# Patient Record
Sex: FEMALE | Race: WHITE | NOT HISPANIC OR LATINO | Employment: OTHER | URBAN - METROPOLITAN AREA
[De-identification: names, ages, dates, MRNs, and addresses within clinical notes are randomized per-mention and may not be internally consistent; named-entity substitution may affect disease eponyms.]

---

## 2017-01-12 ENCOUNTER — CLINICAL SUPPORT (OUTPATIENT)
Dept: CARDIOLOGY | Facility: CLINIC | Age: 82
End: 2017-01-12
Payer: MEDICARE

## 2017-01-12 DIAGNOSIS — Z98.61 HISTORY OF PTCA: ICD-10-CM

## 2017-01-12 DIAGNOSIS — I25.10 CAD IN NATIVE ARTERY: ICD-10-CM

## 2017-01-12 DIAGNOSIS — I10 ESSENTIAL HYPERTENSION: ICD-10-CM

## 2017-01-12 DIAGNOSIS — I20.9 AP (ANGINA PECTORIS): ICD-10-CM

## 2017-01-12 DIAGNOSIS — I77.9 CAROTID ARTERY DISEASE WITHOUT CEREBRAL INFARCTION: ICD-10-CM

## 2017-01-12 LAB — INTERNAL CAROTID STENOSIS: ABNORMAL

## 2017-01-12 PROCEDURE — 93880 EXTRACRANIAL BILAT STUDY: CPT | Mod: S$GLB,,, | Performed by: INTERNAL MEDICINE

## 2017-01-12 PROCEDURE — 93306 TTE W/DOPPLER COMPLETE: CPT | Mod: S$GLB,,, | Performed by: INTERNAL MEDICINE

## 2017-01-13 LAB
DIASTOLIC DYSFUNCTION: YES
ESTIMATED PA SYSTOLIC PRESSURE: 34.34
RETIRED EF AND QEF - SEE NOTES: 60 (ref 55–65)
TRICUSPID VALVE REGURGITATION: ABNORMAL

## 2017-01-16 ENCOUNTER — TELEPHONE (OUTPATIENT)
Dept: RADIOLOGY | Facility: HOSPITAL | Age: 82
End: 2017-01-16

## 2017-01-17 ENCOUNTER — OFFICE VISIT (OUTPATIENT)
Dept: CARDIOLOGY | Facility: CLINIC | Age: 82
End: 2017-01-17
Payer: MEDICARE

## 2017-01-17 ENCOUNTER — HOSPITAL ENCOUNTER (OUTPATIENT)
Dept: RADIOLOGY | Facility: HOSPITAL | Age: 82
Discharge: HOME | End: 2017-01-17
Attending: FAMILY MEDICINE
Payer: MEDICARE

## 2017-01-17 VITALS
SYSTOLIC BLOOD PRESSURE: 128 MMHG | HEART RATE: 76 BPM | BODY MASS INDEX: 37.26 KG/M2 | DIASTOLIC BLOOD PRESSURE: 84 MMHG | WEIGHT: 218.25 LBS | HEIGHT: 64 IN

## 2017-01-17 DIAGNOSIS — I20.9 AP (ANGINA PECTORIS): ICD-10-CM

## 2017-01-17 DIAGNOSIS — I51.89 DIASTOLIC DYSFUNCTION: ICD-10-CM

## 2017-01-17 DIAGNOSIS — I25.10 ATHEROSCLEROSIS OF NATIVE CORONARY ARTERY OF NATIVE HEART WITHOUT ANGINA PECTORIS: ICD-10-CM

## 2017-01-17 DIAGNOSIS — I25.10 CAD IN NATIVE ARTERY: Primary | ICD-10-CM

## 2017-01-17 DIAGNOSIS — I77.9 CAROTID ARTERY DISEASE WITHOUT CEREBRAL INFARCTION: ICD-10-CM

## 2017-01-17 DIAGNOSIS — Z98.61 HISTORY OF PTCA: ICD-10-CM

## 2017-01-17 DIAGNOSIS — R92.8 FOLLOW-UP EXAMINATION OF ABNORMAL MAMMOGRAM: ICD-10-CM

## 2017-01-17 DIAGNOSIS — I10 ESSENTIAL HYPERTENSION: ICD-10-CM

## 2017-01-17 DIAGNOSIS — E11.9 CONTROLLED TYPE 2 DIABETES MELLITUS WITHOUT COMPLICATION, WITHOUT LONG-TERM CURRENT USE OF INSULIN: ICD-10-CM

## 2017-01-17 DIAGNOSIS — E78.2 MIXED HYPERLIPIDEMIA: ICD-10-CM

## 2017-01-17 DIAGNOSIS — J44.9 CHRONIC OBSTRUCTIVE PULMONARY DISEASE, UNSPECIFIED COPD TYPE: ICD-10-CM

## 2017-01-17 PROCEDURE — 3074F SYST BP LT 130 MM HG: CPT | Mod: S$GLB,,, | Performed by: INTERNAL MEDICINE

## 2017-01-17 PROCEDURE — 1160F RVW MEDS BY RX/DR IN RCRD: CPT | Mod: S$GLB,,, | Performed by: INTERNAL MEDICINE

## 2017-01-17 PROCEDURE — 99214 OFFICE O/P EST MOD 30 MIN: CPT | Mod: S$GLB,,, | Performed by: INTERNAL MEDICINE

## 2017-01-17 PROCEDURE — 3079F DIAST BP 80-89 MM HG: CPT | Mod: S$GLB,,, | Performed by: INTERNAL MEDICINE

## 2017-01-17 PROCEDURE — 77065 DX MAMMO INCL CAD UNI: CPT | Mod: 26,LT,, | Performed by: RADIOLOGY

## 2017-01-17 PROCEDURE — 1157F ADVNC CARE PLAN IN RCRD: CPT | Mod: S$GLB,,, | Performed by: INTERNAL MEDICINE

## 2017-01-17 PROCEDURE — 1159F MED LIST DOCD IN RCRD: CPT | Mod: S$GLB,,, | Performed by: INTERNAL MEDICINE

## 2017-01-17 PROCEDURE — 77061 BREAST TOMOSYNTHESIS UNI: CPT | Mod: 26,LT,, | Performed by: RADIOLOGY

## 2017-01-17 PROCEDURE — 76642 ULTRASOUND BREAST LIMITED: CPT | Mod: 26,LT,, | Performed by: RADIOLOGY

## 2017-01-17 PROCEDURE — 99999 PR PBB SHADOW E&M-EST. PATIENT-LVL III: CPT | Mod: PBBFAC,,, | Performed by: INTERNAL MEDICINE

## 2017-01-17 RX ORDER — ISOSORBIDE MONONITRATE 60 MG/1
60 TABLET, EXTENDED RELEASE ORAL 2 TIMES DAILY
Qty: 180 TABLET | Refills: 3 | Status: SHIPPED | OUTPATIENT
Start: 2017-01-17 | End: 2018-03-14 | Stop reason: SDUPTHER

## 2017-01-17 NOTE — PROGRESS NOTES
Subjective:    Patient ID:  Maldonado Deleon is a 81 y.o. female who presents for evaluation of Coronary Artery Disease; Carotid Artery Disease; Hyperlipidemia; and Hypertension      HPI Mrs. Deleon returns for f/u of CAD.   Her current medical conditions include COPD, carotid artery disease, hypertension, hyperlipidemia, diabetes (former smoker) and CAD. Nonsmoker.  s/p [facility] 6/15 for anginal sxs and had PCI of calcified 90% mid-RCA stenosis with Diamondback atherectomy and LINDSAY x one. Her mid-LAD stenosis was overal stable in 50 - 60% range and medical mgt advised.  She is here for f/u.   She is dealing with hip pain issues, both sides, and sciatica.  Has seen multiple doctors.  CAD is stable.  She states she has taken one sl ntg since I last saw her.  She thinks it was stress related.  She has chronic COTTER.  This is stable.  No pnd/orthopnea.  Uses home O2 prn.  HTN well controlled on current medical tx, no associated sxs.  No tia/cva sxs.  Carotid us is stable with no progression of disease.  Echo shows normal LVEF, DD.  Lipids controlled on statin tx.    Patient Active Problem List   Diagnosis    Colon cancer    Anemia due to chronic blood loss    Iron deficiency anemia, unspecified    Diaphragmatic hernia without obstruction and without gangrene    Chronic obstructive pulmonary disease    Abnormal CXR (chest x-ray)    CAD in native artery    History of PTCA    Carotid artery disease without cerebral infarction    Hypertension    Mixed hyperlipidemia    Neck pain    Lung nodule - Hamartoma    Sinus congestion    Chronic pain syndrome    Intractable episodic headache    Left knee pain    Left leg swelling    Hematoma    Anxiety    AP (angina pectoris)    Atherosclerosis of native coronary artery without angina pectoris    Diabetes type 2, controlled    Controlled type 2 diabetes mellitus without complication, without long-term current use of insulin    Left hip pain    Diastolic dysfunction  "    Past Medical History   Diagnosis Date    Atypical chest pain 8/26/2013    CAD (coronary artery disease)     CAD in native artery 10/5/2015    Carotid artery disease without cerebral infarction 8/26/2013    Colon cancer      resection and chemo.    COPD (chronic obstructive pulmonary disease)     Depression     Diabetes mellitus type II     Diaphragmatic hernia without obstruction and without gangrene 8/13/2015    Emphysema of lung     Encounter for blood transfusion     Gallstone pancreatitis     History of PTCA 10/5/2015    Hypertension     Lymphocytic colitis     Meningioma     OA (osteoarthritis)     Obesity 8/26/2013       Review of Systems   Constitution: Negative.   HENT: Negative.    Eyes: Negative.    Cardiovascular: Positive for dyspnea on exertion.   Respiratory: Positive for shortness of breath.    Endocrine: Negative.    Hematologic/Lymphatic: Negative.    Skin: Negative.    Musculoskeletal: Positive for arthritis and joint pain.   Gastrointestinal: Negative.    Genitourinary: Negative.    Neurological: Negative.    Psychiatric/Behavioral: Negative.    Allergic/Immunologic: Negative.        Visit Vitals    /84 (BP Location: Left arm, Patient Position: Sitting)    Pulse 76    Ht 5' 4" (1.626 m)    Wt 99 kg (218 lb 4.1 oz)    BMI 37.46 kg/m2       Wt Readings from Last 3 Encounters:   01/17/17 99 kg (218 lb 4.1 oz)   12/08/16 99.8 kg (220 lb)   12/01/16 100.6 kg (221 lb 12.5 oz)     Temp Readings from Last 3 Encounters:   12/01/16 97 °F (36.1 °C) (Tympanic)   11/12/16 97.7 °F (36.5 °C) (Tympanic)   09/28/16 97.2 °F (36.2 °C) (Tympanic)     BP Readings from Last 3 Encounters:   01/17/17 128/84   12/08/16 124/80   12/01/16 124/80     Pulse Readings from Last 3 Encounters:   01/17/17 76   12/08/16 76   12/01/16 80          Objective:    Physical Exam   Constitutional: She is oriented to person, place, and time. Vital signs are normal. She appears well-developed and " well-nourished. She is active and cooperative. She does not have a sickly appearance. She does not appear ill. No distress.   HENT:   Head: Normocephalic.   Neck: Neck supple. Normal carotid pulses, no hepatojugular reflux and no JVD present. Carotid bruit is not present. No thyromegaly present.   Cardiovascular: Normal rate, regular rhythm, S1 normal, S2 normal and normal pulses.  PMI is not displaced.  Exam reveals no gallop and no friction rub.    Murmur heard.   Medium-pitched midsystolic murmur is present with a grade of 1/6  at the upper left sternal border  Pulses:       Radial pulses are 2+ on the right side, and 2+ on the left side.   Pulmonary/Chest: Effort normal and breath sounds normal. She has no wheezes. She has no rales.   Abdominal: Soft. Normal appearance, normal aorta and bowel sounds are normal. There is no tenderness.   Musculoskeletal: She exhibits no edema.   Lymphadenopathy:     She has no cervical adenopathy.   Neurological: She is alert and oriented to person, place, and time.   Skin: Skin is warm. She is not diaphoretic.   Psychiatric: She has a normal mood and affect. Her behavior is normal.   Nursing note and vitals reviewed.      I have reviewed all pertinent labs and cardiac studies.        Chemistry        Component Value Date/Time     07/19/2016 0836    K 4.0 07/19/2016 0836     07/19/2016 0836    CO2 31 (H) 07/19/2016 0836    BUN 16 07/19/2016 0836    CREATININE 0.8 07/19/2016 0836     07/19/2016 0836        Component Value Date/Time    CALCIUM 9.4 07/19/2016 0836    ALKPHOS 75 07/19/2016 0836    AST 17 07/19/2016 0836    ALT 14 07/19/2016 0836    BILITOT 0.3 07/19/2016 0836        Lab Results   Component Value Date    WBC 7.14 08/17/2016    HGB 13.3 08/17/2016    HCT 41.4 08/17/2016    MCV 91 08/17/2016     08/17/2016     Lab Results   Component Value Date    HGBA1C 5.8 12/01/2016     Lab Results   Component Value Date    CHOL 168 07/19/2016    CHOL 159  04/12/2016    CHOL 151 06/17/2014     Lab Results   Component Value Date    HDL 54 07/19/2016    HDL 58 04/12/2016    HDL 61 06/17/2014     Lab Results   Component Value Date    LDLCALC 92.0 07/19/2016    LDLCALC 75.8 04/12/2016    LDLCALC 69.4 06/17/2014     Lab Results   Component Value Date    TRIG 110 07/19/2016    TRIG 126 04/12/2016    TRIG 103 06/17/2014     Lab Results   Component Value Date    CHOLHDL 32.1 07/19/2016    CHOLHDL 36.5 04/12/2016    CHOLHDL 40.4 06/17/2014       TEST DESCRIPTION     RIGHT  The right Mid Common Carotid Artery is visualized.   The right carotid bulb artery is visualized.   The right Distal Internal Carotid Artery has 20 - 39% stenosis.   The right external carotid artery is visualized.   The right vertebral artery is visualized, associated with anterograde flow.   The right ICA/CCA ratio is: .93    LEFT  The left Mid Common Carotid Artery is visualized.   The left carotid bulb artery is visualized, associated with heterogeneous plaq.   The left Distal Internal Carotid Artery has 50 - 59% stenosis, associated with tortuosity.   There is acceleration in the left external carotid artery, associated with heterogeneous plaq.   The left vertebral artery is visualized, associated with anterograde flow.   The left ICA/CCA ratio is: 2.51      CONCLUSIONS   There is 20 - 39% right Internal Carotid stenosis.  There is 50 - 59% left Internal Carotid stenosis.          This document has been electronically    SIGNED BY: Theresa Reyes MD On: 01/12/2017 17:03          TEST DESCRIPTION       Aorta: The aortic root is normal in size, measuring 2.4 cm at sinotubular junction and 3.1 cm at Sinuses of Valsalva. The proximal ascending aorta is normal in size, measuring 2.4 cm across.     Left Atrium: The left atrial volume index is moderately enlarged, measuring 43.80 cc/m2.     Left Ventricle: The left ventricle is normal in size, with an end-diastolic diameter of 4.0 cm, and an end-systolic  diameter of 2.7 cm. LV wall thickness is normal, with the septum measuring 1.6 cm and the posterior wall measuring 1.4 cm across. Relative   wall thickness was increased at 0.70, and the LV mass index was increased at 135.6 g/m2 consistent with concentric left ventricular hypertrophy. Global left ventricular systolic function appears normal. Visually estimated ejection fraction is 60-65%.   The LV Doppler derived stroke volume equals 94.0 ccs.   The E/e'(lat) is 9.  This along with the following abnormalities (GOPI = 43.80 and LVMI = 135.60) suggests significant diastolic dysfunction.     Right Atrium: The right atrium is normal in size, measuring 5.4 cm in length and 3.4 cm in width in the apical view.     Right Ventricle: The right ventricle is normal in size measuring 3.5 cm at the base in the apical right ventricle-focused view. Global right ventricular systolic function appears normal. Tricuspid annular plane systolic excursion (TAPSE) is 2.5 cm. The   estimated PA systolic pressure is greater than 34 mmHg.     Aortic Valve:  The aortic valve is mildly sclerotic.     Mitral Valve:  Mitral valve is normal in structure with normal leaflet mobility.     Tricuspid Valve:  There is trivial to mild tricuspid regurgitation. Tricuspid valve is normal in structure with normal leaflet mobility.     Pulmonary Valve:  Pulmonary valve is normal in structure with normal leaflet mobility.     Intracavitary: There is no evidence of pericardial effusion, intracavity mass, thrombi, or vegetation.         CONCLUSIONS     1 - Moderate left atrial enlargement.     2 - Concentric hypertrophy.     3 - Normal left ventricular systolic function (EF 60-65%).     4 - Left ventricular diastolic dysfunction.     5 - Normal right ventricular systolic function .     6 - The estimated PA systolic pressure is greater than 34 mmHg.     7 - Trivial to mild tricuspid regurgitation.             This document has been electronically    SIGNED BY:  Hayes Chavez MD On: 01/13/2017 08:49        Assessment:       1. CAD in native artery    2. AP (angina pectoris)    3. History of PTCA    4. Carotid artery disease without cerebral infarction    5. Essential hypertension    6. Mixed hyperlipidemia    7. Atherosclerosis of native coronary artery of native heart without angina pectoris    8. Controlled type 2 diabetes mellitus without complication, without long-term current use of insulin    9. Chronic obstructive pulmonary disease, unspecified COPD type    10. Diastolic dysfunction         Plan:             STABLE CV CONDITIONS.  CHRONIC STABLE ANGINA/CAD.  CONTINUE OPTIMAL MEDICAL TX FOR CAD.  MAY CONTINUE TO USE SL NTG FOR ANGINA.   F/U CAROTID US ONE YEAR.  ALL TEST RESULTS REVIEWED IN DETAIL WITH PT.  CONTINUE F/U WITH PULMONARY FOR COPD.  F/U WITH ORTHO ON HIP DISCOMFORT/PAIN.  NO CHANGES TO MEDS TODAY.  F/U 6 MONTHS WITH LIPIDS/LABS.

## 2017-01-17 NOTE — MR AVS SNAPSHOT
[facility] - Cardiology  [address] [facility] [address]  [place] [address]  Phone: [phone]  Fax: [phone]                  Maldonado Deleon   2017 11:40 AM   Office Visit    Description:  Female : 1935   Provider:  Kurt Taylor MD   Department:  [facility] - Cardiology           Reason for Visit     Coronary Artery Disease     Carotid Artery Disease     Hyperlipidemia     Hypertension           Diagnoses this Visit        Comments    CAD in native artery    -  Primary     AP (angina pectoris)     will continue with the isosorbide    History of PTCA         Carotid artery disease without cerebral infarction         Essential hypertension         Mixed hyperlipidemia         Atherosclerosis of native coronary artery of native heart without angina pectoris         Controlled type 2 diabetes mellitus without complication, without long-term current use of insulin         Chronic obstructive pulmonary disease, unspecified COPD type         Diastolic dysfunction                To Do List           Future Appointments        Provider Department Dept Phone    2017 11:00 AM Christopher Gutierrez Sr., MD [facility] - Orthopedics [phone]    2017 11:15 AM [facility] [phone]    2017 11:40 AM Dale Patel MD [facility] Pulmonary Services [phone]      Goals (5 Years of Data)     None      Follow-Up and Disposition     Return in about 6 months (around 2017).       These Medications        Disp Refills Start End    isosorbide mononitrate (IMDUR) 60 MG 24 hr tablet 180 tablet 3 2017    Take 1 tablet (60 mg total) by mouth 2 (two) times daily. - Oral    Pharmacy: [facility] [address] [place], [place] [address], [place] - [address] [facility] Ph #: [phone]         [facility]     [facility] -  Assistance  Registered nurses in the [facility] provide clinical advisement, health education, appointment booking, and other  advisory services.  Call for this free service at [phone].             Medications           Message regarding Medications     Verify the changes and/or additions to your medication regime listed below are the same as discussed with your clinician today.  If any of these changes or additions are incorrect, please notify your healthcare provider.        STOP taking these medications     cetirizine (ZYRTEC) 10 MG tablet Take 1 tablet (10 mg total) by mouth once daily.    spironolactone (ALDACTONE) 25 MG tablet Take 1 tablet (25 mg total) by mouth once daily.           Verify that the below list of medications is an accurate representation of the medications you are currently taking.  If none reported, the list may be blank. If incorrect, please contact your healthcare provider. Carry this list with you in case of emergency.           Current Medications     albuterol-ipratropium 2.5mg-0.5mg/3mL (DUO-NEB) 0.5 mg-3 mg(2.5 mg base)/3 mL nebulizer solution Take 3 mLs by nebulization every 6 (six) hours.    amlodipine (NORVASC) 5 MG tablet TAKE 1 TABLET TWICE DAILY    aspirin (ECOTRIN) 81 MG EC tablet Take 81 mg by mouth once daily.    atenolol (TENORMIN) 25 MG tablet Take 1 tablet (25 mg total) by mouth once daily.    benazepril (LOTENSIN) 20 MG tablet Take 1 tablet (20 mg total) by mouth 2 (two) times daily.    budesonide (PULMICORT) 0.5 mg/2 mL nebulizer solution Take 2 mLs (0.5 mg total) by nebulization 2 (two) times daily. Wash out mouth after using.    clopidogrel (PLAVIX) 75 mg tablet Take 1 tablet (75 mg total) by mouth once daily.    fluticasone (FLONASE) 50 mcg/actuation nasal spray 2 sprays by Each Nare route once daily.    gabapentin (NEURONTIN) 300 MG capsule Take 1 capsule (300 mg total) by mouth 3 (three) times daily.    hydrocodone-acetaminophen 10-325mg (NORCO)  mg Tab 1 tablet in the morning and 1 at night, 1/2 tablet every 4 hours.    isosorbide mononitrate (IMDUR) 60 MG 24 hr tablet Take 1  "tablet (60 mg total) by mouth 2 (two) times daily.    metformin (GLUCOPHAGE) 500 MG tablet Take 1 tablet (500 mg total) by mouth 2 (two) times daily with meals.    nebulizer accessories Kit Use with nebulizer    nitroGLYCERIN (NITROSTAT) 0.4 MG SL tablet Place 1 tablet (0.4 mg total) under the tongue every 5 (five) minutes as needed for Chest pain.    pravastatin (PRAVACHOL) 20 MG tablet Take 1 tablet (20 mg total) by mouth once daily.    sertraline (ZOLOFT) 100 MG tablet Take 1 tablet (100 mg total) by mouth every evening.           Clinical Reference Information           Vital Signs - Last Recorded  Most recent update: 1/17/2017 11:28 AM by Ana Thompson    BP Pulse Ht Wt BMI    128/84 (BP Location: Left arm, Patient Position: Sitting) 76 5' 4" (1.626 m) 99 kg (218 lb 4.1 oz) 37.46 kg/m2      Blood Pressure          Most Recent Value    Right Arm BP - Sitting  144/80    Left Arm BP - Sitting  128/84    BP  128/84      Allergies as of 1/17/2017     No Known Allergies      Immunizations Administered on Date of Encounter - 1/17/2017     None      Orders Placed During Today's Visit     Future Labs/Procedures Expected by Expires    CBC auto differential  7/17/2017 3/18/2018    Comprehensive metabolic panel  7/17/2017 (Approximate) 3/18/2018    Lipid panel  7/17/2017 3/18/2018      "

## 2017-01-18 ENCOUNTER — DOCUMENTATION ONLY (OUTPATIENT)
Dept: RADIOLOGY | Facility: HOSPITAL | Age: 82
End: 2017-01-18

## 2017-01-18 NOTE — PROGRESS NOTES
Breast Care Management Follow-Up:    Date of Mammogram:01/17/17    Mammogram Reason:Follow-up at short-interval from prior study    Mammogram Results:and Left U/S - density seen in the left breast on prior study does not persist.      Referrals/Recommendations:Annual mammo in July 2017.        Patient Status:01/18/17 Results letter and report mailed to pt.

## 2017-01-19 NOTE — TELEPHONE ENCOUNTER
----- Message from Kenrick Paula sent at 1/19/2017 12:10 PM CST -----  Pt is requesting a call from nurse to discuss a prescription renewal for pain.          Please call pt back at [phone]

## 2017-01-20 NOTE — TELEPHONE ENCOUNTER
----- Message from Justine Wu sent at 1/20/2017  1:27 PM CST -----  Contact: Patient  Patient is returning a call to Nayeli, please call her back at [phone]. Thank you

## 2017-01-21 RX ORDER — HYDROCODONE BITARTRATE AND ACETAMINOPHEN 10; 325 MG/1; MG/1
TABLET ORAL
Qty: 90 TABLET | Refills: 0 | Status: SHIPPED | OUTPATIENT
Start: 2017-01-21 | End: 2017-01-24

## 2017-01-23 DIAGNOSIS — M25.552 LEFT HIP PAIN: Primary | ICD-10-CM

## 2017-01-24 ENCOUNTER — OFFICE VISIT (OUTPATIENT)
Dept: ORTHOPEDICS | Facility: CLINIC | Age: 82
End: 2017-01-24
Payer: MEDICARE

## 2017-01-24 ENCOUNTER — HOSPITAL ENCOUNTER (OUTPATIENT)
Dept: RADIOLOGY | Facility: HOSPITAL | Age: 82
Discharge: HOME | End: 2017-01-24
Attending: ORTHOPAEDIC SURGERY
Payer: MEDICARE

## 2017-01-24 VITALS
WEIGHT: 219.56 LBS | BODY MASS INDEX: 37.48 KG/M2 | SYSTOLIC BLOOD PRESSURE: 164 MMHG | DIASTOLIC BLOOD PRESSURE: 80 MMHG | HEART RATE: 72 BPM | HEIGHT: 64 IN

## 2017-01-24 DIAGNOSIS — M25.552 LEFT HIP PAIN: ICD-10-CM

## 2017-01-24 DIAGNOSIS — M16.12 ARTHRITIS OF LEFT HIP: Primary | ICD-10-CM

## 2017-01-24 PROCEDURE — 3077F SYST BP >= 140 MM HG: CPT | Mod: S$GLB,,, | Performed by: ORTHOPAEDIC SURGERY

## 2017-01-24 PROCEDURE — 99204 OFFICE O/P NEW MOD 45 MIN: CPT | Mod: S$GLB,,, | Performed by: ORTHOPAEDIC SURGERY

## 2017-01-24 PROCEDURE — 1157F ADVNC CARE PLAN IN RCRD: CPT | Mod: S$GLB,,, | Performed by: ORTHOPAEDIC SURGERY

## 2017-01-24 PROCEDURE — 1125F AMNT PAIN NOTED PAIN PRSNT: CPT | Mod: S$GLB,,, | Performed by: ORTHOPAEDIC SURGERY

## 2017-01-24 PROCEDURE — 73502 X-RAY EXAM HIP UNI 2-3 VIEWS: CPT | Mod: 26,LT,, | Performed by: RADIOLOGY

## 2017-01-24 PROCEDURE — 3079F DIAST BP 80-89 MM HG: CPT | Mod: S$GLB,,, | Performed by: ORTHOPAEDIC SURGERY

## 2017-01-24 PROCEDURE — 1159F MED LIST DOCD IN RCRD: CPT | Mod: S$GLB,,, | Performed by: ORTHOPAEDIC SURGERY

## 2017-01-24 PROCEDURE — 99999 PR PBB SHADOW E&M-EST. PATIENT-LVL III: CPT | Mod: PBBFAC,,, | Performed by: ORTHOPAEDIC SURGERY

## 2017-01-24 PROCEDURE — 1160F RVW MEDS BY RX/DR IN RCRD: CPT | Mod: S$GLB,,, | Performed by: ORTHOPAEDIC SURGERY

## 2017-01-24 RX ORDER — OXYCODONE AND ACETAMINOPHEN 10; 325 MG/1; MG/1
1 TABLET ORAL EVERY 4 HOURS PRN
Qty: 90 TABLET | Refills: 0 | Status: SHIPPED | OUTPATIENT
Start: 2017-01-24 | End: 2017-03-06 | Stop reason: SDUPTHER

## 2017-01-24 NOTE — PATIENT INSTRUCTIONS
What is Arthritis?  Arthritis is a disease that affects the joints (the parts where bones meet and move). It can affect any joint in your body. There are many types of arthritis, including osteoarthritis and rheumatoid arthrtitis. If your symptoms are mild, medications may be enough to reduce pain and swelling. For more severe arthritis, surgery may be needed to improve the condition of the joint or replace the joint entirely.                  What causes arthritis?  Cartilage is a smooth substance that protects the ends of your bones and provides cushioning. When you have arthritis, this cartilage breaks down and can no longer protect your bones. The bones rub against each other, causing pain and swelling. Over time, bone spurs (small pieces of rough or splintered bone) may develop, and the joint's range of motion can become limited.  Symptoms  Some of the more common symptoms of arthritis include:  · Joint pain and stiffness. Pain and stiffness get worse with long periods of rest or using a joint too long or too hard.  · Joints that have lost normal shape and motion.  · Tender, inflamed joints. They may look red and feel warm.  · Grinding or popping noise with joint movement.   · Feeling tired all the time.  Reducing symptoms  Following a healthy lifestyle by losing weight and exercising can help reduce symptoms of osteoarthritis. Medicines can be very helpful for arthritis.     © 1866-5198 [employer]. [address] [place] [address], [place] [address]. All rights reserved. This information is not intended as a substitute for professional medical care. Always follow your healthcare professional's instructions.        What is Arthritis?  Arthritis is a disease that affects the joints (the parts where bones meet and move). It can affect any joint in your body. There are many types of arthritis, including osteoarthritis and rheumatoid arthrtitis. If your symptoms are mild, medications may be enough to reduce  pain and swelling. For more severe arthritis, surgery may be needed to improve the condition of the joint or replace the joint entirely.                  What causes arthritis?  Cartilage is a smooth substance that protects the ends of your bones and provides cushioning. When you have arthritis, this cartilage breaks down and can no longer protect your bones. The bones rub against each other, causing pain and swelling. Over time, bone spurs (small pieces of rough or splintered bone) may develop, and the joint's range of motion can become limited.  Symptoms  Some of the more common symptoms of arthritis include:  · Joint pain and stiffness. Pain and stiffness get worse with long periods of rest or using a joint too long or too hard.  · Joints that have lost normal shape and motion.  · Tender, inflamed joints. They may look red and feel warm.  · Grinding or popping noise with joint movement.   · Feeling tired all the time.  Reducing symptoms  Following a healthy lifestyle by losing weight and exercising can help reduce symptoms of osteoarthritis. Medicines can be very helpful for arthritis.     © 1056-3298 [employer]. [address] [place] [address], [place] [address]. All rights reserved. This information is not intended as a substitute for professional medical care. Always follow your healthcare professional's instructions.

## 2017-01-24 NOTE — PROGRESS NOTES
CC:This is a 81-year-old female that complains of severe Left hip pain.    HPI:The patient has had a fracture of the left hip in the past.  She underwent an ORIF of that hip.  The patient states that the pain is rated a 10 out of 10.    PMH:    Past Medical History   Diagnosis Date    Atypical chest pain 8/26/2013    CAD (coronary artery disease)     CAD in native artery 10/5/2015    Carotid artery disease without cerebral infarction 8/26/2013    Colon cancer      resection and chemo.    COPD (chronic obstructive pulmonary disease)     Depression     Diabetes mellitus type II     Diaphragmatic hernia without obstruction and without gangrene 8/13/2015    Emphysema of lung     Encounter for blood transfusion     Gallstone pancreatitis     History of PTCA 10/5/2015    Hypertension     Lymphocytic colitis     Meningioma     OA (osteoarthritis)     Obesity 8/26/2013       PSH:    Past Surgical History   Procedure Laterality Date    Shoulder surgery      Back surgery      Hip surgery       x 3 in one year.    Parathyroidectomy      Colectomy      Cholecystectomy      Cataract extraction      Abdominal surgery      Eye surgery      Bilateral knee surgery      4 hip surgeries      Coronary angioplasty         Family Hx:    Family History   Problem Relation Age of Onset    Hypertension Mother     Diabetes Mother     Heart failure Mother     Diabetes Maternal Aunt     Hypertension Maternal Aunt     Heart failure Maternal Aunt     Diabetes Maternal Aunt     Hypertension Maternal Aunt     Heart failure Maternal Aunt     Diabetes Maternal Aunt     Hypertension Maternal Aunt     Heart failure Maternal Aunt     Diabetes Maternal Aunt     Hypertension Maternal Aunt     Heart failure Maternal Aunt     Heart disease Father        Allergy:  Review of patient's allergies indicates:  No Known Allergies    Medication:    Current Outpatient Prescriptions:     albuterol-ipratropium  2.5mg-0.5mg/3mL (DUO-NEB) 0.5 mg-3 mg(2.5 mg base)/3 mL nebulizer solution, Take 3 mLs by nebulization every 6 (six) hours., Disp: 120 vial, Rfl: 12    amlodipine (NORVASC) 5 MG tablet, TAKE 1 TABLET TWICE DAILY, Disp: 180 tablet, Rfl: 3    aspirin (ECOTRIN) 81 MG EC tablet, Take 81 mg by mouth once daily., Disp: , Rfl:     atenolol (TENORMIN) 25 MG tablet, Take 1 tablet (25 mg total) by mouth once daily., Disp: 90 tablet, Rfl: 3    benazepril (LOTENSIN) 20 MG tablet, Take 1 tablet (20 mg total) by mouth 2 (two) times daily., Disp: 180 tablet, Rfl: 3    budesonide (PULMICORT) 0.5 mg/2 mL nebulizer solution, Take 2 mLs (0.5 mg total) by nebulization 2 (two) times daily. Wash out mouth after using., Disp: 120 mL, Rfl: 12    clopidogrel (PLAVIX) 75 mg tablet, Take 1 tablet (75 mg total) by mouth once daily., Disp: 90 tablet, Rfl: 3    fluticasone (FLONASE) 50 mcg/actuation nasal spray, 2 sprays by Each Nare route once daily., Disp: 1 Bottle, Rfl: 11    gabapentin (NEURONTIN) 300 MG capsule, Take 1 capsule (300 mg total) by mouth 3 (three) times daily., Disp: 270 capsule, Rfl: 3    hydrocodone-acetaminophen 10-325mg (NORCO)  mg Tab, 1 tablet in the morning and 1 at night, 1/2 tablet every 4 hours., Disp: 90 tablet, Rfl: 0    isosorbide mononitrate (IMDUR) 60 MG 24 hr tablet, Take 1 tablet (60 mg total) by mouth 2 (two) times daily., Disp: 180 tablet, Rfl: 3    metformin (GLUCOPHAGE) 500 MG tablet, Take 1 tablet (500 mg total) by mouth 2 (two) times daily with meals., Disp: 180 tablet, Rfl: 3    nebulizer accessories Kit, Use with nebulizer, Disp: 1 kit, Rfl: prn    nitroGLYCERIN (NITROSTAT) 0.4 MG SL tablet, Place 1 tablet (0.4 mg total) under the tongue every 5 (five) minutes as needed for Chest pain., Disp: 60 tablet, Rfl: 12    pravastatin (PRAVACHOL) 20 MG tablet, Take 1 tablet (20 mg total) by mouth once daily., Disp: 90 tablet, Rfl: 4    sertraline (ZOLOFT) 100 MG tablet, Take 1 tablet (100 mg  "total) by mouth every evening., Disp: 90 tablet, Rfl: 3    Social History:    Social History     Social History    Marital status:      Spouse name: N/A    Number of children: N/A    Years of education: N/A     Occupational History    Not on file.     Social History Main Topics    Smoking status: Former Smoker     Packs/day: 2.50     Years: 50.00     Types: Cigarettes     Quit date: 8/7/1987    Smokeless tobacco: Never Used    Alcohol use No    Drug use: No    Sexual activity: No     Other Topics Concern    Not on file     Social History Narrative       Vitals:     Visit Vitals    BP (!) 164/80    Pulse 72    Ht 5' 4" (1.626 m)    Wt 99.6 kg (219 lb 9.3 oz)    BMI 37.69 kg/m2        ROS:  GENERAL: No fever, chills, fatigability or weight loss.  SKIN: No rashes, itching or changes in color or texture of skin.  HEAD: No headaches or recent head trauma.  EYES: Visual acuity fine. No photophobia, ocular pain or diplopia.  EARS: Denies ear pain, discharge or vertigo.  NOSE: No loss of smell, no epistaxis or postnasal drip.  MOUTH & THROAT: No hoarseness or change in voice. No excessive gum bleeding.  NODES: Denies swollen glands.  CHEST: Denies COTTER, cyanosis, wheezing, cough and sputum production.  CARDIOVASCULAR: Denies chest pain, PND, orthopnea or reduced exercise tolerance.  ABDOMEN: Appetite fine. No weight loss. Denies diarrhea, abdominal pain, hematemesis or blood in stool.  URINARY: No flank pain, dysuria or hematuria.  PERIPHERAL VASCULAR: No claudication or cyanosis.  NEUROLOGIC: No history of seizures, paralysis, alteration of gait or coordination.  MUSCULOSKELETAL: See HPI    PE:  APPEARANCE: Well nourished, well developed, in no acute distress.   HEAD: Normocephalic, atraumatic.  EYES: PERRL. EOMI.   EARS: TM's intact. Light reflex normal. No retraction or perforation.   NOSE: Mucosa pink. Airway clear.  MOUTH & THROAT: No tonsillar enlargement. No pharyngeal erythema or exudate. No " stridor.  NECK: Supple.   NODES: No cervical, axillary or inguinal lymph node enlargement.  CHEST: Lungs clear to auscultation.  CARDIOVASCULAR: Normal S1, S2. No rubs, murmurs or gallops.  ABDOMEN: Bowel sounds normal. Not distended. Soft. No tenderness or masses.  NEUROLOGIC: Cranial Nerves: II-XII grossly intact, also see MUSCULOSKELETAL  MUSCULOSKELETAL:     Bilateral Hips-  Severely limited range of motion,      Hip                           ( Left)  Degrees     Normal  Flexion                Decreased                                       0-135  Extension              Decreased                                        0-30  Abduction               Decreased                                    0-50  Adduction              Decreased                                     0-30  Medial Rotation       Decrease                                   0-40                         Lateral Rotation     Decrease                                 0-60  Crepitus   Greater Trochanteric tenderness  +1-2/4 DP and PT artery pulses         Sensation intact all dermatomes  +4-5/5 motor strength throughout  +2/4 DTR-PT,AT  Negative long tract signs- neg Babinski, neg Clonus        Assessment:           Diagnosis:              1.left hip arthritis With hardware failure                    Diagnostic Studies  MRI-No  X-Ray-No  EMG/NCV-No  Arthrogram-No  Bone Scan-No  CT Scan-No  Doppler-No  ESR-No  CRP-No  CBC with Diff-No   Rheumatoid/Arthritis Panel-No      Plan:                                                 1. PT-yes                                                 2.OT-no                                          3.NSAID-yes                                        4. Narcotics-yes                                     5. Wound care-No                                 6. Rest-yes                                           7. Surgery-yes , Left hip hardware removal and anterior hip replacement.                                        8. CHANNING Hose-yes                                     9. Anticoagulation therapy-no               10. Elevation-no                                     11. Crutches-no                                    12. Walker-no             13. Cane no                        14. Referral-no                                     15.Injection-no                            16. Splint   /    Cast   /   Cast Shoe-No              17. RICE-none            18. Follow up-  weeks

## 2017-01-25 DIAGNOSIS — M16.12 ARTHRITIS OF LEFT HIP: ICD-10-CM

## 2017-01-25 DIAGNOSIS — Z01.818 PRE-OP TESTING: Primary | ICD-10-CM

## 2017-01-30 ENCOUNTER — TELEPHONE (OUTPATIENT)
Dept: ORTHOPEDICS | Facility: CLINIC | Age: 82
End: 2017-01-30

## 2017-01-30 NOTE — TELEPHONE ENCOUNTER
----- Message from Melanie Terrell sent at 1/30/2017 12:24 PM CST -----  Contact: pt  Pt calling regarding appt she has for surgery.

## 2017-02-08 ENCOUNTER — TELEPHONE (OUTPATIENT)
Dept: ORTHOPEDICS | Facility: CLINIC | Age: 82
End: 2017-02-08

## 2017-02-08 NOTE — TELEPHONE ENCOUNTER
----- Message from Melanie White sent at 2/8/2017  3:26 PM CST -----  Please call pt back at [phone] in regards to her surgery. Pt would like to know if she need to get any records.

## 2017-02-08 NOTE — TELEPHONE ENCOUNTER
Returned pt call. pt wanted to know if Dr. Gutierrez needed any of her old records and if she could choose her anesthesiologist for her surgery. Informed pt that we have her x-rays that were taken and she will see her other physicians for clearance before surgery. Informed pt that when she has her nurse visit on 3/15 she can ask about choosing her anesthesiologist. Pt vocalized understanding.

## 2017-02-19 DIAGNOSIS — I25.10 ATHEROSCLEROSIS OF NATIVE CORONARY ARTERY WITHOUT ANGINA PECTORIS, UNSPECIFIED WHETHER NATIVE OR TRANSPLANTED HEART: ICD-10-CM

## 2017-02-20 RX ORDER — CLOPIDOGREL BISULFATE 75 MG/1
TABLET ORAL
Qty: 90 TABLET | Refills: 2 | Status: SHIPPED | OUTPATIENT
Start: 2017-02-20 | End: 2018-03-06 | Stop reason: SDUPTHER

## 2017-03-06 DIAGNOSIS — M25.559 ARTHRALGIA OF HIP, UNSPECIFIED LATERALITY: Primary | ICD-10-CM

## 2017-03-06 RX ORDER — OXYCODONE AND ACETAMINOPHEN 10; 325 MG/1; MG/1
1 TABLET ORAL EVERY 8 HOURS PRN
Qty: 90 TABLET | Refills: 0 | Status: ON HOLD | OUTPATIENT
Start: 2017-03-06 | End: 2017-03-29

## 2017-03-06 NOTE — TELEPHONE ENCOUNTER
----- Message from Maida Cardona sent at 3/6/2017  2:00 PM CST -----  Would like to speak to nurse. Please call back at [phone]. Thanks//cdb

## 2017-03-08 ENCOUNTER — TELEPHONE (OUTPATIENT)
Dept: ORTHOPEDICS | Facility: CLINIC | Age: 82
End: 2017-03-08

## 2017-03-08 NOTE — TELEPHONE ENCOUNTER
----- Message from Geovany Reddy PA-C sent at 3/6/2017  5:05 PM CST -----  RX sent changed to 1 tab every 8 hours.

## 2017-03-09 ENCOUNTER — TELEPHONE (OUTPATIENT)
Dept: PULMONOLOGY | Facility: CLINIC | Age: 82
End: 2017-03-09

## 2017-03-09 DIAGNOSIS — J44.9 CHRONIC OBSTRUCTIVE PULMONARY DISEASE, UNSPECIFIED COPD TYPE: Primary | ICD-10-CM

## 2017-03-09 DIAGNOSIS — R93.89 ABNORMAL CHEST X-RAY: ICD-10-CM

## 2017-03-09 DIAGNOSIS — Z01.811 PRE-OPERATIVE RESPIRATORY EXAMINATION: ICD-10-CM

## 2017-03-09 DIAGNOSIS — Z01.818 PRE-OP TESTING: ICD-10-CM

## 2017-03-15 ENCOUNTER — HOSPITAL ENCOUNTER (OUTPATIENT)
Dept: RADIOLOGY | Facility: HOSPITAL | Age: 82
Discharge: HOME | End: 2017-03-15
Attending: ORTHOPAEDIC SURGERY
Payer: MEDICARE

## 2017-03-15 ENCOUNTER — CLINICAL SUPPORT (OUTPATIENT)
Dept: CARDIOLOGY | Facility: CLINIC | Age: 82
End: 2017-03-15
Payer: MEDICARE

## 2017-03-15 DIAGNOSIS — Z01.818 PRE-OP TESTING: Primary | ICD-10-CM

## 2017-03-15 DIAGNOSIS — Z98.890 POST-OPERATIVE STATE: ICD-10-CM

## 2017-03-15 DIAGNOSIS — Z01.818 PRE-OP TESTING: ICD-10-CM

## 2017-03-15 PROCEDURE — 93000 ELECTROCARDIOGRAM COMPLETE: CPT | Mod: S$GLB,,, | Performed by: NUCLEAR MEDICINE

## 2017-03-15 PROCEDURE — 71020 XR CHEST PA AND LATERAL PRE-OP: CPT | Mod: 26,,, | Performed by: RADIOLOGY

## 2017-03-17 ENCOUNTER — OFFICE VISIT (OUTPATIENT)
Dept: INTERNAL MEDICINE | Facility: CLINIC | Age: 82
End: 2017-03-17
Payer: MEDICARE

## 2017-03-17 VITALS
DIASTOLIC BLOOD PRESSURE: 64 MMHG | HEART RATE: 72 BPM | HEIGHT: 64 IN | TEMPERATURE: 97 F | OXYGEN SATURATION: 95 % | SYSTOLIC BLOOD PRESSURE: 116 MMHG | BODY MASS INDEX: 37.52 KG/M2 | WEIGHT: 219.81 LBS

## 2017-03-17 DIAGNOSIS — J44.9 CHRONIC OBSTRUCTIVE PULMONARY DISEASE, UNSPECIFIED COPD TYPE: ICD-10-CM

## 2017-03-17 DIAGNOSIS — E11.9 CONTROLLED TYPE 2 DIABETES MELLITUS WITHOUT COMPLICATION, WITHOUT LONG-TERM CURRENT USE OF INSULIN: ICD-10-CM

## 2017-03-17 DIAGNOSIS — I25.10 CAD IN NATIVE ARTERY: ICD-10-CM

## 2017-03-17 DIAGNOSIS — C18.9 MALIGNANT NEOPLASM OF COLON, UNSPECIFIED PART OF COLON: ICD-10-CM

## 2017-03-17 DIAGNOSIS — M25.552 LEFT HIP PAIN: ICD-10-CM

## 2017-03-17 DIAGNOSIS — Z01.818 PREOP EXAM FOR INTERNAL MEDICINE: Primary | ICD-10-CM

## 2017-03-17 PROCEDURE — 3078F DIAST BP <80 MM HG: CPT | Mod: S$GLB,,, | Performed by: FAMILY MEDICINE

## 2017-03-17 PROCEDURE — 99499 UNLISTED E&M SERVICE: CPT | Mod: S$GLB,,, | Performed by: FAMILY MEDICINE

## 2017-03-17 PROCEDURE — 1125F AMNT PAIN NOTED PAIN PRSNT: CPT | Mod: S$GLB,,, | Performed by: FAMILY MEDICINE

## 2017-03-17 PROCEDURE — 99999 PR PBB SHADOW E&M-EST. PATIENT-LVL III: CPT | Mod: PBBFAC,,, | Performed by: FAMILY MEDICINE

## 2017-03-17 PROCEDURE — 99214 OFFICE O/P EST MOD 30 MIN: CPT | Mod: S$GLB,,, | Performed by: FAMILY MEDICINE

## 2017-03-17 PROCEDURE — 3074F SYST BP LT 130 MM HG: CPT | Mod: S$GLB,,, | Performed by: FAMILY MEDICINE

## 2017-03-17 PROCEDURE — 1159F MED LIST DOCD IN RCRD: CPT | Mod: S$GLB,,, | Performed by: FAMILY MEDICINE

## 2017-03-17 PROCEDURE — 1157F ADVNC CARE PLAN IN RCRD: CPT | Mod: S$GLB,,, | Performed by: FAMILY MEDICINE

## 2017-03-17 PROCEDURE — 1160F RVW MEDS BY RX/DR IN RCRD: CPT | Mod: S$GLB,,, | Performed by: FAMILY MEDICINE

## 2017-03-17 NOTE — PROGRESS NOTES
Subjective:       Patient ID: Maldonado Deleon is a 81 y.o. female.    Chief Complaint: Pre-op Exam (hip surgery)    HPI Comments: Preop Exam:       Pt is a 81 year old here for preop. Pt will have left hip replacement on 29th of march. Pt has had general anesthesia in the past. She has done well.    Review of Systems   Constitutional: Negative.  Negative for activity change, chills, fatigue and fever.   HENT: Negative.  Negative for congestion, ear pain, postnasal drip, rhinorrhea, sinus pressure, sore throat and trouble swallowing.    Eyes: Negative for visual disturbance.   Respiratory: Negative.  Negative for cough, chest tightness, shortness of breath and wheezing.    Gastrointestinal: Negative.    Endocrine: Negative.  Negative for cold intolerance and heat intolerance.   Genitourinary: Negative.  Negative for dysuria, hematuria, urgency, vaginal bleeding and vaginal discharge.   Musculoskeletal: Negative for arthralgias, back pain, joint swelling and neck stiffness.   Skin: Negative for color change and rash.   Neurological: Negative.  Negative for dizziness, tremors, seizures, syncope, weakness, light-headedness and headaches.   Hematological: Negative.    Psychiatric/Behavioral: Negative.  Negative for agitation, confusion, sleep disturbance and suicidal ideas. The patient is not nervous/anxious.        Objective:      Physical Exam   Constitutional: She is oriented to person, place, and time. She appears well-developed and well-nourished.   HENT:   Head: Normocephalic.   Eyes: EOM are normal. Pupils are equal, round, and reactive to light.   Neck: Normal range of motion. No thyromegaly present.   Cardiovascular: Normal rate and regular rhythm.    No murmur heard.  Pulmonary/Chest: Effort normal and breath sounds normal.   Abdominal: Soft. Bowel sounds are normal.   Musculoskeletal: Normal range of motion.   Neurological: She is alert and oriented to person, place, and time. She has normal reflexes.   Skin:  Skin is warm.   Psychiatric: She has a normal mood and affect. Her behavior is normal.   Vitals reviewed.      Assessment:       1. Preop exam for internal medicine    2. Controlled type 2 diabetes mellitus without complication, without long-term current use of insulin    3. CAD in native artery    4. Left hip pain    5. Chronic obstructive pulmonary disease, unspecified COPD type    6. Malignant neoplasm of colon, unspecified part of colon        Plan:       Preop exam for internal medicine  Comments:  Pt does have moderate cardiopulmonary risk but is stable she will see cardiology and pulmonary but is cleared from a General medical point    Controlled type 2 diabetes mellitus without complication, without long-term current use of insulin  Comments:  Pt DM is stable    CAD in native artery  Comments:  Pt will see Cardiology clearance but no CP or SOB    Left hip pain  Comments:  Pt is cleared medically pending pulmonary and cardiovascular assessment.    Chronic obstructive pulmonary disease, unspecified COPD type  Comments:  Pt will see Pulmonary before surgery.     Malignant neoplasm of colon, unspecified part of colon  Comments:  Stable at this point

## 2017-03-17 NOTE — MR AVS SNAPSHOT
[facility] - Internal Medicine  [address] [address]  Phone: [phone]  Fax: [phone]                  Maldonado Deleon   3/17/2017 9:40 AM   Office Visit    Description:  Female : 1935   Provider:  Bryson Nogueira MD   Department:  [facility]           Reason for Visit     Pre-op Exam           Diagnoses this Visit        Comments    Preop exam for internal medicine    -  Primary Pt does have moderate cardiopulmonary risk but is stable she will see cardiology and pulmonary but is cleared from a General medical point    Controlled type 2 diabetes mellitus without complication, without long-term current use of insulin     Pt DM is stable    CAD in native artery     Pt will see Cardiology clearance but no CP or SOB    Left hip pain     Pt is cleared medically pending pulmonary and cardiovascular assessment.    Chronic obstructive pulmonary disease, unspecified COPD type     Pt will see Pulmonary before surgery.     Malignant neoplasm of colon, unspecified part of colon     Stable at this point           To Do List           Future Appointments        Provider Department Dept Phone    3/20/2017 9:50 AM SPIROMETRY, [facility]- Pulmonary Function [facility] [phone]    3/20/2017 10:00 AM SPIROMETRY, [facility]- Pulmonary Function [facility] [phone]    3/20/2017 10:20 AM Dale Patel MD [facility] - Pulmonary Services [phone]    3/27/2017 11:40 AM Kurt Taylor MD [facility] - Cardiology [phone]    2017 10:30 AM Christopher Gutierrez Sr., MD [facility] - Orthopedics [phone]      Your Future Surgeries/Procedures     Mar 29, 2017   Surgery with Christopher Gutierrez Sr., MD   [facility])    [address] [address] [facility] [address] [phone]              Goals (5 Years of Data)     None      [facility]     [facility] -  Assistance  Registered nurses in the [facility] provide clinical  advisement, health education, appointment booking, and other advisory services.  Call for this free service at [phone].             Medications           Message regarding Medications     Verify the changes and/or additions to your medication regime listed below are the same as discussed with your clinician today.  If any of these changes or additions are incorrect, please notify your healthcare provider.             Verify that the below list of medications is an accurate representation of the medications you are currently taking.  If none reported, the list may be blank. If incorrect, please contact your healthcare provider. Carry this list with you in case of emergency.           Current Medications     albuterol-ipratropium 2.5mg-0.5mg/3mL (DUO-NEB) 0.5 mg-3 mg(2.5 mg base)/3 mL nebulizer solution Take 3 mLs by nebulization every 6 (six) hours.    amlodipine (NORVASC) 5 MG tablet TAKE 1 TABLET TWICE DAILY    aspirin (ECOTRIN) 81 MG EC tablet Take 81 mg by mouth once daily.    atenolol (TENORMIN) 25 MG tablet Take 1 tablet (25 mg total) by mouth once daily.    benazepril (LOTENSIN) 20 MG tablet Take 1 tablet (20 mg total) by mouth 2 (two) times daily.    budesonide (PULMICORT) 0.5 mg/2 mL nebulizer solution Take 2 mLs (0.5 mg total) by nebulization 2 (two) times daily. Wash out mouth after using.    clopidogrel (PLAVIX) 75 mg tablet TAKE ONE TABLET BY MOUTH ONCE DAILY    fluticasone (FLONASE) 50 mcg/actuation nasal spray 2 sprays by Each Nare route once daily.    gabapentin (NEURONTIN) 300 MG capsule Take 1 capsule (300 mg total) by mouth 3 (three) times daily.    isosorbide mononitrate (IMDUR) 60 MG 24 hr tablet Take 1 tablet (60 mg total) by mouth 2 (two) times daily.    metformin (GLUCOPHAGE) 500 MG tablet Take 1 tablet (500 mg total) by mouth 2 (two) times daily with meals.    nebulizer accessories Kit Use with nebulizer    nitroGLYCERIN (NITROSTAT) 0.4 MG SL tablet Place 1 tablet (0.4 mg total) under  "the tongue every 5 (five) minutes as needed for Chest pain.    oxycodone-acetaminophen (PERCOCET)  mg per tablet Take 1 tablet by mouth every 8 (eight) hours as needed.    pravastatin (PRAVACHOL) 20 MG tablet Take 1 tablet (20 mg total) by mouth once daily.    sertraline (ZOLOFT) 100 MG tablet Take 1 tablet (100 mg total) by mouth every evening.           Clinical Reference Information           Your Vitals Were     BP Pulse Temp Height Weight SpO2    116/64 (BP Location: Right arm, Patient Position: Sitting) 72 97.1 °F (36.2 °C) (Tympanic) 5' 4" (1.626 m) 99.7 kg (219 lb 12.8 oz) 95%    BMI                37.73 kg/m2          Blood Pressure          Most Recent Value    BP  116/64      Allergies as of 3/17/2017     No Known Allergies      Immunizations Administered on Date of Encounter - 3/17/2017     None      Language Assistance Services     ATTENTION: Language assistance services are available, free of charge. Please call [phone].      ATENCIÓN: Si erinla alfred, tiene a rinaldi disposición servicios gratuitos de asistencia lingüística. Llame al [phone].     ROSEMARIE Ý: N?u b?n nói Ti?ng Vi?t, có các d?ch v? h? tr? ngôn ng? mi?n phí dành cho b?n. G?i s? [phone].         [facility] complies with applicable Federal civil rights laws and does not discriminate on the basis of race, color, national origin, age, disability, or sex.        "

## 2017-03-20 ENCOUNTER — OFFICE VISIT (OUTPATIENT)
Dept: PULMONOLOGY | Facility: CLINIC | Age: 82
End: 2017-03-20
Payer: MEDICARE

## 2017-03-20 ENCOUNTER — PROCEDURE VISIT (OUTPATIENT)
Dept: PULMONOLOGY | Facility: CLINIC | Age: 82
End: 2017-03-20
Payer: MEDICARE

## 2017-03-20 VITALS
OXYGEN SATURATION: 93 % | WEIGHT: 220 LBS | SYSTOLIC BLOOD PRESSURE: 130 MMHG | HEIGHT: 64 IN | BODY MASS INDEX: 37.56 KG/M2 | HEART RATE: 72 BPM | DIASTOLIC BLOOD PRESSURE: 82 MMHG

## 2017-03-20 DIAGNOSIS — R91.1 LUNG NODULE: ICD-10-CM

## 2017-03-20 DIAGNOSIS — J44.9 CHRONIC OBSTRUCTIVE PULMONARY DISEASE, UNSPECIFIED COPD TYPE: ICD-10-CM

## 2017-03-20 DIAGNOSIS — Z01.811 PRE-OPERATIVE RESPIRATORY EXAMINATION: ICD-10-CM

## 2017-03-20 DIAGNOSIS — J30.0 VASOMOTOR RHINITIS: ICD-10-CM

## 2017-03-20 DIAGNOSIS — Z01.811 PRE-OPERATIVE RESPIRATORY EXAMINATION: Primary | ICD-10-CM

## 2017-03-20 LAB
ALLENS TEST: ABNORMAL
DELSYS: ABNORMAL
HCO3 UR-SCNC: 27.9 MMOL/L (ref 24–28)
PCO2 BLDA: 48.6 MMHG (ref 35–45)
PH SMN: 7.37 [PH] (ref 7.35–7.45)
PO2 BLDA: 66 MMHG (ref 80–100)
POC BE: 3 MMOL/L
POC SATURATED O2: 92 % (ref 95–100)
SAMPLE: ABNORMAL
SITE: ABNORMAL

## 2017-03-20 PROCEDURE — 3075F SYST BP GE 130 - 139MM HG: CPT | Mod: S$GLB,,, | Performed by: INTERNAL MEDICINE

## 2017-03-20 PROCEDURE — 99499 UNLISTED E&M SERVICE: CPT | Mod: S$GLB,,, | Performed by: INTERNAL MEDICINE

## 2017-03-20 PROCEDURE — 3079F DIAST BP 80-89 MM HG: CPT | Mod: S$GLB,,, | Performed by: INTERNAL MEDICINE

## 2017-03-20 PROCEDURE — 82803 BLOOD GASES ANY COMBINATION: CPT | Mod: S$GLB,,, | Performed by: INTERNAL MEDICINE

## 2017-03-20 PROCEDURE — 99999 PR PBB SHADOW E&M-EST. PATIENT-LVL IV: CPT | Mod: PBBFAC,,, | Performed by: INTERNAL MEDICINE

## 2017-03-20 PROCEDURE — 1160F RVW MEDS BY RX/DR IN RCRD: CPT | Mod: S$GLB,,, | Performed by: INTERNAL MEDICINE

## 2017-03-20 PROCEDURE — 94060 EVALUATION OF WHEEZING: CPT | Mod: S$GLB,,, | Performed by: INTERNAL MEDICINE

## 2017-03-20 PROCEDURE — 99215 OFFICE O/P EST HI 40 MIN: CPT | Mod: 25,S$GLB,, | Performed by: INTERNAL MEDICINE

## 2017-03-20 PROCEDURE — 1125F AMNT PAIN NOTED PAIN PRSNT: CPT | Mod: S$GLB,,, | Performed by: INTERNAL MEDICINE

## 2017-03-20 PROCEDURE — 1157F ADVNC CARE PLAN IN RCRD: CPT | Mod: S$GLB,,, | Performed by: INTERNAL MEDICINE

## 2017-03-20 PROCEDURE — 1159F MED LIST DOCD IN RCRD: CPT | Mod: S$GLB,,, | Performed by: INTERNAL MEDICINE

## 2017-03-20 PROCEDURE — 36600 WITHDRAWAL OF ARTERIAL BLOOD: CPT | Mod: 59,S$GLB,, | Performed by: INTERNAL MEDICINE

## 2017-03-20 RX ORDER — IPRATROPIUM BROMIDE 42 UG/1
2 SPRAY, METERED NASAL 4 TIMES DAILY
Qty: 15 ML | Refills: 11 | Status: SHIPPED | OUTPATIENT
Start: 2017-03-20 | End: 2018-02-20 | Stop reason: SDUPTHER

## 2017-03-20 RX ORDER — FLUTICASONE FUROATE AND VILANTEROL 200; 25 UG/1; UG/1
1 POWDER RESPIRATORY (INHALATION) DAILY
Qty: 60 EACH | Refills: 11 | Status: SHIPPED | OUTPATIENT
Start: 2017-03-20 | End: 2017-04-21 | Stop reason: ALTCHOICE

## 2017-03-20 RX ORDER — IPRATROPIUM BROMIDE AND ALBUTEROL SULFATE 2.5; .5 MG/3ML; MG/3ML
3 SOLUTION RESPIRATORY (INHALATION) EVERY 6 HOURS
Qty: 120 VIAL | Refills: 12 | Status: SHIPPED | OUTPATIENT
Start: 2017-03-20 | End: 2018-02-20 | Stop reason: SDUPTHER

## 2017-03-20 NOTE — PROGRESS NOTES
Subjective:       Patient ID: Maldonado Deleon is a 81 y.o. female.    Chief Complaint: She       Pre-op Exam    HPI     Preoperative evaluation for hip surgery  Dr. Godwin    Dyspnea  Patient complains of shortness of breath. Symptoms occur with one block walking with a walker. Symptoms began 5 years ago, gradually worsening since. Associated symptoms include  drainage from nose, dyspnea on exertion, post nasal drip and shortness of breath. She denies chest pain, located left chest. She does not have had recent travel. Weight has been stable. Symptoms are exacerbated by moderate activity. Symptoms are alleviated by rest.     COPD  She presents for evaluation and treatment of COPD. The patient is not currently have symptoms / an exacerbation. The patient has COPD for approximately 10 years. Symptoms in previous episodes have included dyspnea, cough and wheezing, and typically last 10 days. Previous episodes have been exacerbated by moderate activity. Current treatment includes albuterol inhaler, which generally provides some relief of symptoms.  She uses 1 pillows at night. Patient currently is  on home oxygen therapy at night .. The patient is having no constitutional symptoms, denying fever, chills, anorexia, or weight loss. The patient has not been hospitalized for this condition before. She quit smoking approximately 20 years ago. The patient is experiencing exercise intolerance (difficulty walking 1 blocks on flat ground and difficulty climbing 1 flights of stairs). Uses a walker to ambulate    Past Medical History:   Diagnosis Date    Atypical chest pain 8/26/2013    CAD (coronary artery disease)     CAD in native artery 10/5/2015    Carotid artery disease without cerebral infarction 8/26/2013    Colon cancer     resection and chemo.    COPD (chronic obstructive pulmonary disease)     Depression     Diabetes mellitus type II     Diaphragmatic hernia without obstruction and without gangrene 8/13/2015     Emphysema of lung     Encounter for blood transfusion     Gallstone pancreatitis     History of PTCA 10/5/2015    Hypertension     Lymphocytic colitis     Meningioma     OA (osteoarthritis)     Obesity 8/26/2013     Past Surgical History:   Procedure Laterality Date    4 hip surgeries      ABDOMINAL SURGERY      BACK SURGERY      bilateral knee surgery      CATARACT EXTRACTION      CHOLECYSTECTOMY      COLECTOMY      CORONARY ANGIOPLASTY      EYE SURGERY      HIP SURGERY      x 3 in one year.    PARATHYROIDECTOMY      SHOULDER SURGERY       Social History     Social History    Marital status:      Spouse name: N/A    Number of children: N/A    Years of education: N/A     Occupational History    Not on file.     Social History Main Topics    Smoking status: Former Smoker     Packs/day: 2.50     Years: 50.00     Types: Cigarettes     Quit date: 8/7/1987    Smokeless tobacco: Never Used    Alcohol use No    Drug use: No    Sexual activity: No     Other Topics Concern    Not on file     Social History Narrative     Review of Systems   Constitutional: Positive for fatigue. Negative for fever.   HENT: Positive for postnasal drip and rhinorrhea. Negative for congestion.    Respiratory: Positive for cough, shortness of breath, dyspnea on extertion, use of rescue inhaler and Paroxysmal Nocturnal Dyspnea. Negative for sputum production.    Cardiovascular: Negative for chest pain, palpitations and leg swelling.   Musculoskeletal: Positive for arthralgias and gait problem.   Skin: Negative for rash.   Gastrointestinal: Negative for nausea and abdominal pain.   Neurological: Negative for dizziness, syncope, weakness and light-headedness.   Hematological: Negative for adenopathy. Does not bruise/bleed easily.   Psychiatric/Behavioral: Negative for sleep disturbance. The patient is not nervous/anxious.        Objective:      Physical Exam   Constitutional: She is oriented to person, place, and  time. She appears well-developed and well-nourished.   HENT:   Head: Normocephalic and atraumatic.   Mouth/Throat: Oropharyngeal exudate present.   Eyes: Conjunctivae are normal. Pupils are equal, round, and reactive to light.   Neck: Neck supple. No JVD present. No tracheal deviation present. No thyromegaly present.   Cardiovascular: Normal rate, regular rhythm and normal heart sounds.    Pulmonary/Chest: Effort normal. No respiratory distress. She has decreased breath sounds. She has no wheezes. She has no rhonchi. She has no rales. She exhibits no tenderness.   Abdominal: Soft. Bowel sounds are normal.   Musculoskeletal: Normal range of motion. She exhibits no edema.   Lymphadenopathy:     She has no cervical adenopathy.   Neurological: She is alert and oriented to person, place, and time.   Skin: Skin is warm and dry.   Nursing note and vitals reviewed.    Personal Diagnostic Review  Chest x-ray: stable  Spirometry: mild / mod obstruction  Patient given an incentive spirometer and instructed on use of device. Instructed to use at least four times a day for 4-5 minute intervals with jet neb treatments.    No flowsheet data found.      Assessment:       1. Pre-operative respiratory examination    2. Chronic obstructive pulmonary disease, unspecified COPD type    3. Vasomotor rhinitis    4. Lung nodule - Hamartoma        Outpatient Encounter Prescriptions as of 3/20/2017   Medication Sig Dispense Refill    albuterol-ipratropium 2.5mg-0.5mg/3mL (DUO-NEB) 0.5 mg-3 mg(2.5 mg base)/3 mL nebulizer solution Take 3 mLs by nebulization every 6 (six) hours. 120 vial 12    amlodipine (NORVASC) 5 MG tablet TAKE 1 TABLET TWICE DAILY 180 tablet 3    aspirin (ECOTRIN) 81 MG EC tablet Take 81 mg by mouth once daily.      atenolol (TENORMIN) 25 MG tablet Take 1 tablet (25 mg total) by mouth once daily. 90 tablet 3    benazepril (LOTENSIN) 20 MG tablet Take 1 tablet (20 mg total) by mouth 2 (two) times daily. 180 tablet 3     clopidogrel (PLAVIX) 75 mg tablet TAKE ONE TABLET BY MOUTH ONCE DAILY 90 tablet 2    fluticasone (FLONASE) 50 mcg/actuation nasal spray 2 sprays by Each Nare route once daily. 1 Bottle 11    gabapentin (NEURONTIN) 300 MG capsule Take 1 capsule (300 mg total) by mouth 3 (three) times daily. 270 capsule 3    isosorbide mononitrate (IMDUR) 60 MG 24 hr tablet Take 1 tablet (60 mg total) by mouth 2 (two) times daily. 180 tablet 3    metformin (GLUCOPHAGE) 500 MG tablet Take 1 tablet (500 mg total) by mouth 2 (two) times daily with meals. 180 tablet 3    nebulizer accessories Kit Use with nebulizer 1 kit prn    nitroGLYCERIN (NITROSTAT) 0.4 MG SL tablet Place 1 tablet (0.4 mg total) under the tongue every 5 (five) minutes as needed for Chest pain. 60 tablet 12    oxycodone-acetaminophen (PERCOCET)  mg per tablet Take 1 tablet by mouth every 8 (eight) hours as needed. 90 tablet 0    pravastatin (PRAVACHOL) 20 MG tablet Take 1 tablet (20 mg total) by mouth once daily. 90 tablet 4    sertraline (ZOLOFT) 100 MG tablet Take 1 tablet (100 mg total) by mouth every evening. 90 tablet 3    [DISCONTINUED] albuterol-ipratropium 2.5mg-0.5mg/3mL (DUO-NEB) 0.5 mg-3 mg(2.5 mg base)/3 mL nebulizer solution Take 3 mLs by nebulization every 6 (six) hours. 120 vial 12    [DISCONTINUED] budesonide (PULMICORT) 0.5 mg/2 mL nebulizer solution Take 2 mLs (0.5 mg total) by nebulization 2 (two) times daily. Wash out mouth after using. 120 mL 12    fluticasone-vilanterol (BREO ELLIPTA) 200-25 mcg/dose DsDv diskus inhaler Inhale 1 puff into the lungs once daily. Controller 60 each 11    ipratropium (ATROVENT) 0.06 % nasal spray 2 sprays by Nasal route 4 (four) times daily. As needed for rhinitis 15 mL 11     No facility-administered encounter medications on file as of 3/20/2017.      Orders Placed This Encounter   Procedures    X-Ray Chest PA And Lateral     Standing Status:   Future     Standing Expiration Date:   3/20/2019      Order Specific Question:   Reason for Exam:     Answer:   SOB    Spirometry with/without bronchodilator     Standing Status:   Future     Standing Expiration Date:   3/20/2018     Plan:       Requested Prescriptions     Signed Prescriptions Disp Refills    fluticasone-vilanterol (BREO ELLIPTA) 200-25 mcg/dose DsDv diskus inhaler 60 each 11     Sig: Inhale 1 puff into the lungs once daily. Controller    albuterol-ipratropium 2.5mg-0.5mg/3mL (DUO-NEB) 0.5 mg-3 mg(2.5 mg base)/3 mL nebulizer solution 120 vial 12     Sig: Take 3 mLs by nebulization every 6 (six) hours.    ipratropium (ATROVENT) 0.06 % nasal spray 15 mL 11     Si sprays by Nasal route 4 (four) times daily. As needed for rhinitis     Pre-operative respiratory examination    Chronic obstructive pulmonary disease, unspecified COPD type  -     fluticasone-vilanterol (BREO ELLIPTA) 200-25 mcg/dose DsDv diskus inhaler; Inhale 1 puff into the lungs once daily. Controller  Dispense: 60 each; Refill: 11  -     albuterol-ipratropium 2.5mg-0.5mg/3mL (DUO-NEB) 0.5 mg-3 mg(2.5 mg base)/3 mL nebulizer solution; Take 3 mLs by nebulization every 6 (six) hours.  Dispense: 120 vial; Refill: 12  -     X-Ray Chest PA And Lateral; Future  -     Spirometry with/without bronchodilator; Future; Expected date: 17    Vasomotor rhinitis  -     ipratropium (ATROVENT) 0.06 % nasal spray; 2 sprays by Nasal route 4 (four) times daily. As needed for rhinitis  Dispense: 15 mL; Refill: 11    Lung nodule - Hamartoma           Return in about 1 year (around 3/20/2018) for cxr and percy.      Clearance for surgery:  The patient is at an increased risk of complications from surgery due to multiple medical problems including COPD. Vaccination status reviewed and updated. Risks of possible complications of surgery discussed in detail including risk of respiratory failure requiring mechanical ventilation, post operative pneumonia, deep venous thrombosis, pulmonary embolism and  death.  Pulmonary function studies, arterial blood gases and chest X ray reports are independently reviewed. Methods of improving lung function prior to surgery including incentive spirometry, regular use of bronchodilators, exercise and respiratory toilet discussed. Patient voices understanding of increased risks involved due to compromised pulmonary status and the need to comply with treatment plan prior to surgery.  The patient is cleared for anesthesia and surgery from a pulmonary standpoint.      MEDICAL DECISION MAKING: Moderate to high complexity.  Overall, the multiple problems listed are of moderate to high severity that may impact quality of life and activities of daily living. Side effects of medications, treatment plan as well as options and alternatives reviewed and discussed with patient. There was counseling of patient concerning these issues.    Total time spent in face to face counseling and coordination of care - 40  minutes over 50% of time was used in discussion of prognosis, risks, benefits of treatment, instructions and compliance with regimen . Discussion with other physicians or health care providers (DME, NP, pharmacy, respiratory therapy) occurred.

## 2017-03-20 NOTE — PROCEDURES
See ABGABG:  Results for BRENDA QUICK (MRN [id number]) as of 3/20/2017 13:12   Ref. Range 3/20/2017 10:07   POC PH Latest Ref Range: 7.35 - 7.45  7.366   POC PCO2 Latest Ref Range: 35 - 45 mmHg 48.6 (H)   POC PO2 Latest Ref Range: 80 - 100 mmHg 66 (L)   POC BE Latest Ref Range: -2 to 2 mmol/L 3   POC HCO3 Latest Ref Range: 24 - 28 mmol/L 27.9   POC SATURATED O2 Latest Ref Range: 95 - 100 % 92 (L)   Sample Unknown ARTERIAL   DelSys Unknown Room Air   Allens Test Unknown Pass   Site Unknown LR       Interpretation:  Arterial blood gases on room air are abnormal demonstrating mild hypoxemia.  Dale Patel MD  Pulmonary / Critical Care Medicine

## 2017-03-20 NOTE — PROCEDURES
Procedures     ABG:  Results for BRENDA QUICK (MRN [id number]) as of 3/20/2017 13:12   Ref. Range 3/20/2017 10:07   POC PH Latest Ref Range: 7.35 - 7.45  7.366   POC PCO2 Latest Ref Range: 35 - 45 mmHg 48.6 (H)   POC PO2 Latest Ref Range: 80 - 100 mmHg 66 (L)   POC BE Latest Ref Range: -2 to 2 mmol/L 3   POC HCO3 Latest Ref Range: 24 - 28 mmol/L 27.9   POC SATURATED O2 Latest Ref Range: 95 - 100 % 92 (L)   Sample Unknown ARTERIAL   DelSys Unknown Room Air   Allens Test Unknown Pass   Site Unknown LR       Interpretation:  Arterial blood gases on room air are abnormal demonstrating mild hypoxemia.  Dale Patel MD  Pulmonary / Critical Care Medicine

## 2017-03-20 NOTE — PATIENT INSTRUCTIONS
Fluticasone; Vilanterol inhalation powder  What is this medicine?  FLUTICASONE; VILANTEROL (floo TIK a sone; vye DUANE ter ol) inhalation is a combination of two medicines that decrease inflammation and help to open up the airways of your lungs. It is for chronic obstructive pulmonary disease (COPD), including chronic bronchitis or emphysema. It is also used for asthma in adults to help control symptoms. Do NOT use for an acute asthma attack or COPD attack.  How should I use this medicine?  This medicine is inhaled through the mouth. It is used once per day. Follow the directions on the prescription label. Do not use a spacer device with this inhaler. Take your medicine at regular intervals. Do not take your medicine more often than directed. Do not stop taking except on your doctor's advice. Make sure that you are using your inhaler correctly. Ask you doctor or health care provider if you have any questions.  A special MedGuide will be given to you by the pharmacist with each prescription and refill. Be sure to read this information carefully each time.  Talk to your pediatrician regarding the use of this medicine in children. Special care may be needed. This medicine is not approved for use in children under 18 years of age.  What side effects may I notice from receiving this medicine?  Side effects that you should report to your doctor or health care professional as soon as possible:  · allergic reactions like skin rash or hives, swelling of the face, lips, or tongue  · breathing problems right after inhaling your medicine  · changes in vision  · chest pain  · fast, irregular heartbeat  · feeling faint or lightheaded, falls  · fever or chills  · nausea, vomiting  · tiredness  Side effects that usually do not require medical attention (Report these to your doctor or health care professional if they continue or are bothersome.):  · cough  · headache  · nervousness  · sore throat  · tremor  What may interact with  this medicine?  Do not take this medicine with any of the following medications:  · cisapride  · dofetilide  · dronedarone  · MAOIs like Carbex, Eldepryl, Marplan, Nardil, and Parnate  · pimozide  · thioridazine  · ziprasidone  This medicine may also interact with the following medications:  · antiviral medicines for HIV or AIDS  · beta-blockers like metoprolol and propranolol  · certain medicines for depression, anxiety, or psychotic disturbances  · diuretics  · medicines for colds  · medicines for fungal infections like ketoconazole and itraconazole  · other medicines for breathing problems  · other medicines that prolong the QT interval (cause an abnormal heart rhythm)  What if I miss a dose?  If you miss a dose, use it as soon as you can. If it is almost time for your next dose, use only that dose and continue with your regular schedule. Do not use double or extra doses.  Where should I keep my medicine?  Keep out of the reach of children.  Store at room temperature between 15 and 30 degrees C (59 and 86 degrees F). Store in a dry place away from direct heat or sunlight. Throw away 6 weeks after you remove the inhaler from the foil tray, or after the dose indicator reads 0, whichever comes first. Throw away any unopened packages after the expiration date.  What should I tell my health care provider before I take this medicine?  They need to know if you have any of these conditions:  · bone problems  · immune system problems  · diabetes  · heart disease or irregular heartbeat  · high blood pressure  · infection  · pheochromocytoma  · seizures  · thyroid disease  · an unusual or allergic reaction to fluticasone, vilanterol, milk proteins, corticosteroids, other medicines, foods, dyes, or preservatives  · pregnant or trying to get pregnant  · breast-feeding  What should I watch for while using this medicine?  Visit your doctor or health care professional for regular checkups. Tell your doctor or health care  professional if your symptoms do not get better.  If your symptoms get worse or if you need your short-acting inhalers more often, call your doctor right away. Do not use this medicine more than every 24 hours.  If you are going to have surgery tell your doctor or health care professional that you are using this medicine. Try not to come in contact with people with the chicken pox or measles. If you do, call your doctor.  Date Last Reviewed:   NOTE:This sheet is a summary. It may not cover all possible information. If you have questions about this medicine, talk to your doctor, pharmacist, or health care provider. Copyright© 2016 [employer]        Ipratropium nasal spray  What is this medicine?  IPRATROPIUM (i pra LORIE peñaloza um) is used to relieve a runny nose due to seasonal allergies or non allergic causes, like a cold. This medicine does not help with nasal congestion or sneezing.  How should I use this medicine?  This medicine is for use only in the nose. Follow the directions on the prescription label. Do not use more often than directed. Do not share this medicine with anyone else. Make sure that you are using your nasal spray correctly. Ask you doctor or health care provider if you have any questions.  Talk to your pediatrician regarding the use of this medicine in children. While this drug may be prescribed for children as young as 6 years of age for selected conditions, precautions do apply.  What side effects may I notice from receiving this medicine?  Side effects that you should report to your doctor or health care professional as soon as possible:  · allergic reactions like skin rash, itching or hives, swelling of the face, lips, or tongue or difficulty breathing  · chest pain or fast heartbeat  · dizziness or fainting spell  · eye pain or change in vision  · infection  Side effects that usually do not require medical attention (report to your doctor or health care professional if they continue or are  bothersome):  · dry eyes, mouth or nose  · irritation in the nose or throat  · nausea  · nosebleeds  · trouble passing urine  · unusual taste  What may interact with this medicine?  · atropine, hyoscyamine, and related medications  · medicines for motion sickness or dizziness  · medicines for overactive bladder  · some medicines for colds  · some medicines for stomach problems  What if I miss a dose?  If you miss a dose, use it as soon as you can. If it is almost time for your next dose, use only that dose. Do not use double or extra doses.  Where should I keep my medicine?  Keep out of the reach of children.  Store at room temperature between 15 and 30 degrees C (59 and 86 degrees F). Avoid excessive humidity. Do not freeze. Throw away any unused medicine after the expiration date.  What should I tell my health care provider before I take this medicine?  They need to know if you have any of these conditions:  · bladder problems, difficulty passing urine  · glaucoma  · prostate trouble  · an unusual or allergic reaction to ipratropium, atropine, bromides, soya flour or protein, soybeans or peanuts, peanut oil, other medicines, foods, dyes, or preservatives  · pregnant or trying to get pregnant  · breast-feeding  What should I watch for while using this medicine?  Tell your doctor or health care professional if your symptoms do not improve. Do not use extra medicine. This medicine should start to work within a day or two of treatment.  Do not get this spray in your eyes. It can cause irritation, pain, or blurred vision. If you do get any in your eyes, rinse out with plenty of cool tap water and call your health care provider.  Date Last Reviewed:   NOTE:This sheet is a summary. It may not cover all possible information. If you have questions about this medicine, talk to your doctor, pharmacist, or health care provider. Copyright© 2016 [employer]        Using an Incentive Spirometer  Soon after your surgery, a  nurse or therapist will teach you breathing exercises. These keep your lungs clear, strengthen your breathing muscles, and help prevent complications.  The exercises include doing a deep-breathing exercise using a device called an incentive spirometer.  To do these exercises, you will breathe in through your mouth and not your nose. The incentive spirometer only works correctly if you breathe in through your mouth.     Deep breathing expands the lungs, aids circulation, and helps prevent pneumonia.   Four steps to clear lungs  Step 1. Exhale normally.  · Relax and breathe out.  Step 2. Place your lips tightly around the mouthpiece.  · Make sure the device is upright and not tilted.  Step 3. Inhale as much air as you can through the mouthpiece (don't breath through your nose).  · Inhale slowly and deeply.  · Hold your breath long enough to keep the balls or disk raised for at least 3 seconds.  · Some spirometers have an indicator to let you know that you are breathing in too fast. If the indicator goes off, breathe in more slowly.  Step 4. Repeat the exercise regularly.  · Do this exercise every hour while you're awake, or as your healthcare provider tells you to.  · You will also be taught coughing exercises and be asked to do them regularly on your own.  Date Last Reviewed: 10/17/2014  © 2345-2609 [employer]. [address] [place] [address], [place] [address]. All rights reserved. This information is not intended as a substitute for professional medical care. Always follow your healthcare professional's instructions.        Discharge Instructions: Using an Incentive Spirometer  An incentive spirometer is a device that helps you do deep breathing exercises. These exercises will help you breathe better and improve the function of your lungs. The incentive spirometer gives you a way to take an active part in your recovery.  Follow these steps to use your incentive spirometer  Inhale normally. Relax and breathe  out:  · Place your lips tightly around the mouthpiece.  · Make sure the device is upright and not tilted.  · Inhale as much air as you can through the mouthpiece (don't breathe through your nose). Some spirometers have an indicator to let you know that you are breathing in too fast. If the indicator goes off, breathe in more slowly.  · Hold your breath long enough to keep the balls (or disk) raised for at least 5 seconds.  · Do this exercise every hour while youre awake or as often as your healthcare provider instructs.  · If you were also taught coughing exercises, do them regularly as instructed.  Follow-up care  Make a follow-up appointment as directed by our staff.     When to call the healthcare provider  Call your healthcare provider right away if you have any of the following:  · Shortness of breath that doesn't get better after taking your medicine  · Chest pain  · Fever of 100.4°F (38°C) or higher, or as directed by your healthcare provider  · Brownish, bloody, or smelly sputum  · Blue lips or fingernails   Date Last Reviewed: 5/1/2016  © 7745-8245 [employer]. [address] [place] [address], [place] [address]. All rights reserved. This information is not intended as a substitute for professional medical care. Always follow your healthcare professional's instructions.

## 2017-03-20 NOTE — MR AVS SNAPSHOT
[facility] - Pulmonary Services  [address] [facility] [address]  [facility] [address]  Phone: [phone]  Fax: [phone]                  Maldonado Deleon   3/20/2017 10:20 AM   Office Visit    Description:  Female : 1935   Provider:  Dale Patel MD   Department:  [facility] - Pul[facility] Services           Reason for Visit     Pre-op Exam           Diagnoses this Visit        Comments    Pre-operative respiratory examination    -  Primary     Chronic obstructive pulmonary disease, unspecified COPD type         Vasomotor rhinitis         Lung nodule                To Do List           Future Appointments        Provider Department Dept Phone    3/27/2017 11:40 AM Kurt Taylor MD [facility] Cardiology [phone]    2017 10:30 AM Christopher Gutierrez Sr., MD [facility] Orthopedics [phone]    2017 1:00 PM Tiffanie Spivey PT [facility]     3/12/2018 9:00 AM [facility] XR2 [facility] [phone]    3/12/2018 9:20 AM PULMONARY LAB, [facility] Pul[facility] Function [facility] [phone]      Your Future Surgeries/Procedures     Mar 29, 2017   Surgery with Christopher Gutierrez Sr., MD   [facility]  ([facility])    [id number] [place] [id number] [phone]              Goals (5 Years of Data)     None      Follow-Up and Disposition     Return in about 1 year (around 3/20/2018) for cxr and percy.    Follow-up and Disposition History       These Medications        Disp Refills Start End    fluticasone-vilanterol (BREO ELLIPTA) 200-25 mcg/dose DsDv diskus inhaler 60 each 11 3/20/2017     Inhale 1 puff into the lungs once daily. Controller - Inhalation    Pharmacy: [id number] - [facility] [address] [facility] #: [phone]       albuterol-ipratropium 2.5mg-0.5mg/3mL (DUO-NEB) 0.5 mg-3 mg(2.5 mg base)/3 mL nebulizer solution 120 vial 12 3/20/2017 3/20/2018    Take 3 mLs by nebulization every 6 (six) hours. -  Nebulization    Pharmacy: [facility] [address] [place], [place] [id number] [facility] #: [phone]       ipratropium (ATROVENT) 0.06 % nasal spray 15 mL 11 3/20/2017     2 sprays by Nasal route 4 (four) times daily. As needed for rhinitis - Nasal    Pharmacy: [facility] [address] [place], [place] [id number] [facility] #: [phone]         [facility]     [facility] On Call Nurse Care Line -  Assistance  Registered nurses in the [facility] On Call Center provide clinical advisement, health education, appointment booking, and other advisory services.  Call for this free service at [phone].             Medications           Message regarding Medications     Verify the changes and/or additions to your medication regime listed below are the same as discussed with your clinician today.  If any of these changes or additions are incorrect, please notify your healthcare provider.        START taking these NEW medications        Refills    fluticasone-vilanterol (BREO ELLIPTA) 200-25 mcg/dose DsDv diskus inhaler 11    Sig: Inhale 1 puff into the lungs once daily. Controller    Class: Normal    Route: Inhalation    ipratropium (ATROVENT) 0.06 % nasal spray 11    Si sprays by Nasal route 4 (four) times daily. As needed for rhinitis    Class: Normal    Route: Nasal      STOP taking these medications     budesonide (PULMICORT) 0.5 mg/2 mL nebulizer solution Take 2 mLs (0.5 mg total) by nebulization 2 (two) times daily. Wash out mouth after using.           Verify that the below list of medications is an accurate representation of the medications you are currently taking.  If none reported, the list may be blank. If incorrect, please contact your healthcare provider. Carry this list with you in case of emergency.           Current Medications     albuterol-ipratropium 2.5mg-0.5mg/3mL (DUO-NEB) 0.5 mg-3 mg(2.5 mg base)/3 mL nebulizer solution Take 3 mLs by nebulization every 6 (six) hours.     "amlodipine (NORVASC) 5 MG tablet TAKE 1 TABLET TWICE DAILY    aspirin (ECOTRIN) 81 MG EC tablet Take 81 mg by mouth once daily.    atenolol (TENORMIN) 25 MG tablet Take 1 tablet (25 mg total) by mouth once daily.    benazepril (LOTENSIN) 20 MG tablet Take 1 tablet (20 mg total) by mouth 2 (two) times daily.    clopidogrel (PLAVIX) 75 mg tablet TAKE ONE TABLET BY MOUTH ONCE DAILY    fluticasone (FLONASE) 50 mcg/actuation nasal spray 2 sprays by Each Nare route once daily.    gabapentin (NEURONTIN) 300 MG capsule Take 1 capsule (300 mg total) by mouth 3 (three) times daily.    isosorbide mononitrate (IMDUR) 60 MG 24 hr tablet Take 1 tablet (60 mg total) by mouth 2 (two) times daily.    metformin (GLUCOPHAGE) 500 MG tablet Take 1 tablet (500 mg total) by mouth 2 (two) times daily with meals.    nebulizer accessories Kit Use with nebulizer    nitroGLYCERIN (NITROSTAT) 0.4 MG SL tablet Place 1 tablet (0.4 mg total) under the tongue every 5 (five) minutes as needed for Chest pain.    oxycodone-acetaminophen (PERCOCET)  mg per tablet Take 1 tablet by mouth every 8 (eight) hours as needed.    pravastatin (PRAVACHOL) 20 MG tablet Take 1 tablet (20 mg total) by mouth once daily.    sertraline (ZOLOFT) 100 MG tablet Take 1 tablet (100 mg total) by mouth every evening.    fluticasone-vilanterol (BREO ELLIPTA) 200-25 mcg/dose DsDv diskus inhaler Inhale 1 puff into the lungs once daily. Controller    ipratropium (ATROVENT) 0.06 % nasal spray 2 sprays by Nasal route 4 (four) times daily. As needed for rhinitis           Clinical Reference Information           Your Vitals Were     BP Pulse Height Weight SpO2 BMI    130/82 72 5' 4" (1.626 m) 99.8 kg (220 lb 0.3 oz) 93% 37.77 kg/m2      Blood Pressure          Most Recent Value    BP  130/82      Allergies as of 3/20/2017     No Known Allergies      Immunizations Administered on Date of Encounter - 3/20/2017     None      Orders Placed During Today's Visit     Future " Labs/Procedures Expected by Expires    Spirometry with/without bronchodilator  9/20/2017 (Approximate) 3/20/2018    X-Ray Chest PA And Lateral  As directed 3/20/2019      Instructions      Fluticasone; Vilanterol inhalation powder  What is this medicine?  FLUTICASONE; VILANTEROL (floo TIK a sone; vye DUANE ter ol) inhalation is a combination of two medicines that decrease inflammation and help to open up the airways of your lungs. It is for chronic obstructive pulmonary disease (COPD), including chronic bronchitis or emphysema. It is also used for asthma in adults to help control symptoms. Do NOT use for an acute asthma attack or COPD attack.  How should I use this medicine?  This medicine is inhaled through the mouth. It is used once per day. Follow the directions on the prescription label. Do not use a spacer device with this inhaler. Take your medicine at regular intervals. Do not take your medicine more often than directed. Do not stop taking except on your doctor's advice. Make sure that you are using your inhaler correctly. Ask you doctor or health care provider if you have any questions.  A special MedGuide will be given to you by the pharmacist with each prescription and refill. Be sure to read this information carefully each time.  Talk to your pediatrician regarding the use of this medicine in children. Special care may be needed. This medicine is not approved for use in children under 18 years of age.  What side effects may I notice from receiving this medicine?  Side effects that you should report to your doctor or health care professional as soon as possible:  · allergic reactions like skin rash or hives, swelling of the face, lips, or tongue  · breathing problems right after inhaling your medicine  · changes in vision  · chest pain  · fast, irregular heartbeat  · feeling faint or lightheaded, falls  · fever or chills  · nausea, vomiting  · tiredness  Side effects that usually do not require medical  attention (Report these to your doctor or health care professional if they continue or are bothersome.):  · cough  · headache  · nervousness  · sore throat  · tremor  What may interact with this medicine?  Do not take this medicine with any of the following medications:  · cisapride  · dofetilide  · dronedarone  · MAOIs like Carbex, Eldepryl, Marplan, Nardil, and Parnate  · pimozide  · thioridazine  · ziprasidone  This medicine may also interact with the following medications:  · antiviral medicines for HIV or AIDS  · beta-blockers like metoprolol and propranolol  · certain medicines for depression, anxiety, or psychotic disturbances  · diuretics  · medicines for colds  · medicines for fungal infections like ketoconazole and itraconazole  · other medicines for breathing problems  · other medicines that prolong the QT interval (cause an abnormal heart rhythm)  What if I miss a dose?  If you miss a dose, use it as soon as you can. If it is almost time for your next dose, use only that dose and continue with your regular schedule. Do not use double or extra doses.  Where should I keep my medicine?  Keep out of the reach of children.  Store at room temperature between 15 and 30 degrees C (59 and 86 degrees F). Store in a dry place away from direct heat or sunlight. Throw away 6 weeks after you remove the inhaler from the foil tray, or after the dose indicator reads 0, whichever comes first. Throw away any unopened packages after the expiration date.  What should I tell my health care provider before I take this medicine?  They need to know if you have any of these conditions:  · bone problems  · immune system problems  · diabetes  · heart disease or irregular heartbeat  · high blood pressure  · infection  · pheochromocytoma  · seizures  · thyroid disease  · an unusual or allergic reaction to fluticasone, vilanterol, milk proteins, corticosteroids, other medicines, foods, dyes, or preservatives  · pregnant or trying to  get pregnant  · breast-feeding  What should I watch for while using this medicine?  Visit your doctor or health care professional for regular checkups. Tell your doctor or health care professional if your symptoms do not get better.  If your symptoms get worse or if you need your short-acting inhalers more often, call your doctor right away. Do not use this medicine more than every 24 hours.  If you are going to have surgery tell your doctor or health care professional that you are using this medicine. Try not to come in contact with people with the chicken pox or measles. If you do, call your doctor.  Date Last Reviewed:   NOTE:This sheet is a summary. It may not cover all possible information. If you have questions about this medicine, talk to your doctor, pharmacist, or health care provider. Copyright© 2016 [employer]        Ipratropium nasal spray  What is this medicine?  IPRATROPIUM (i lisa peñaloza um) is used to relieve a runny nose due to seasonal allergies or non allergic causes, like a cold. This medicine does not help with nasal congestion or sneezing.  How should I use this medicine?  This medicine is for use only in the nose. Follow the directions on the prescription label. Do not use more often than directed. Do not share this medicine with anyone else. Make sure that you are using your nasal spray correctly. Ask you doctor or health care provider if you have any questions.  Talk to your pediatrician regarding the use of this medicine in children. While this drug may be prescribed for children as young as 6 years of age for selected conditions, precautions do apply.  What side effects may I notice from receiving this medicine?  Side effects that you should report to your doctor or health care professional as soon as possible:  · allergic reactions like skin rash, itching or hives, swelling of the face, lips, or tongue or difficulty breathing  · chest pain or fast heartbeat  · dizziness or fainting  spell  · eye pain or change in vision  · infection  Side effects that usually do not require medical attention (report to your doctor or health care professional if they continue or are bothersome):  · dry eyes, mouth or nose  · irritation in the nose or throat  · nausea  · nosebleeds  · trouble passing urine  · unusual taste  What may interact with this medicine?  · atropine, hyoscyamine, and related medications  · medicines for motion sickness or dizziness  · medicines for overactive bladder  · some medicines for colds  · some medicines for stomach problems  What if I miss a dose?  If you miss a dose, use it as soon as you can. If it is almost time for your next dose, use only that dose. Do not use double or extra doses.  Where should I keep my medicine?  Keep out of the reach of children.  Store at room temperature between 15 and 30 degrees C (59 and 86 degrees F). Avoid excessive humidity. Do not freeze. Throw away any unused medicine after the expiration date.  What should I tell my health care provider before I take this medicine?  They need to know if you have any of these conditions:  · bladder problems, difficulty passing urine  · glaucoma  · prostate trouble  · an unusual or allergic reaction to ipratropium, atropine, bromides, soya flour or protein, soybeans or peanuts, peanut oil, other medicines, foods, dyes, or preservatives  · pregnant or trying to get pregnant  · breast-feeding  What should I watch for while using this medicine?  Tell your doctor or health care professional if your symptoms do not improve. Do not use extra medicine. This medicine should start to work within a day or two of treatment.  Do not get this spray in your eyes. It can cause irritation, pain, or blurred vision. If you do get any in your eyes, rinse out with plenty of cool tap water and call your health care provider.  Date Last Reviewed:   NOTE:This sheet is a summary. It may not cover all possible information. If you have  questions about this medicine, talk to your doctor, pharmacist, or health care provider. Copyright© 2016 [employer]        Using an Incentive Spirometer  Soon after your surgery, a nurse or therapist will teach you breathing exercises. These keep your lungs clear, strengthen your breathing muscles, and help prevent complications.  The exercises include doing a deep-breathing exercise using a device called an incentive spirometer.  To do these exercises, you will breathe in through your mouth and not your nose. The incentive spirometer only works correctly if you breathe in through your mouth.     Deep breathing expands the lungs, aids circulation, and helps prevent pneumonia.   Four steps to clear lungs  Step 1. Exhale normally.  · Relax and breathe out.  Step 2. Place your lips tightly around the mouthpiece.  · Make sure the device is upright and not tilted.  Step 3. Inhale as much air as you can through the mouthpiece (don't breath through your nose).  · Inhale slowly and deeply.  · Hold your breath long enough to keep the balls or disk raised for at least 3 seconds.  · Some spirometers have an indicator to let you know that you are breathing in too fast. If the indicator goes off, breathe in more slowly.  Step 4. Repeat the exercise regularly.  · Do this exercise every hour while you're awake, or as your healthcare provider tells you to.  · You will also be taught coughing exercises and be asked to do them regularly on your own.  Date Last Reviewed: 10/17/2014  © 1004-7762 [employer]. [address] [place] [address], [place] [address]. All rights reserved. This information is not intended as a substitute for professional medical care. Always follow your healthcare professional's instructions.        Discharge Instructions: Using an Incentive Spirometer  An incentive spirometer is a device that helps you do deep breathing exercises. These exercises will help you breathe better and improve the function of  your lungs. The incentive spirometer gives you a way to take an active part in your recovery.  Follow these steps to use your incentive spirometer  Inhale normally. Relax and breathe out:  · Place your lips tightly around the mouthpiece.  · Make sure the device is upright and not tilted.  · Inhale as much air as you can through the mouthpiece (don't breathe through your nose). Some spirometers have an indicator to let you know that you are breathing in too fast. If the indicator goes off, breathe in more slowly.  · Hold your breath long enough to keep the balls (or disk) raised for at least 5 seconds.  · Do this exercise every hour while youre awake or as often as your healthcare provider instructs.  · If you were also taught coughing exercises, do them regularly as instructed.  Follow-up care  Make a follow-up appointment as directed by our staff.     When to call the healthcare provider  Call your healthcare provider right away if you have any of the following:  · Shortness of breath that doesn't get better after taking your medicine  · Chest pain  · Fever of 100.4°F (38°C) or higher, or as directed by your healthcare provider  · Brownish, bloody, or smelly sputum  · Blue lips or fingernails   Date Last Reviewed: 5/1/2016 © 2000-2016 [employer]. [address] [place] [address]. All rights reserved. This information is not intended as a substitute for professional medical care. Always follow your healthcare professional's instructions.             Language Assistance Services     ATTENTION: Language assistance services are available, free of charge. Please call [phone].      ATENCIÓN: Si habla español, tiene a rinaldi disposición servicios gratuitos de asistencia lingüística. Llame al [phone].     [facility] Ý: N?u b?n nói Ti?ng Vi?t, có các d?ch v? h? tr? ngôn ng? mi?n phí dành cho b?n. G?i s? [phone].         [facility] - Pul[facility] complies with applicable Federal civil  rights laws and does not discriminate on the basis of race, color, national origin, age, disability, or sex.

## 2017-03-21 ENCOUNTER — TELEPHONE (OUTPATIENT)
Dept: CARDIOLOGY | Facility: CLINIC | Age: 82
End: 2017-03-21

## 2017-03-21 ENCOUNTER — TELEPHONE (OUTPATIENT)
Dept: ORTHOPEDICS | Facility: CLINIC | Age: 82
End: 2017-03-21

## 2017-03-21 NOTE — TELEPHONE ENCOUNTER
Pt may proceed with hip surgery at moderate CV risk.  May hold asa and plavix for 5 days.    Dr Taylor

## 2017-03-21 NOTE — TELEPHONE ENCOUNTER
----- Message from Val Johnston sent at 3/21/2017  2:44 PM CDT -----  Contact: Pt  Pt is requesting to speak with the nurse concerning stopping her plavix. Pt can be reached at [phone] (home)

## 2017-03-21 NOTE — TELEPHONE ENCOUNTER
----- Message from Val Johnston sent at 3/21/2017  2:43 PM CDT -----  Contact: Pt   Pt is requesting to speak with the nurse in regards to an upcoming procedure she is having./ States she knows she has to stop plavix but she be off the aspirin as well./ Please advise [phone] (home)

## 2017-03-21 NOTE — TELEPHONE ENCOUNTER
Returned pt phone call. Pt wanted to know when to stop taking Plavix. Informed pt to call her cardiologist and follow the instructions of her cardiologist. Pt verified understanding.

## 2017-03-21 NOTE — TELEPHONE ENCOUNTER
Patient needs cardiac clearance to have hip replacement. Needs clearance to hold ASA and Plavix. How long prior? Please advise?

## 2017-03-22 NOTE — TELEPHONE ENCOUNTER
I have attempted without success to contact this patient by phone. Left message to return call.

## 2017-03-22 NOTE — TELEPHONE ENCOUNTER
----- Message from Lisa Calhoun sent at 3/22/2017 11:44 AM CDT -----  Contact: Patient  Patient returned call to Ana. She can be contacted at [phone] (home).    Thanks,   Lisa

## 2017-03-24 ENCOUNTER — TELEPHONE (OUTPATIENT)
Dept: INTERNAL MEDICINE | Facility: CLINIC | Age: 82
End: 2017-03-24

## 2017-03-24 RX ORDER — ASCORBIC ACID 1000 MG
175 TABLET ORAL DAILY
COMMUNITY

## 2017-03-24 RX ORDER — MULTIVITAMIN
1 TABLET ORAL DAILY
COMMUNITY
End: 2017-04-21

## 2017-03-24 RX ORDER — CALCIUM CARBONATE 600 MG
600 TABLET ORAL DAILY
COMMUNITY

## 2017-03-24 NOTE — TELEPHONE ENCOUNTER
----- Message from Justine Wu sent at 3/24/2017 12:42 PM CDT -----  Contact: Patient  Is requesting a refill on her pen and diabetic test strips- [facility]. Please call her back if needed at [phone]. Thank you

## 2017-03-24 NOTE — PRE ADMISSION SCREENING
Pre op instructions reviewed with patient per phone:    To confirm, Your surgeon has instructed you:  Surgery is scheduled 03/29/17 at 1015.      Please report to [facility] 1st floor main lobby by 0845  Pre admit office to call afternoon prior to surgery with final arrival time.      INSTRUCTIONS IMPORTANT!!!  ¨ Do not eat, drink, or smoke after 12 midnight-including water. OK to brush teeth, no gum, candy or mints!    ¨ Take only these medicines with a small swallow of water-morning of surgery.  Benazepril, Amlodipine, Isosorbide, use DuoNeb        ____  Do not wear makeup, including mascara.  ____  No powder, lotions or creams to surgical area.  ____  Please remove all jewelry, including piercings and leave at home.  ____  No money or valuables needed. Please leave at home.  ____  Please bring identification and insurance information to hospital.  ____  If going home the same day, arrange for a ride home. You will not be able to   drive if Anesthesia was used.  ____  Children, under 12 years old, must remain in the waiting room with an adult.  They are not allowed in patient areas.  ____  Wear loose fitting clothing. Allow for dressings, bandages.  ____  Stop Aspirin, Ibuprofen, Motrin and Aleve at least 5-7 days before surgery, unless otherwise instructed by your doctor, or the nurse.   You MAY use Tylenol/acetaminophen until day of surgery.  ____  If you take diabetic medication, do not take am of surgery unless instructed by   Doctor.  ____ Stop taking any Fish Oil supplement or any Vitamins that contain Vitamin E at least 5 days prior to surgery.          Bathing Instructions-- The night before surgery and the morning prior to coming to the hospital:   -Do not shave the surgical area.   -Shower and wash your hair and body as usual with anti-bacterial  soap and shampoo.   -Rinse your hair and body completely.   -Use one packet of hibiclens to wash the surgical site (using your hand)  gently for 5 minutes.  Do not scrub you skin too hard.   -Do not use hibiclens on your head, face, or genitals.   -Do not wash with anti-bacterial soap after you use the hibiclens.   -Rinse your body thoroughly.   -Dry with clean, soft towel.  Do not use lotion, cream, deodorant, or powders on   the surgical site.    Use antibacterial soap in place of hibiclens if your surgery is on the head, face or genitals.         Surgical Site Infection    Prevention of surgical site infections:     -Keep incisions clean and dry.   -Do not soak/submerge incisions in water until completely healed.   -Do not apply lotions, powders, creams, or deodorants to site.   -Always make sure hands are cleaned with antibacterial soap/ alcohol-based   prior to touching the surgical site.  (This includes doctors, nurses, staff, and yourself.)    Signs and symptoms:   -Redness and pain around the area where you had surgery   -Drainage of cloudy fluid from your surgical wound   -Fever over 100.4  I have read or had read and explained to me, and understand the above information.

## 2017-03-27 ENCOUNTER — TELEPHONE (OUTPATIENT)
Dept: INTERNAL MEDICINE | Facility: CLINIC | Age: 82
End: 2017-03-27

## 2017-03-27 ENCOUNTER — TELEPHONE (OUTPATIENT)
Dept: ORTHOPEDICS | Facility: CLINIC | Age: 82
End: 2017-03-27

## 2017-03-27 NOTE — TELEPHONE ENCOUNTER
Contacted pt regarding questions about taking pain medication before surgery. Per NOEMI Armenta, pt is ok to take pain medication up to 12 hrs prior to surgery

## 2017-03-27 NOTE — TELEPHONE ENCOUNTER
----- Message from Anna Shepard sent at 3/27/2017  1:48 PM CDT -----  Contact: pt  States she needs a refill on her test strips and lancet today, she is out. Pt uses     [facility] [address] [place] [address] [facility]  [address] [facility] [address]  Phone: [phone] Fax: [phone]    Please call pt at [phone]. Thank you

## 2017-03-28 ENCOUNTER — TELEPHONE (OUTPATIENT)
Dept: INTERNAL MEDICINE | Facility: CLINIC | Age: 82
End: 2017-03-28

## 2017-03-28 ENCOUNTER — ANESTHESIA EVENT (OUTPATIENT)
Dept: SURGERY | Facility: HOSPITAL | Age: 82
DRG: 470 | End: 2017-03-28
Payer: MEDICARE

## 2017-03-28 LAB
PRE FEV1 FVC: 65.69 % (ref 64.23–83.82)
PRE FEV1: 1.41 L (ref 1.39–2.56)
PRE FVC: 2.14 L (ref 1.96–3.34)
PRE PEF: 1.72 L/S (ref 3.18–6.6)

## 2017-03-29 ENCOUNTER — HOSPITAL ENCOUNTER (INPATIENT)
Facility: HOSPITAL | Age: 82
LOS: 6 days | Discharge: NURSING, REHAB OR LONG-TERM CARE | DRG: 470 | End: 2017-04-04
Attending: ORTHOPAEDIC SURGERY | Admitting: ORTHOPAEDIC SURGERY
Payer: MEDICARE

## 2017-03-29 ENCOUNTER — ANESTHESIA (OUTPATIENT)
Dept: SURGERY | Facility: HOSPITAL | Age: 82
DRG: 470 | End: 2017-03-29
Payer: MEDICARE

## 2017-03-29 DIAGNOSIS — I25.10 CAD IN NATIVE ARTERY: ICD-10-CM

## 2017-03-29 DIAGNOSIS — T14.8XXA HEMATOMA: ICD-10-CM

## 2017-03-29 DIAGNOSIS — E11.9 CONTROLLED TYPE 2 DIABETES MELLITUS WITHOUT COMPLICATION, WITHOUT LONG-TERM CURRENT USE OF INSULIN: ICD-10-CM

## 2017-03-29 DIAGNOSIS — K44.9 DIAPHRAGMATIC HERNIA WITHOUT OBSTRUCTION AND WITHOUT GANGRENE: Chronic | ICD-10-CM

## 2017-03-29 DIAGNOSIS — D50.9 IRON DEFICIENCY ANEMIA, UNSPECIFIED: ICD-10-CM

## 2017-03-29 DIAGNOSIS — E78.2 MIXED HYPERLIPIDEMIA: ICD-10-CM

## 2017-03-29 DIAGNOSIS — R91.1 LUNG NODULE: ICD-10-CM

## 2017-03-29 DIAGNOSIS — J30.0 VASOMOTOR RHINITIS: ICD-10-CM

## 2017-03-29 DIAGNOSIS — D50.0 ANEMIA DUE TO CHRONIC BLOOD LOSS: Primary | ICD-10-CM

## 2017-03-29 DIAGNOSIS — Z98.890 POST-OPERATIVE STATE: Primary | ICD-10-CM

## 2017-03-29 DIAGNOSIS — I51.89 DIASTOLIC DYSFUNCTION: ICD-10-CM

## 2017-03-29 DIAGNOSIS — M54.2 NECK PAIN: ICD-10-CM

## 2017-03-29 DIAGNOSIS — F41.9 ANXIETY: ICD-10-CM

## 2017-03-29 DIAGNOSIS — M25.552 LEFT HIP PAIN: ICD-10-CM

## 2017-03-29 DIAGNOSIS — Z98.61 HISTORY OF PTCA: ICD-10-CM

## 2017-03-29 DIAGNOSIS — R09.81 SINUS CONGESTION: ICD-10-CM

## 2017-03-29 DIAGNOSIS — G89.4 CHRONIC PAIN SYNDROME: ICD-10-CM

## 2017-03-29 DIAGNOSIS — M16.12 ARTHRITIS OF LEFT HIP: ICD-10-CM

## 2017-03-29 DIAGNOSIS — I20.9 AP (ANGINA PECTORIS): ICD-10-CM

## 2017-03-29 DIAGNOSIS — Z01.818 PREOP EXAM FOR INTERNAL MEDICINE: ICD-10-CM

## 2017-03-29 DIAGNOSIS — M79.89 LEFT LEG SWELLING: ICD-10-CM

## 2017-03-29 DIAGNOSIS — R93.89 ABNORMAL CXR (CHEST X-RAY): ICD-10-CM

## 2017-03-29 DIAGNOSIS — I77.9 CAROTID ARTERY DISEASE WITHOUT CEREBRAL INFARCTION: ICD-10-CM

## 2017-03-29 LAB
ABO + RH BLD: NORMAL
BLD GP AB SCN CELLS X3 SERPL QL: NORMAL
HCT VFR BLD AUTO: 35 %
POCT GLUCOSE: 108 MG/DL (ref 70–110)
POCT GLUCOSE: 155 MG/DL (ref 70–110)
POCT GLUCOSE: 158 MG/DL (ref 70–110)

## 2017-03-29 PROCEDURE — 86900 BLOOD TYPING SEROLOGIC ABO: CPT

## 2017-03-29 PROCEDURE — 63600175 PHARM REV CODE 636 W HCPCS: Performed by: ANESTHESIOLOGY

## 2017-03-29 PROCEDURE — 0SPB04Z REMOVAL OF INTERNAL FIXATION DEVICE FROM LEFT HIP JOINT, OPEN APPROACH: ICD-10-PCS | Performed by: ORTHOPAEDIC SURGERY

## 2017-03-29 PROCEDURE — 37000009 HC ANESTHESIA EA ADD 15 MINS: Performed by: ORTHOPAEDIC SURGERY

## 2017-03-29 PROCEDURE — 88311 DECALCIFY TISSUE: CPT | Mod: 26,,, | Performed by: PATHOLOGY

## 2017-03-29 PROCEDURE — 88305 TISSUE EXAM BY PATHOLOGIST: CPT | Mod: 26,,, | Performed by: PATHOLOGY

## 2017-03-29 PROCEDURE — 25000003 PHARM REV CODE 250: Performed by: ANESTHESIOLOGY

## 2017-03-29 PROCEDURE — 27130 TOTAL HIP ARTHROPLASTY: CPT | Mod: 22,LT,, | Performed by: ORTHOPAEDIC SURGERY

## 2017-03-29 PROCEDURE — 25000003 PHARM REV CODE 250: Performed by: ORTHOPAEDIC SURGERY

## 2017-03-29 PROCEDURE — 63600175 PHARM REV CODE 636 W HCPCS: Performed by: ORTHOPAEDIC SURGERY

## 2017-03-29 PROCEDURE — 71000033 HC RECOVERY, INTIAL HOUR: Performed by: ORTHOPAEDIC SURGERY

## 2017-03-29 PROCEDURE — 27201423 OPTIME MED/SURG SUP & DEVICES STERILE SUPPLY: Performed by: ORTHOPAEDIC SURGERY

## 2017-03-29 PROCEDURE — 0SRB0JZ REPLACEMENT OF LEFT HIP JOINT WITH SYNTHETIC SUBSTITUTE, OPEN APPROACH: ICD-10-PCS | Performed by: ORTHOPAEDIC SURGERY

## 2017-03-29 PROCEDURE — 86850 RBC ANTIBODY SCREEN: CPT

## 2017-03-29 PROCEDURE — 36415 COLL VENOUS BLD VENIPUNCTURE: CPT

## 2017-03-29 PROCEDURE — 27800903 OPTIME MED/SURG SUP & DEVICES OTHER IMPLANTS: Performed by: ORTHOPAEDIC SURGERY

## 2017-03-29 PROCEDURE — 88305 TISSUE EXAM BY PATHOLOGIST: CPT | Performed by: PATHOLOGY

## 2017-03-29 PROCEDURE — 25000003 PHARM REV CODE 250: Performed by: NURSE ANESTHETIST, CERTIFIED REGISTERED

## 2017-03-29 PROCEDURE — 88300 SURGICAL PATH GROSS: CPT | Mod: 26,,, | Performed by: PATHOLOGY

## 2017-03-29 PROCEDURE — 63600175 PHARM REV CODE 636 W HCPCS: Performed by: NURSE PRACTITIONER

## 2017-03-29 PROCEDURE — 83036 HEMOGLOBIN GLYCOSYLATED A1C: CPT

## 2017-03-29 PROCEDURE — C1776 JOINT DEVICE (IMPLANTABLE): HCPCS | Performed by: ORTHOPAEDIC SURGERY

## 2017-03-29 PROCEDURE — 25000003 PHARM REV CODE 250: Performed by: NURSE PRACTITIONER

## 2017-03-29 PROCEDURE — 36000710: Performed by: ORTHOPAEDIC SURGERY

## 2017-03-29 PROCEDURE — 37000008 HC ANESTHESIA 1ST 15 MINUTES: Performed by: ORTHOPAEDIC SURGERY

## 2017-03-29 PROCEDURE — 88304 TISSUE EXAM BY PATHOLOGIST: CPT | Mod: 26,,, | Performed by: PATHOLOGY

## 2017-03-29 PROCEDURE — 36000711: Performed by: ORTHOPAEDIC SURGERY

## 2017-03-29 PROCEDURE — 63600175 PHARM REV CODE 636 W HCPCS: Performed by: NURSE ANESTHETIST, CERTIFIED REGISTERED

## 2017-03-29 PROCEDURE — C1769 GUIDE WIRE: HCPCS | Performed by: ORTHOPAEDIC SURGERY

## 2017-03-29 PROCEDURE — 71000039 HC RECOVERY, EACH ADD'L HOUR: Performed by: ORTHOPAEDIC SURGERY

## 2017-03-29 PROCEDURE — 11000001 HC ACUTE MED/SURG PRIVATE ROOM

## 2017-03-29 PROCEDURE — 85014 HEMATOCRIT: CPT

## 2017-03-29 DEVICE — LINER ACET ALTRX NEUT 32X50MM: Type: IMPLANTABLE DEVICE | Site: HIP | Status: FUNCTIONAL

## 2017-03-29 DEVICE — PLUG ELIM HOLE POSITIVE STOP: Type: IMPLANTABLE DEVICE | Site: HIP | Status: FUNCTIONAL

## 2017-03-29 DEVICE — HEAD FEM TAP ART +5X32MM COCR: Type: IMPLANTABLE DEVICE | Site: HIP | Status: FUNCTIONAL

## 2017-03-29 RX ORDER — MEPERIDINE HYDROCHLORIDE 50 MG/ML
12.5 INJECTION INTRAMUSCULAR; INTRAVENOUS; SUBCUTANEOUS ONCE AS NEEDED
Status: DISCONTINUED | OUTPATIENT
Start: 2017-03-29 | End: 2017-03-29 | Stop reason: HOSPADM

## 2017-03-29 RX ORDER — CEFAZOLIN SODIUM 2 G/50ML
2 SOLUTION INTRAVENOUS
Status: COMPLETED | OUTPATIENT
Start: 2017-03-29 | End: 2017-03-30

## 2017-03-29 RX ORDER — SODIUM CHLORIDE, SODIUM LACTATE, POTASSIUM CHLORIDE, CALCIUM CHLORIDE 600; 310; 30; 20 MG/100ML; MG/100ML; MG/100ML; MG/100ML
INJECTION, SOLUTION INTRAVENOUS CONTINUOUS
Status: DISCONTINUED | OUTPATIENT
Start: 2017-03-29 | End: 2017-03-29

## 2017-03-29 RX ORDER — ACETAMINOPHEN 10 MG/ML
1000 INJECTION, SOLUTION INTRAVENOUS EVERY 6 HOURS
Status: COMPLETED | OUTPATIENT
Start: 2017-03-29 | End: 2017-03-29

## 2017-03-29 RX ORDER — OXYCODONE AND ACETAMINOPHEN 10; 325 MG/1; MG/1
1 TABLET ORAL EVERY 4 HOURS PRN
Qty: 90 TABLET | Refills: 0 | Status: SHIPPED | OUTPATIENT
Start: 2017-03-29 | End: 2017-04-21 | Stop reason: ALTCHOICE

## 2017-03-29 RX ORDER — ATENOLOL 25 MG/1
25 TABLET ORAL NIGHTLY
Status: DISCONTINUED | OUTPATIENT
Start: 2017-03-29 | End: 2017-04-04 | Stop reason: HOSPADM

## 2017-03-29 RX ORDER — MORPHINE SULFATE 10 MG/ML
2 INJECTION INTRAMUSCULAR; INTRAVENOUS; SUBCUTANEOUS EVERY 5 MIN PRN
Status: COMPLETED | OUTPATIENT
Start: 2017-03-29 | End: 2017-03-29

## 2017-03-29 RX ORDER — SERTRALINE HYDROCHLORIDE 50 MG/1
100 TABLET, FILM COATED ORAL NIGHTLY
Status: DISCONTINUED | OUTPATIENT
Start: 2017-03-29 | End: 2017-04-04 | Stop reason: HOSPADM

## 2017-03-29 RX ORDER — IPRATROPIUM BROMIDE 42 UG/1
2 SPRAY, METERED NASAL 4 TIMES DAILY
Status: DISCONTINUED | OUTPATIENT
Start: 2017-03-29 | End: 2017-04-04 | Stop reason: HOSPADM

## 2017-03-29 RX ORDER — NITROGLYCERIN 0.4 MG/1
0.4 TABLET SUBLINGUAL EVERY 5 MIN PRN
Status: DISCONTINUED | OUTPATIENT
Start: 2017-03-29 | End: 2017-04-04 | Stop reason: HOSPADM

## 2017-03-29 RX ORDER — BENAZEPRIL HYDROCHLORIDE 20 MG/1
20 TABLET ORAL 2 TIMES DAILY
Status: DISCONTINUED | OUTPATIENT
Start: 2017-03-29 | End: 2017-04-04 | Stop reason: HOSPADM

## 2017-03-29 RX ORDER — MORPHINE SULFATE 2 MG/ML
2 INJECTION, SOLUTION INTRAMUSCULAR; INTRAVENOUS EVERY 4 HOURS PRN
Status: DISCONTINUED | OUTPATIENT
Start: 2017-03-29 | End: 2017-04-03

## 2017-03-29 RX ORDER — CLOPIDOGREL BISULFATE 75 MG/1
75 TABLET ORAL DAILY
Status: DISCONTINUED | OUTPATIENT
Start: 2017-03-30 | End: 2017-04-04 | Stop reason: HOSPADM

## 2017-03-29 RX ORDER — PROPOFOL 10 MG/ML
VIAL (ML) INTRAVENOUS
Status: DISCONTINUED | OUTPATIENT
Start: 2017-03-29 | End: 2017-03-29

## 2017-03-29 RX ORDER — ESMOLOL HYDROCHLORIDE 10 MG/ML
INJECTION INTRAVENOUS
Status: DISCONTINUED | OUTPATIENT
Start: 2017-03-29 | End: 2017-03-29

## 2017-03-29 RX ORDER — LIDOCAINE HYDROCHLORIDE 10 MG/ML
1 INJECTION, SOLUTION EPIDURAL; INFILTRATION; INTRACAUDAL; PERINEURAL ONCE
Status: DISCONTINUED | OUTPATIENT
Start: 2017-03-29 | End: 2017-03-29 | Stop reason: HOSPADM

## 2017-03-29 RX ORDER — INSULIN ASPART 100 [IU]/ML
1-10 INJECTION, SOLUTION INTRAVENOUS; SUBCUTANEOUS
Status: DISCONTINUED | OUTPATIENT
Start: 2017-03-29 | End: 2017-04-04 | Stop reason: HOSPADM

## 2017-03-29 RX ORDER — ARFORMOTEROL TARTRATE 15 UG/2ML
15 SOLUTION RESPIRATORY (INHALATION) 2 TIMES DAILY
Status: DISCONTINUED | OUTPATIENT
Start: 2017-03-30 | End: 2017-04-04 | Stop reason: HOSPADM

## 2017-03-29 RX ORDER — ONDANSETRON 8 MG/1
8 TABLET, ORALLY DISINTEGRATING ORAL EVERY 8 HOURS PRN
Status: DISCONTINUED | OUTPATIENT
Start: 2017-03-29 | End: 2017-04-04 | Stop reason: HOSPADM

## 2017-03-29 RX ORDER — SUCCINYLCHOLINE CHLORIDE 20 MG/ML
INJECTION INTRAMUSCULAR; INTRAVENOUS
Status: DISCONTINUED | OUTPATIENT
Start: 2017-03-29 | End: 2017-03-29

## 2017-03-29 RX ORDER — GLYCOPYRROLATE 0.2 MG/ML
INJECTION INTRAMUSCULAR; INTRAVENOUS
Status: DISCONTINUED | OUTPATIENT
Start: 2017-03-29 | End: 2017-03-29

## 2017-03-29 RX ORDER — MUPIROCIN 20 MG/G
1 OINTMENT TOPICAL
Status: COMPLETED | OUTPATIENT
Start: 2017-03-29 | End: 2017-03-29

## 2017-03-29 RX ORDER — OXYCODONE HYDROCHLORIDE 5 MG/1
10 TABLET ORAL
Status: DISCONTINUED | OUTPATIENT
Start: 2017-03-29 | End: 2017-04-03

## 2017-03-29 RX ORDER — AMLODIPINE BESYLATE 5 MG/1
5 TABLET ORAL 2 TIMES DAILY
Status: DISCONTINUED | OUTPATIENT
Start: 2017-03-29 | End: 2017-03-29

## 2017-03-29 RX ORDER — NEOMYCIN AND POLYMYXIN B SULFATES 40; 200000 MG/ML; [USP'U]/ML
SOLUTION IRRIGATION
Status: DISCONTINUED | OUTPATIENT
Start: 2017-03-29 | End: 2017-03-29 | Stop reason: HOSPADM

## 2017-03-29 RX ORDER — GLUCAGON 1 MG
1 KIT INJECTION
Status: DISCONTINUED | OUTPATIENT
Start: 2017-03-29 | End: 2017-04-04 | Stop reason: HOSPADM

## 2017-03-29 RX ORDER — IBUPROFEN 200 MG
16 TABLET ORAL
Status: DISCONTINUED | OUTPATIENT
Start: 2017-03-29 | End: 2017-04-04 | Stop reason: HOSPADM

## 2017-03-29 RX ORDER — FENTANYL CITRATE 50 UG/ML
INJECTION, SOLUTION INTRAMUSCULAR; INTRAVENOUS
Status: DISCONTINUED | OUTPATIENT
Start: 2017-03-29 | End: 2017-03-29

## 2017-03-29 RX ORDER — FORMOTEROL FUMARATE DIHYDRATE 20 UG/2ML
20 SOLUTION RESPIRATORY (INHALATION) EVERY 12 HOURS
Status: DISCONTINUED | OUTPATIENT
Start: 2017-03-30 | End: 2017-03-29 | Stop reason: RX

## 2017-03-29 RX ORDER — OXYCODONE HYDROCHLORIDE 5 MG/1
5 TABLET ORAL
Status: DISCONTINUED | OUTPATIENT
Start: 2017-03-29 | End: 2017-03-29 | Stop reason: HOSPADM

## 2017-03-29 RX ORDER — PRAVASTATIN SODIUM 20 MG/1
20 TABLET ORAL NIGHTLY
Status: DISCONTINUED | OUTPATIENT
Start: 2017-03-29 | End: 2017-04-04 | Stop reason: HOSPADM

## 2017-03-29 RX ORDER — LIDOCAINE HCL/PF 100 MG/5ML
SYRINGE (ML) INTRAVENOUS
Status: DISCONTINUED | OUTPATIENT
Start: 2017-03-29 | End: 2017-03-29

## 2017-03-29 RX ORDER — IBUPROFEN 200 MG
24 TABLET ORAL
Status: DISCONTINUED | OUTPATIENT
Start: 2017-03-29 | End: 2017-04-04 | Stop reason: HOSPADM

## 2017-03-29 RX ORDER — PHENYLEPHRINE HYDROCHLORIDE 10 MG/ML
INJECTION INTRAVENOUS
Status: DISCONTINUED | OUTPATIENT
Start: 2017-03-29 | End: 2017-03-29

## 2017-03-29 RX ORDER — OXYCODONE HYDROCHLORIDE 5 MG/1
10 TABLET ORAL
Status: DISCONTINUED | OUTPATIENT
Start: 2017-03-29 | End: 2017-03-29

## 2017-03-29 RX ORDER — CEFAZOLIN SODIUM 1 G/3ML
INJECTION, POWDER, FOR SOLUTION INTRAMUSCULAR; INTRAVENOUS
Status: DISCONTINUED | OUTPATIENT
Start: 2017-03-29 | End: 2017-03-29 | Stop reason: HOSPADM

## 2017-03-29 RX ORDER — BACITRACIN 50000 [IU]/1
INJECTION, POWDER, FOR SOLUTION INTRAMUSCULAR
Status: DISCONTINUED | OUTPATIENT
Start: 2017-03-29 | End: 2017-03-29 | Stop reason: HOSPADM

## 2017-03-29 RX ORDER — RAMELTEON 8 MG/1
8 TABLET ORAL NIGHTLY PRN
Status: DISCONTINUED | OUTPATIENT
Start: 2017-03-29 | End: 2017-04-04 | Stop reason: HOSPADM

## 2017-03-29 RX ORDER — MORPHINE SULFATE 2 MG/ML
2 INJECTION, SOLUTION INTRAMUSCULAR; INTRAVENOUS EVERY 4 HOURS PRN
Status: DISCONTINUED | OUTPATIENT
Start: 2017-03-29 | End: 2017-03-29

## 2017-03-29 RX ORDER — ISOSORBIDE MONONITRATE 60 MG/1
60 TABLET, EXTENDED RELEASE ORAL 2 TIMES DAILY
Status: DISCONTINUED | OUTPATIENT
Start: 2017-03-30 | End: 2017-04-04 | Stop reason: HOSPADM

## 2017-03-29 RX ORDER — SODIUM CHLORIDE 9 MG/ML
INJECTION, SOLUTION INTRAVENOUS CONTINUOUS
Status: DISCONTINUED | OUTPATIENT
Start: 2017-03-29 | End: 2017-03-30

## 2017-03-29 RX ORDER — SODIUM CHLORIDE, SODIUM LACTATE, POTASSIUM CHLORIDE, CALCIUM CHLORIDE 600; 310; 30; 20 MG/100ML; MG/100ML; MG/100ML; MG/100ML
INJECTION, SOLUTION INTRAVENOUS CONTINUOUS PRN
Status: DISCONTINUED | OUTPATIENT
Start: 2017-03-29 | End: 2017-03-29

## 2017-03-29 RX ORDER — ROCURONIUM BROMIDE 10 MG/ML
INJECTION, SOLUTION INTRAVENOUS
Status: DISCONTINUED | OUTPATIENT
Start: 2017-03-29 | End: 2017-03-29

## 2017-03-29 RX ORDER — CEFAZOLIN SODIUM 2 G/50ML
2 SOLUTION INTRAVENOUS
Status: DISCONTINUED | OUTPATIENT
Start: 2017-03-29 | End: 2017-03-29 | Stop reason: HOSPADM

## 2017-03-29 RX ORDER — SODIUM CHLORIDE 9 MG/ML
3 INJECTION, SOLUTION INTRAMUSCULAR; INTRAVENOUS; SUBCUTANEOUS
Status: DISCONTINUED | OUTPATIENT
Start: 2017-03-29 | End: 2017-04-04 | Stop reason: HOSPADM

## 2017-03-29 RX ORDER — ONDANSETRON 2 MG/ML
INJECTION INTRAMUSCULAR; INTRAVENOUS
Status: DISCONTINUED | OUTPATIENT
Start: 2017-03-29 | End: 2017-03-29

## 2017-03-29 RX ORDER — NEOSTIGMINE METHYLSULFATE 1 MG/ML
INJECTION, SOLUTION INTRAVENOUS
Status: DISCONTINUED | OUTPATIENT
Start: 2017-03-29 | End: 2017-03-29

## 2017-03-29 RX ORDER — IPRATROPIUM BROMIDE AND ALBUTEROL SULFATE 2.5; .5 MG/3ML; MG/3ML
3 SOLUTION RESPIRATORY (INHALATION) EVERY 6 HOURS PRN
Status: DISCONTINUED | OUTPATIENT
Start: 2017-03-29 | End: 2017-04-04 | Stop reason: HOSPADM

## 2017-03-29 RX ORDER — GABAPENTIN 300 MG/1
300 CAPSULE ORAL 3 TIMES DAILY
Status: DISCONTINUED | OUTPATIENT
Start: 2017-03-29 | End: 2017-04-04 | Stop reason: HOSPADM

## 2017-03-29 RX ORDER — ACETAMINOPHEN 10 MG/ML
INJECTION, SOLUTION INTRAVENOUS
Status: DISCONTINUED | OUTPATIENT
Start: 2017-03-29 | End: 2017-03-29

## 2017-03-29 RX ADMIN — MORPHINE SULFATE 2 MG: 10 INJECTION, SOLUTION INTRAMUSCULAR; INTRAVENOUS at 03:03

## 2017-03-29 RX ADMIN — SODIUM CHLORIDE: 0.9 INJECTION, SOLUTION INTRAVENOUS at 05:03

## 2017-03-29 RX ADMIN — INSULIN ASPART 1 UNITS: 100 INJECTION, SOLUTION INTRAVENOUS; SUBCUTANEOUS at 10:03

## 2017-03-29 RX ADMIN — FENTANYL CITRATE 25 MCG: 50 INJECTION, SOLUTION INTRAMUSCULAR; INTRAVENOUS at 01:03

## 2017-03-29 RX ADMIN — ESMOLOL HYDROCHLORIDE 20 MG: 10 INJECTION, SOLUTION INTRAVENOUS at 12:03

## 2017-03-29 RX ADMIN — ACETAMINOPHEN 1000 MG: 10 INJECTION, SOLUTION INTRAVENOUS at 05:03

## 2017-03-29 RX ADMIN — ROCURONIUM BROMIDE 30 MG: 10 INJECTION, SOLUTION INTRAVENOUS at 12:03

## 2017-03-29 RX ADMIN — GABAPENTIN 300 MG: 300 CAPSULE ORAL at 09:03

## 2017-03-29 RX ADMIN — LIDOCAINE HYDROCHLORIDE 80 MG: 20 INJECTION, SOLUTION INTRAVENOUS at 10:03

## 2017-03-29 RX ADMIN — OXYCODONE HYDROCHLORIDE 10 MG: 5 TABLET ORAL at 05:03

## 2017-03-29 RX ADMIN — GLYCOPYRROLATE 0.2 MG: 0.2 INJECTION, SOLUTION INTRAMUSCULAR; INTRAVENOUS at 11:03

## 2017-03-29 RX ADMIN — SERTRALINE HYDROCHLORIDE 100 MG: 50 TABLET, FILM COATED ORAL at 09:03

## 2017-03-29 RX ADMIN — SUCCINYLCHOLINE CHLORIDE 140 MG: 20 INJECTION, SOLUTION INTRAMUSCULAR; INTRAVENOUS at 10:03

## 2017-03-29 RX ADMIN — ROCURONIUM BROMIDE 45 MG: 10 INJECTION, SOLUTION INTRAVENOUS at 11:03

## 2017-03-29 RX ADMIN — SODIUM CHLORIDE, SODIUM LACTATE, POTASSIUM CHLORIDE, AND CALCIUM CHLORIDE: 600; 310; 30; 20 INJECTION, SOLUTION INTRAVENOUS at 10:03

## 2017-03-29 RX ADMIN — PHENYLEPHRINE HYDROCHLORIDE 100 MCG: 10 INJECTION INTRAVENOUS at 02:03

## 2017-03-29 RX ADMIN — GLYCOPYRROLATE 0.8 MG: 0.2 INJECTION, SOLUTION INTRAMUSCULAR; INTRAVENOUS at 02:03

## 2017-03-29 RX ADMIN — IPRATROPIUM BROMIDE 2 SPRAY: 42 SPRAY NASAL at 07:03

## 2017-03-29 RX ADMIN — PRAVASTATIN SODIUM 20 MG: 20 TABLET ORAL at 09:03

## 2017-03-29 RX ADMIN — PROPOFOL 150 MG: 10 INJECTION, EMULSION INTRAVENOUS at 10:03

## 2017-03-29 RX ADMIN — ONDANSETRON 4 MG: 2 INJECTION, SOLUTION INTRAMUSCULAR; INTRAVENOUS at 02:03

## 2017-03-29 RX ADMIN — ESMOLOL HYDROCHLORIDE 20 MG: 10 INJECTION, SOLUTION INTRAVENOUS at 01:03

## 2017-03-29 RX ADMIN — RAMELTEON 8 MG: 8 TABLET, FILM COATED ORAL at 11:03

## 2017-03-29 RX ADMIN — ATENOLOL 25 MG: 25 TABLET ORAL at 09:03

## 2017-03-29 RX ADMIN — FENTANYL CITRATE 100 MCG: 50 INJECTION, SOLUTION INTRAMUSCULAR; INTRAVENOUS at 10:03

## 2017-03-29 RX ADMIN — MORPHINE SULFATE 2 MG: 10 INJECTION, SOLUTION INTRAMUSCULAR; INTRAVENOUS at 04:03

## 2017-03-29 RX ADMIN — ROCURONIUM BROMIDE 20 MG: 10 INJECTION, SOLUTION INTRAVENOUS at 01:03

## 2017-03-29 RX ADMIN — ROCURONIUM BROMIDE 5 MG: 10 INJECTION, SOLUTION INTRAVENOUS at 10:03

## 2017-03-29 RX ADMIN — CEFAZOLIN SODIUM 2 G: 2 SOLUTION INTRAVENOUS at 07:03

## 2017-03-29 RX ADMIN — OXYCODONE HYDROCHLORIDE 10 MG: 5 TABLET ORAL at 09:03

## 2017-03-29 RX ADMIN — ACETAMINOPHEN 1000 MG: 10 INJECTION, SOLUTION INTRAVENOUS at 02:03

## 2017-03-29 RX ADMIN — CEFAZOLIN SODIUM 2 G: 2 SOLUTION INTRAVENOUS at 11:03

## 2017-03-29 RX ADMIN — FENTANYL CITRATE 50 MCG: 50 INJECTION, SOLUTION INTRAMUSCULAR; INTRAVENOUS at 12:03

## 2017-03-29 RX ADMIN — MUPIROCIN 1 TUBE: 20 OINTMENT TOPICAL at 11:03

## 2017-03-29 RX ADMIN — ACETAMINOPHEN 1000 MG: 10 INJECTION, SOLUTION INTRAVENOUS at 11:03

## 2017-03-29 RX ADMIN — IPRATROPIUM BROMIDE 2 SPRAY: 42 SPRAY NASAL at 11:03

## 2017-03-29 RX ADMIN — SODIUM CHLORIDE, SODIUM LACTATE, POTASSIUM CHLORIDE, AND CALCIUM CHLORIDE: 600; 310; 30; 20 INJECTION, SOLUTION INTRAVENOUS at 11:03

## 2017-03-29 RX ADMIN — MORPHINE SULFATE 2 MG: 2 INJECTION, SOLUTION INTRAMUSCULAR; INTRAVENOUS at 10:03

## 2017-03-29 RX ADMIN — NEOSTIGMINE METHYLSULFATE 5 MG: 1 INJECTION INTRAVENOUS at 02:03

## 2017-03-29 RX ADMIN — BENAZEPRIL HYDROCHLORIDE 20 MG: 20 TABLET, FILM COATED ORAL at 09:03

## 2017-03-29 NOTE — IP AVS SNAPSHOT
[facility]  [id number] [place] [address] [address] [place] [address]           Patient Discharge Instructions   Our goal is to set you up for success. This packet includes information on your condition, medications, and your home care.  It will help you care for yourself to prevent having to return to the hospital.     Please ask your nurse if you have any questions.      There are many details to remember when preparing to leave the hospital. Here is what you will need to do:    1. Take your medicine. If you are prescribed medications, review your Medication List on the following pages. You may have new medications to  at the pharmacy and others that you'll need to stop taking. Review the instructions for how and when to take your medications. Talk with your doctor or nurses if you are unsure of what to do.     2. Go to your follow-up appointments. Specific follow-up information is listed in the following pages. Your may be contacted by a nurse or clinical provider about future appointments. Be sure we have all of the phone numbers to reach you. Please contact your provider's office if you are unable to make an appointment.     3. Watch for warning signs. Your doctor or nurse will give you detailed warning signs to watch for and when to call for assistance. These instructions may also include educational information about your condition. If you experience any of warning signs to your health, call your doctor.           [facility]  Unless otherwise directed by your provider, please   contact [facility], our nurse care line   that is available for 24/7 assistance.     [phone] (toll-free)     Registered nurses in the [facility]   provide: appointment scheduling, clinical advisement, health education, and other advisory services.                  ** Verify the list of medication(s) below is accurate and up to date. Carry this with you in case of emergency. If your  medications have changed, please notify your healthcare provider.             Medication List      START taking these medications        Additional Info                      oxycodone 15 MG Tab   Commonly known as:  ROXICODONE   Refills:  0   Dose:  15 mg    Last time this was given:  10 mg on 4/4/2017  9:38 AM   Instructions:  Take 1 tablet (15 mg total) by mouth every 4 (four) hours as needed for Pain (severe pain 7-10/10 pain scale).     Begin Date    AM    Noon    PM    Bedtime       ramelteon 8 mg tablet   Commonly known as:  ROZEREM   Refills:  0   Dose:  8 mg    Last time this was given:  8 mg on 3/29/2017 11:40 PM   Instructions:  Take 1 tablet (8 mg total) by mouth nightly as needed for Insomnia.     Begin Date    AM    Noon    PM    Bedtime         ASK your doctor about these medications        Additional Info                      albuterol-ipratropium 2.5mg-0.5mg/3mL 0.5 mg-3 mg(2.5 mg base)/3 mL nebulizer solution   Commonly known as:  DUO-NEB   Quantity:  120 vial   Refills:  12   Dose:  3 mL    Last time this was given:  3 mLs on 3/30/2017 11:53 PM   Instructions:  Take 3 mLs by nebulization every 6 (six) hours.     Begin Date    AM    Noon    PM    Bedtime       amlodipine 5 MG tablet   Commonly known as:  NORVASC   Quantity:  180 tablet   Refills:  3    Instructions:  TAKE 1 TABLET TWICE DAILY     Begin Date    AM    Noon    PM    Bedtime       aspirin 81 MG EC tablet   Commonly known as:  ECOTRIN   Refills:  0   Dose:  81 mg    Instructions:  Take 81 mg by mouth once daily.     Begin Date    AM    Noon    PM    Bedtime       atenolol 25 MG tablet   Commonly known as:  TENORMIN   Quantity:  90 tablet   Refills:  3   Dose:  25 mg    Last time this was given:  25 mg on 4/3/2017  9:11 PM   Instructions:  Take 1 tablet (25 mg total) by mouth once daily.     Begin Date    AM    Noon    PM    Bedtime       benazepril 20 MG tablet   Commonly known as:  LOTENSIN   Quantity:  180 tablet   Refills:  3   Dose:   20 mg    Last time this was given:  20 mg on 4/4/2017  9:06 AM   Instructions:  Take 1 tablet (20 mg total) by mouth 2 (two) times daily.     Begin Date    AM    Noon    PM    Bedtime       calcium carbonate 600 mg (1,500 mg) Tab   Commonly known as:  OS-RICHY   Refills:  0   Dose:  600 mg    Instructions:  Take 600 mg by mouth once daily.     Begin Date    AM    Noon    PM    Bedtime       clopidogrel 75 mg tablet   Commonly known as:  PLAVIX   Quantity:  90 tablet   Refills:  2    Last time this was given:  75 mg on 4/4/2017  9:06 AM   Instructions:  TAKE ONE TABLET BY MOUTH ONCE DAILY     Begin Date    AM    Noon    PM    Bedtime       fluticasone 50 mcg/actuation nasal spray   Commonly known as:  FLONASE   Quantity:  1 Bottle   Refills:  11   Dose:  2 spray    Instructions:  2 sprays by Each Nare route once daily.     Begin Date    AM    Noon    PM    Bedtime       fluticasone-vilanterol 200-25 mcg/dose Dsdv diskus inhaler   Commonly known as:  BREO ELLIPTA   Quantity:  60 each   Refills:  11   Dose:  1 puff    Instructions:  Inhale 1 puff into the lungs once daily. Controller     Begin Date    AM    Noon    PM    Bedtime       gabapentin 300 MG capsule   Commonly known as:  NEURONTIN   Quantity:  270 capsule   Refills:  3   Dose:  300 mg    Last time this was given:  300 mg on 4/4/2017  1:16 PM   Instructions:  Take 1 capsule (300 mg total) by mouth 3 (three) times daily.     Begin Date    AM    Noon    PM    Bedtime       ipratropium 0.06 % nasal spray   Commonly known as:  ATROVENT   Quantity:  15 mL   Refills:  11   Dose:  2 spray    Last time this was given:  2 sprays on 4/4/2017 11:40 AM   Instructions:  2 sprays by Nasal route 4 (four) times daily. As needed for rhinitis     Begin Date    AM    Noon    PM    Bedtime       isosorbide mononitrate 60 MG 24 hr tablet   Commonly known as:  IMDUR   Quantity:  180 tablet   Refills:  3   Dose:  60 mg    Last time this was given:  60 mg on 4/4/2017  9:06 AM    Instructions:  Take 1 tablet (60 mg total) by mouth 2 (two) times daily.     Begin Date    AM    Noon    PM    Bedtime       metformin 500 MG tablet   Commonly known as:  GLUCOPHAGE   Quantity:  180 tablet   Refills:  3   Dose:  500 mg   Ask about: Should I take this medication?    Instructions:  Take 1 tablet (500 mg total) by mouth 2 (two) times daily with meals.     Begin Date    AM    Noon    PM    Bedtime       milk thistle 175 mg tablet   Refills:  0   Dose:  175 mg    Instructions:  Take 175 mg by mouth once daily.     Begin Date    AM    Noon    PM    Bedtime       nebulizer accessories Kit   Quantity:  1 kit   Refills:  prn    Instructions:  Use with nebulizer     Begin Date    AM    Noon    PM    Bedtime       nitroGLYCERIN 0.4 MG SL tablet   Commonly known as:  NITROSTAT   Quantity:  60 tablet   Refills:  12   Dose:  0.4 mg    Instructions:  Place 1 tablet (0.4 mg total) under the tongue every 5 (five) minutes as needed for Chest pain.     Begin Date    AM    Noon    PM    Bedtime       ONE DAILY MULTIVITAMIN per tablet   Refills:  0   Dose:  1 tablet   Generic drug:  multivitamin    Instructions:  Take 1 tablet by mouth once daily.     Begin Date    AM    Noon    PM    Bedtime       oxycodone-acetaminophen  mg per tablet   Commonly known as:  PERCOCET   Quantity:  90 tablet   Refills:  0   Dose:  1 tablet   What changed:    - when to take this  - reasons to take this    Instructions:  Take 1 tablet by mouth every 4 (four) hours as needed for Pain.     Begin Date    AM    Noon    PM    Bedtime       pravastatin 20 MG tablet   Commonly known as:  PRAVACHOL   Quantity:  90 tablet   Refills:  4   Dose:  20 mg    Last time this was given:  20 mg on 4/3/2017  9:10 PM   Instructions:  Take 1 tablet (20 mg total) by mouth once daily.     Begin Date    AM    Noon    PM    Bedtime       sertraline 100 MG tablet   Commonly known as:  ZOLOFT   Quantity:  90 tablet   Refills:  3   Dose:  100 mg    Last time  this was given:  100 mg on 4/3/2017  9:10 PM   Instructions:  Take 1 tablet (100 mg total) by mouth every evening.     Begin Date    AM    Noon    PM    Bedtime            Where to Get Your Medications      You can get these medications from any pharmacy     Bring a paper prescription for each of these medications     oxycodone-acetaminophen  mg per tablet         Information about where to get these medications is not yet available     ! Ask your nurse or doctor about these medications     oxycodone 15 MG Tab    ramelteon 8 mg tablet                  Please bring to all follow up appointments:    1. A copy of your discharge instructions.  2. All medicines you are currently taking in their original bottles.  3. Identification and insurance card.    Please arrive 15 minutes ahead of scheduled appointment time.    Please call 24 hours in advance if you must reschedule your appointment and/or time.        Your Scheduled Appointments     Apr 18, 2017  1:00 PM CDT   New Physical Therapy Patient with Tiffanie Spivey, SUSAN   [facility]    [address] [facility] [place] [address]               Apr 20, 2017 10:30 AM CDT   Post OP with Christopher Gutierrez Sr., MD   [facility] - Orthopedics ([facility])    [address] [facility] [place] [address]   [phone]            Mar 12, 2018  9:00 AM CDT   Diagnostic Xray with [facility] XR2   [facility]    [address]  [place] [address]   [phone]            Mar 12, 2018  9:20 AM CDT   Spirometry with PULMONARY LAB, [facility]- Pulmonary Function Sv ([facility])    [address]  [place] [address]   [phone]            Mar 12, 2018  9:40 AM CDT   Established Patient Visit with Dale Patel MD   [facility] ([facility])    [address] [facility] [place] [address]   [phone]              Follow-up Information     Schedule an appointment as soon as possible for a  "visit with Christopher Gutierrez Sr, MD.    Specialty:  Orthopedic Surgery    Why:  As needed, If symptoms worsen, For suture removal, For wound re-check    Contact information:    [address] [place] [address]  [phone]          Follow up with [facility].    Specialty:  SNF Agency    Why:  SNF        Discharge Instructions     Future Orders    Diet Adult Regular     Questions:    Total calories:      Fat restriction, if any:      Protein restriction, if any:      Na restriction, if any:      Fluid restriction:      Additional restrictions:      Full code     Place sequential compression device     Place CHANNING hose     Skin assessment every shift      Vital signs per facility protocol     Weight bearing status:     Scheduling Instructions:    Walk assisted ventilation full weight-bear left lower extremity        Primary Diagnosis     Your primary diagnosis was:  Arthritis Of Left Hip      Admission Information     Date & Time Provider Department CSN    3/29/2017  9:00 AM Christopher Gutierrez Sr., MD [facility] -  [id number]      Care Providers     Provider Role Specialty Primary office phone    Christopher Gutierrez Sr., MD Attending Provider Orthopedic Surgery [phone]    Christopher Gutierrez Sr., MD Surgeon  Orthopedic Surgery [phone]      Your Vitals Were     BP Pulse Temp Resp Height Weight    134/68 (BP Location: Right arm, Patient Position: Lying, BP Method: Automatic) 62 98.2 °F (36.8 °C) (Oral) 18 5' 4" (1.626 m) 99.5 kg (219 lb 5.7 oz)    SpO2 BMI             96% 37.65 kg/m2         Recent Lab Values        10/18/2011 10/2/2012 2/1/2013 7/30/2013 10/10/2014 5/5/2016 12/1/2016 3/29/2017      7:54 AM  9:04 AM  8:09 AM  8:25 AM  9:25 AM 10:45 AM 11:07 AM  3:20 PM    A1C 6.3 (H) 5.8 5.8 5.9 6.0 6.2 5.8 6.1    Comment for A1C at 11:07 AM on 12/1/2016:  According to ADA guidelines, hemoglobin A1C <7.0% represents  optimal control in non-pregnant diabetic patients.  " Different  metrics may apply to specific populations.   Standards of Medical Care in Diabetes - 2016.  For the purpose of screening for the presence of diabetes:  <5.7%     Consistent with the absence of diabetes  5.7-6.4%  Consistent with increasing risk for diabetes   (prediabetes)  >or=6.5%  Consistent with diabetes  Currently no consensus exists for use of hemoglobin A1C  for diagnosis of diabetes for children.      Comment for A1C at  3:20 PM on 3/29/2017:  According to ADA guidelines, hemoglobin A1C <7.0% represents  optimal control in non-pregnant diabetic patients.  Different  metrics may apply to specific populations.   Standards of Medical Care in Diabetes - 2016.  For the purpose of screening for the presence of diabetes:  <5.7%     Consistent with the absence of diabetes  5.7-6.4%  Consistent with increasing risk for diabetes   (prediabetes)  >or=6.5%  Consistent with diabetes  Currently no consensus exists for use of hemoglobin A1C  for diagnosis of diabetes for children.        Allergies as of 4/4/2017     No Known Allergies      Advance Directives     An advance directive is a document which, in the event you are no longer able to make decisions for yourself, tells your healthcare team what kind of treatment you do or do not want to receive, or who you would like to make those decisions for you.  If you do not currently have an advance directive, [facility] encourages you to create one.  For more information call:  [phone] ([phone]), [phone] ([phone]),  or log on to [email or web address].        Smoking Cessation     If you would like to quit smoking:   You may be eligible for free services if you are a [place] resident and started smoking cigarettes before September 1, 1988.  Call the Smoking Cessation Trust (SCT) toll free at (663) 309-7612 or (315) 676-6705.   Call 1-800-QUIT-NOW if you do not meet the above criteria.   Contact us via email: [email or web address]   View  our website for more information: [email or web address]        Language Assistance Services     ATTENTION: Language assistance services are available, free of charge. Please call [phone].      ATENCIÓN: Si sepideh simon, tiene a rinaldi disposición servicios gratuitos de asistencia lingüística. Llame al [phone].     CHÚ Ý: N?u b?n nói Ti?ng Vi?t, có các d?ch v? h? tr? ngôn ng? mi?n phí dành cho b?n. G?i s? [phone].        Diabetes Discharge Instructions                                    [facility] complies with applicable Federal civil rights laws and does not discriminate on the basis of race, color, national origin, age, disability, or sex.

## 2017-03-29 NOTE — PROGRESS NOTES
CC:This is a 81-year-old female that complains of severe Left hip pain.     HPI:The patient has had a fracture of the left hip in the past. She underwent an ORIF of that hip. The patient states that the pain is rated a 10 out of 10.     PMH:         Past Medical History   Diagnosis Date    Atypical chest pain 8/26/2013    CAD (coronary artery disease)      CAD in native artery 10/5/2015    Carotid artery disease without cerebral infarction 8/26/2013    Colon cancer         resection and chemo.    COPD (chronic obstructive pulmonary disease)      Depression      Diabetes mellitus type II      Diaphragmatic hernia without obstruction and without gangrene 8/13/2015    Emphysema of lung      Encounter for blood transfusion      Gallstone pancreatitis      History of PTCA 10/5/2015    Hypertension      Lymphocytic colitis      Meningioma      OA (osteoarthritis)      Obesity 8/26/2013         PSH:          Past Surgical History   Procedure Laterality Date    Shoulder surgery        Back surgery        Hip surgery           x 3 in one year.    Parathyroidectomy        Colectomy        Cholecystectomy        Cataract extraction        Abdominal surgery        Eye surgery        Bilateral knee surgery        4 hip surgeries        Coronary angioplasty             Family Hx:         Family History   Problem Relation Age of Onset    Hypertension Mother      Diabetes Mother      Heart failure Mother      Diabetes Maternal Aunt      Hypertension Maternal Aunt      Heart failure Maternal Aunt      Diabetes Maternal Aunt      Hypertension Maternal Aunt      Heart failure Maternal Aunt      Diabetes Maternal Aunt      Hypertension Maternal Aunt      Heart failure Maternal Aunt      Diabetes Maternal Aunt      Hypertension Maternal Aunt      Heart failure Maternal Aunt      Heart disease Father           Allergy:  Review of patient's allergies indicates:  No Known  Allergies     Medication:   Current Outpatient Prescriptions:    albuterol-ipratropium 2.5mg-0.5mg/3mL (DUO-NEB) 0.5 mg-3 mg(2.5 mg base)/3 mL nebulizer solution, Take 3 mLs by nebulization every 6 (six) hours., Disp: 120 vial, Rfl: 12   amlodipine (NORVASC) 5 MG tablet, TAKE 1 TABLET TWICE DAILY, Disp: 180 tablet, Rfl: 3   aspirin (ECOTRIN) 81 MG EC tablet, Take 81 mg by mouth once daily., Disp: , Rfl:    atenolol (TENORMIN) 25 MG tablet, Take 1 tablet (25 mg total) by mouth once daily., Disp: 90 tablet, Rfl: 3   benazepril (LOTENSIN) 20 MG tablet, Take 1 tablet (20 mg total) by mouth 2 (two) times daily., Disp: 180 tablet, Rfl: 3   budesonide (PULMICORT) 0.5 mg/2 mL nebulizer solution, Take 2 mLs (0.5 mg total) by nebulization 2 (two) times daily. Wash out mouth after using., Disp: 120 mL, Rfl: 12   clopidogrel (PLAVIX) 75 mg tablet, Take 1 tablet (75 mg total) by mouth once daily., Disp: 90 tablet, Rfl: 3   fluticasone (FLONASE) 50 mcg/actuation nasal spray, 2 sprays by Each Nare route once daily., Disp: 1 Bottle, Rfl: 11   gabapentin (NEURONTIN) 300 MG capsule, Take 1 capsule (300 mg total) by mouth 3 (three) times daily., Disp: 270 capsule, Rfl: 3   hydrocodone-acetaminophen 10-325mg (NORCO)  mg Tab, 1 tablet in the morning and 1 at night, 1/2 tablet every 4 hours., Disp: 90 tablet, Rfl: 0   isosorbide mononitrate (IMDUR) 60 MG 24 hr tablet, Take 1 tablet (60 mg total) by mouth 2 (two) times daily., Disp: 180 tablet, Rfl: 3   metformin (GLUCOPHAGE) 500 MG tablet, Take 1 tablet (500 mg total) by mouth 2 (two) times daily with meals., Disp: 180 tablet, Rfl: 3   nebulizer accessories Kit, Use with nebulizer, Disp: 1 kit, Rfl: prn   nitroGLYCERIN (NITROSTAT) 0.4 MG SL tablet, Place 1 tablet (0.4 mg total) under the tongue every 5 (five) minutes as needed for Chest pain., Disp: 60 tablet, Rfl: 12   pravastatin (PRAVACHOL) 20 MG tablet, Take 1 tablet (20 mg total) by mouth once daily., Disp: 90  "tablet, Rfl: 4   sertraline (ZOLOFT) 100 MG tablet, Take 1 tablet (100 mg total) by mouth every evening., Disp: 90 tablet, Rfl: 3     Social History:    Social History    Social History            Social History    Marital status:        Spouse name: N/A    Number of children: N/A    Years of education: N/A          Occupational History    Not on file.            Social History Main Topics    Smoking status: Former Smoker       Packs/day: 2.50       Years: 50.00       Types: Cigarettes       Quit date: 8/7/1987    Smokeless tobacco: Never Used    Alcohol use No    Drug use: No    Sexual activity: No           Other Topics Concern    Not on file      Social History Narrative            Vitals:        Visit Vitals    BP (!) 164/80    Pulse 72    Ht 5' 4" (1.626 m)    Wt 99.6 kg (219 lb 9.3 oz)    BMI 37.69 kg/m2         ROS:  GENERAL: No fever, chills, fatigability or weight loss.  SKIN: No rashes, itching or changes in color or texture of skin.  HEAD: No headaches or recent head trauma.  EYES: Visual acuity fine. No photophobia, ocular pain or diplopia.  EARS: Denies ear pain, discharge or vertigo.  NOSE: No loss of smell, no epistaxis or postnasal drip.  MOUTH & THROAT: No hoarseness or change in voice. No excessive gum bleeding.  NODES: Denies swollen glands.  CHEST: Denies COTTER, cyanosis, wheezing, cough and sputum production.  CARDIOVASCULAR: Denies chest pain, PND, orthopnea or reduced exercise tolerance.  ABDOMEN: Appetite fine. No weight loss. Denies diarrhea, abdominal pain, hematemesis or blood in stool.  URINARY: No flank pain, dysuria or hematuria.  PERIPHERAL VASCULAR: No claudication or cyanosis.  NEUROLOGIC: No history of seizures, paralysis, alteration of gait or coordination.  MUSCULOSKELETAL: See HPI     PE:  APPEARANCE: Well nourished, well developed, in no acute distress.   HEAD: Normocephalic, atraumatic.  EYES: PERRL. EOMI.   EARS: TM's intact. Light reflex normal. No " retraction or perforation.   NOSE: Mucosa pink. Airway clear.  MOUTH & THROAT: No tonsillar enlargement. No pharyngeal erythema or exudate. No stridor.  NECK: Supple.   NODES: No cervical, axillary or inguinal lymph node enlargement.  CHEST: Lungs clear to auscultation.  CARDIOVASCULAR: Normal S1, S2. No rubs, murmurs or gallops.  ABDOMEN: Bowel sounds normal. Not distended. Soft. No tenderness or masses.  NEUROLOGIC: Cranial Nerves: II-XII grossly intact, also see MUSCULOSKELETAL  MUSCULOSKELETAL:      Bilateral Hips- Severely limited range of motion,     Hip ( Left) Degrees Normal  Flexion Decreased 0-135  Extension Decreased 0-30  Abduction Decreased 0-50  Adduction Decreased 0-30  Medial Rotation Decrease 0-40   Lateral Rotation Decrease 0-60  Crepitus   Greater Trochanteric tenderness  +1-2/4 DP and PT artery pulses   Sensation intact all dermatomes  +4-5/5 motor strength throughout  +2/4 DTR-PT,AT  Negative long tract signs- neg Babinski, neg Clonus      Assessment:     Diagnosis:  1.left hip arthritis With hardware failure          Diagnostic Studies  MRI-No  X-Ray-No  EMG/NCV-No  Arthrogram-No  Bone Scan-No  CT Scan-No  Doppler-No  ESR-No  CRP-No  CBC with Diff-No  Rheumatoid/Arthritis Panel-No        Plan:      1. PT-yes   2.OT-no   3.NSAID-yes   4. Narcotics-yes   5. Wound care-No   6. Rest-yes   7. Surgery-yes , Left hip hardware removal and anterior hip replacement.   8. CHANNING Hose-yes   9. Anticoagulation therapy-no   10. Elevation-no   11. Crutches-no   12. Walker-no   13. Cane no   14. Referral-no   15.Injection-no   16. Splint / Cast / Cast Shoe-No   17. RICE-none  18. Follow up- weeks

## 2017-03-29 NOTE — TRANSFER OF CARE
"Anesthesia Transfer of Care Note    Patient: Maldonado Deleon    Procedure(s) Performed: Procedure(s) (LRB):  ARTHROPLASTY-HIP (ANTERIOR APPROACH)-with excision of synovial chondromatosis (Left)  REMOVAL-HARDWARE-LEFT HIP (Left)  BURSECTOMY-HIP (Left)    Patient location: PACU    Anesthesia Type: general    Transport from OR: Transported from OR on room air with adequate spontaneous ventilation    Post pain: adequate analgesia    Post assessment: no apparent anesthetic complications and tolerated procedure well    Post vital signs: stable    Level of consciousness: awake, alert and oriented    Nausea/Vomiting: no nausea/vomiting    Complications: none          Last vitals:   Visit Vitals    BP (!) 182/104    Pulse 87    Temp 36.8 °C (98.2 °F) (Temporal)    Resp 18    Ht 5' 4" (1.626 m)    Wt 99.5 kg (219 lb 5.7 oz)    SpO2 (!) 93%    Breastfeeding No    BMI 37.65 kg/m2     "

## 2017-03-29 NOTE — ANESTHESIA PREPROCEDURE EVALUATION
"                                                                                                             03/29/2017  Maldonado Deleon is a 81 y.o., female.    [facility] Anesthesia Evaluation    I have reviewed the Patient Summary Reports.    I have reviewed the Nursing Notes.   I have reviewed the Medications.     Review of Systems  Anesthesia Hx:  No problems with previous Anesthesia  Denies Family Hx of Anesthesia complications.   Denies Personal Hx of Anesthesia complications.   Social:  Former Smoker, No Alcohol Use    Hematology/Oncology:  Hematology Normal      Oncology Comments: Colon ca    Cardiovascular:   Hypertension CAD  CABG/stent   1 - Moderate left atrial enlargement.     2 - Concentric hypertrophy.     3 - Normal left ventricular systolic function (EF 60-65%).     4 - Left ventricular diastolic dysfunction.     5 - Normal right ventricular systolic function .     6 - The estimated PA systolic pressure is greater than 34 mmHg.     7 - Trivial to mild tricuspid regurgitation.   Carotid study: R 20-39%, L 50-59%            This document has been electronically    SIGNED BY: Hayes Chavez MD On: 01/13/2017 08:49    Stent x1 placed "a couple years ago" and pt has another blockage that "is being watched"   Pulmonary:   COPD, moderate Denies Asthma. Shortness of breath with min exertion  Wears o2 for adls and at night   Renal/:  Renal/ Normal     Hepatic/GI:   GERD, poorly controlled Denies Liver Disease. Denies Hepatitis.    Musculoskeletal:   Arthritis     Neurological:   Neuromuscular Disease, Headaches    Endocrine:   Diabetes, type 2 Denies Hypothyroidism. Denies Hyperthyroidism.    Psych:   Psychiatric History depression          Physical Exam  General:  Obesity    Airway/Jaw/Neck:  Airway Findings: Mouth Opening: Normal Tongue: Normal  General Airway Assessment: Adult  Mallampati: II      Dental:  Dental Findings: In tact   Chest/Lungs:  Chest/Lungs Findings: Clear to auscultation, Normal " Respiratory Rate     Heart/Vascular:  Heart Findings: Rate: Normal  Rhythm: Regular Rhythm  Sounds: Normal             Anesthesia Plan  Type of Anesthesia, risks & benefits discussed:  Anesthesia Type:  general  Patient's Preference:   Intra-op Monitoring Plan: standard ASA monitors  Intra-op Monitoring Plan Comments:   Post Op Pain Control Plan:   Post Op Pain Control Plan Comments:   Induction:   IV  Beta Blocker:  Patient is on a Beta-Blocker and has received one dose within the past 24 hours (No further documentation required).       Informed Consent: Patient understands risks and agrees with Anesthesia plan.  Questions answered. Anesthesia consent signed with patient.  ASA Score: 3     Day of Surgery Review of History & Physical: I have interviewed and examined the patient. I have reviewed the patient's H&P dated:  There are no significant changes.  H&P update referred to the surgeon.     Anesthesia Plan Notes: Last dose of plavix was 6 days ago.         Ready For Surgery From Anesthesia Perspective.

## 2017-03-29 NOTE — PLAN OF CARE
Patient prepared for surgery. Family at bedside. Mupirocin ointment given to OR staff to swab nares prior to surgery.

## 2017-03-29 NOTE — OP NOTE
DATE OF PROCEDURE: 03/29/2017    PREOPERATIVE DIAGNOSES:Left hip osteoarthritis and hardware failure .  Obesity    POSTOPERATIVE DIAGNOSES: Left hip osteoarthritis, left hip synovitis, left hip exostosis, hardware failure,                                                          Obesity, synovial chondromatosis    OPERATIVE PROCEDURE: Left hip anterior total hip replacement , capsulotomy, synovectomy and exostosis excision, hardware removal of the left hip ×3, synovial chondromatosis excision    SURGEON: Christopher Gutierrez Sr., M.D.    ASSISTANT:ROMAN Mccormick    COMPLICATIONS: None    SPECIMEN: Left femoral head    Estimated blood loss: 150 cc  .  ANESTHESIA: General anesthesia with intubation.    INDICATION FOR SURGERY: The patient failed conservative treatment with weight    loss, physical therapy exercises, anti-inflammatory narcotic medication and    walking aid. The patient had a significant fall risk and the pain in the left  hip was progressing and interfering with her activities of daily living. The    patient surgical procedure was 100% greater degree of difficulty than a normal    total hip due to her hip contracture and obesity . The procedure required the    constant assistance of 3 individuals assisting in surgery at all times    throughout the procedure. The patient's estimated blood loss was 150 mL.    OPERATIVE PROCEDURE IN DETAIL: The patient was brought to the Operating Room    after appropriate consent, placed under general anesthesia with intubation. The  patient was placed in a supine position on the [place] table. The Left hip prepped  with alcohol, chlorhexidine and sterilely draped. The time-out was performed.    The patient did receive preoperative IV antibiotics. An incision made  3 cm lateral to   And 1 cm distal to the anterior superior iliac spine.  The incision extended distally in line with the anterior portion of the tensor fascia grisel. Dissection was    carried through subcutaneous  tissue down to tensor fascia grisel. Fascia was    Incised overlying the tensor fascia grisel. The interval between the tensor fascia grisel and the    Rectus femoris was developed. The cautery was used for hemostasis.    The vastus lateralis fascia was incised and under fluoroscopic evaluation the wound was identified and removed.  The fascia was repaired using interrupted #1 Vicryl sutures.  A second screw was identified using fluoroscopy and the incision made through the vastus lateralis.  The second screw was identified and removed.  The vastus lateralis was repaired using interrupted #1 Vicryl sutures.  The last screw was identified on fluoroscopy and the incision made through the vastus lateralis fascia and the screw removed.  The fascia overlying the vastus lateralis repaired using interrupted #1 Vicryl sutures.The capsule was identified and the Cobra was placed over the anterior neck outside of the capsule as well as the posterior neck of the outer capsule of the hip. The capsulotomy    performed. The patient noted to have osteophyte at the periphery of the    acetabulum as well as greater trochanter. The rongeur used to remove and excise  the exostosis. The patient noted to have a synovitis of the hip joint.The femoral neck cut made after  the femoral neck cut guide placed. The femoral head was removed.    The alignment of the femoral head neck lesser and greater trochanter were isolated under    Fluoroscopy fluoroscopy. The relationship of the lesser trochanter in relation to the issue tuberosity with good obturator views was noted. The hips were kept in neutral rotation on the [place] table.  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 The patient has significant arthritis. The reaming performed up to a size 49 . The trial size  50  mm cup was placed noted to  fit quite well. Drill holes made of the medial    aspect of the acetabulum and the bleeding bone exposed. The bone graft placed    medially. The size 50 mm cup placed retaining the anatomical angle of inclination    as well as the version. The trial polyethylene locked in place. The reaming    and rasping performed on the femoral side using a size 3 reamer and rasp. The    Size 3 rasp femoral component left in place. The +5 neck,  32 mm head placed    on the femoral side. The hip reduced, brought through range of motion, noted to  track quite well. All trial components were removed. Pulse lavaged thoroughly  performed. The permanent polyethylene locked in place. The femoral stem size    3 placed with the appropriate femoral version, a size  32 mm head with a +5  neck was placed.  The hip reduced, brought through range of motion, noted    to track quite well. No significant pistoning noted. Intraoperative fluoroscopy showed good alignment of the  Prosthesis as well as good leg length symmetry.    The pulse lavage performed an intraarticular drain placed.  The subcutaneous tissue opposed with #1 Vicryl sutures, a    subcuticular suture placed. The overlying skin reinforced with Dermabond.     Sterile gauze applied. The sponge tape    applied. A   deep drain was in place prior to closing of the    incision.Intraoperative x-ray    verified the placement of the hardware, which was noted to be satisfactory. The  patient tolerated the procedure well and left the Operating Room in good    Condition.  Christopher Gutierrez M.D.  Christopher Gutierrez M.D.

## 2017-03-29 NOTE — ANESTHESIA POSTPROCEDURE EVALUATION
"Anesthesia Post Evaluation    Patient: Maldonado Deleon    Procedure(s) Performed: Procedure(s) (LRB):  ARTHROPLASTY-HIP (ANTERIOR APPROACH)-with excision of synovial chondromatosis (Left)  REMOVAL-HARDWARE-LEFT HIP (Left)  BURSECTOMY-HIP (Left)    Final Anesthesia Type: general  Patient location during evaluation: PACU  Patient participation: Yes- Able to Participate  Level of consciousness: awake and alert  Post-procedure vital signs: reviewed and stable  Pain management: adequate  Airway patency: patent  PONV status at discharge: No PONV  Anesthetic complications: no      Cardiovascular status: blood pressure returned to baseline  Respiratory status: unassisted  Hydration status: euvolemic  Follow-up not needed.        Visit Vitals    BP (!) 141/92    Pulse 62    Temp 36.8 °C (98.2 °F) (Temporal)    Resp 19    Ht 5' 4" (1.626 m)    Wt 99.5 kg (219 lb 5.7 oz)    SpO2 (!) 93%    Breastfeeding No    BMI 37.65 kg/m2       Pain/Tigre Score: Pain Assessment Performed: Yes (3/29/2017 10:00 AM)  Presence of Pain: non-verbal indicators absent (3/29/2017  3:45 PM)  Pain Rating Prior to Med Admin: 7 (3/29/2017  3:15 PM)  Tigre Score: 8 (3/29/2017  3:45 PM)      "

## 2017-03-30 LAB
HCT VFR BLD AUTO: 27.5 %
HGB BLD-MCNC: 8.8 G/DL
POCT GLUCOSE: 122 MG/DL (ref 70–110)
POCT GLUCOSE: 169 MG/DL (ref 70–110)
POCT GLUCOSE: 230 MG/DL (ref 70–110)
POCT GLUCOSE: 246 MG/DL (ref 70–110)

## 2017-03-30 PROCEDURE — 63600175 PHARM REV CODE 636 W HCPCS: Performed by: ORTHOPAEDIC SURGERY

## 2017-03-30 PROCEDURE — 97530 THERAPEUTIC ACTIVITIES: CPT

## 2017-03-30 PROCEDURE — G8979 MOBILITY GOAL STATUS: HCPCS | Mod: CK

## 2017-03-30 PROCEDURE — 25000003 PHARM REV CODE 250: Performed by: NURSE PRACTITIONER

## 2017-03-30 PROCEDURE — 25000242 PHARM REV CODE 250 ALT 637 W/ HCPCS: Performed by: NURSE PRACTITIONER

## 2017-03-30 PROCEDURE — 99900035 HC TECH TIME PER 15 MIN (STAT)

## 2017-03-30 PROCEDURE — 97167 OT EVAL HIGH COMPLEX 60 MIN: CPT

## 2017-03-30 PROCEDURE — 27000221 HC OXYGEN, UP TO 24 HOURS

## 2017-03-30 PROCEDURE — G8978 MOBILITY CURRENT STATUS: HCPCS | Mod: CL

## 2017-03-30 PROCEDURE — G8987 SELF CARE CURRENT STATUS: HCPCS | Mod: CM

## 2017-03-30 PROCEDURE — 85018 HEMOGLOBIN: CPT

## 2017-03-30 PROCEDURE — G8988 SELF CARE GOAL STATUS: HCPCS | Mod: CK

## 2017-03-30 PROCEDURE — 96372 THER/PROPH/DIAG INJ SC/IM: CPT

## 2017-03-30 PROCEDURE — 36415 COLL VENOUS BLD VENIPUNCTURE: CPT

## 2017-03-30 PROCEDURE — 94640 AIRWAY INHALATION TREATMENT: CPT

## 2017-03-30 PROCEDURE — 25000003 PHARM REV CODE 250: Performed by: ORTHOPAEDIC SURGERY

## 2017-03-30 PROCEDURE — 11000001 HC ACUTE MED/SURG PRIVATE ROOM

## 2017-03-30 PROCEDURE — 85014 HEMATOCRIT: CPT

## 2017-03-30 PROCEDURE — 97162 PT EVAL MOD COMPLEX 30 MIN: CPT

## 2017-03-30 PROCEDURE — 63600175 PHARM REV CODE 636 W HCPCS: Performed by: NURSE PRACTITIONER

## 2017-03-30 RX ADMIN — GABAPENTIN 300 MG: 300 CAPSULE ORAL at 09:03

## 2017-03-30 RX ADMIN — MORPHINE SULFATE 2 MG: 2 INJECTION, SOLUTION INTRAMUSCULAR; INTRAVENOUS at 01:03

## 2017-03-30 RX ADMIN — PRAVASTATIN SODIUM 20 MG: 20 TABLET ORAL at 09:03

## 2017-03-30 RX ADMIN — CEFAZOLIN SODIUM 2 G: 2 SOLUTION INTRAVENOUS at 07:03

## 2017-03-30 RX ADMIN — INSULIN ASPART 4 UNITS: 100 INJECTION, SOLUTION INTRAVENOUS; SUBCUTANEOUS at 07:03

## 2017-03-30 RX ADMIN — IPRATROPIUM BROMIDE AND ALBUTEROL SULFATE 3 ML: .5; 3 SOLUTION RESPIRATORY (INHALATION) at 11:03

## 2017-03-30 RX ADMIN — CLOPIDOGREL BISULFATE 75 MG: 75 TABLET ORAL at 08:03

## 2017-03-30 RX ADMIN — OXYCODONE HYDROCHLORIDE 10 MG: 5 TABLET ORAL at 05:03

## 2017-03-30 RX ADMIN — ARFORMOTEROL TARTRATE 15 MCG: 15 SOLUTION RESPIRATORY (INHALATION) at 07:03

## 2017-03-30 RX ADMIN — SERTRALINE HYDROCHLORIDE 100 MG: 50 TABLET, FILM COATED ORAL at 09:03

## 2017-03-30 RX ADMIN — IPRATROPIUM BROMIDE 2 SPRAY: 42 SPRAY NASAL at 05:03

## 2017-03-30 RX ADMIN — ARFORMOTEROL TARTRATE 15 MCG: 15 SOLUTION RESPIRATORY (INHALATION) at 08:03

## 2017-03-30 RX ADMIN — GABAPENTIN 300 MG: 300 CAPSULE ORAL at 05:03

## 2017-03-30 RX ADMIN — APIXABAN 2.5 MG: 2.5 TABLET, FILM COATED ORAL at 09:03

## 2017-03-30 RX ADMIN — OXYCODONE HYDROCHLORIDE 10 MG: 5 TABLET ORAL at 12:03

## 2017-03-30 RX ADMIN — MORPHINE SULFATE 2 MG: 2 INJECTION, SOLUTION INTRAMUSCULAR; INTRAVENOUS at 06:03

## 2017-03-30 RX ADMIN — BENAZEPRIL HYDROCHLORIDE 20 MG: 20 TABLET, FILM COATED ORAL at 08:03

## 2017-03-30 RX ADMIN — GABAPENTIN 300 MG: 300 CAPSULE ORAL at 01:03

## 2017-03-30 RX ADMIN — IPRATROPIUM BROMIDE 2 SPRAY: 42 SPRAY NASAL at 12:03

## 2017-03-30 RX ADMIN — MORPHINE SULFATE 2 MG: 2 INJECTION, SOLUTION INTRAMUSCULAR; INTRAVENOUS at 05:03

## 2017-03-30 RX ADMIN — OXYCODONE HYDROCHLORIDE 10 MG: 5 TABLET ORAL at 03:03

## 2017-03-30 RX ADMIN — IPRATROPIUM BROMIDE 2 SPRAY: 42 SPRAY NASAL at 07:03

## 2017-03-30 RX ADMIN — ISOSORBIDE MONONITRATE 60 MG: 60 TABLET, EXTENDED RELEASE ORAL at 08:03

## 2017-03-30 RX ADMIN — CEFAZOLIN SODIUM 2 G: 2 SOLUTION INTRAVENOUS at 01:03

## 2017-03-30 RX ADMIN — OXYCODONE HYDROCHLORIDE 10 MG: 5 TABLET ORAL at 01:03

## 2017-03-30 RX ADMIN — INSULIN ASPART 2 UNITS: 100 INJECTION, SOLUTION INTRAVENOUS; SUBCUTANEOUS at 09:03

## 2017-03-30 NOTE — SUBJECTIVE & OBJECTIVE
"Interval History:complains of increased hip pain today. "not excited about physical therapy today" patient was encouraged. Hemoglobin stable.     Review of Systems   Constitutional: Negative for activity change, diaphoresis, fatigue and unexpected weight change.   HENT: Negative for congestion, ear pain and sore throat.    Eyes: Negative.    Respiratory: Negative for shortness of breath and wheezing.    Cardiovascular: Negative for chest pain and palpitations.   Gastrointestinal: Negative for abdominal pain, constipation, diarrhea and vomiting.   Endocrine: Negative.    Genitourinary: Negative for flank pain, hematuria and urgency.   Musculoskeletal: Negative for joint swelling and neck pain.        Left hip pain     Skin: Negative for pallor.   Neurological: Negative for seizures, syncope and light-headedness.   Hematological: Negative.    Psychiatric/Behavioral: Negative.      Objective:     Vital Signs (Most Recent):  Temp: 98.5 °F (36.9 °C) (03/30/17 0810)  Pulse: (!) 59 (03/30/17 0810)  Resp: 18 (03/30/17 0810)  BP: 102/78 (03/30/17 0810)  SpO2: 96 % (03/30/17 0810) Vital Signs (24h Range):  Temp:  [97.9 °F (36.6 °C)-98.7 °F (37.1 °C)] 98.5 °F (36.9 °C)  Pulse:  [58-87] 59  Resp:  [12-20] 18  SpO2:  [93 %-100 %] 96 %  BP: (102-224)/() 102/78     Weight: 99.5 kg (219 lb 5.7 oz)  Body mass index is 37.65 kg/(m^2).    Intake/Output Summary (Last 24 hours) at 03/30/17 1109  Last data filed at 03/30/17 0558   Gross per 24 hour   Intake          3164.17 ml   Output             1295 ml   Net          1869.17 ml      Physical Exam   Constitutional: She is oriented to person, place, and time. She appears well-developed and well-nourished.   HENT:   Head: Normocephalic and atraumatic.   Eyes: EOM are normal.   Neck: Normal range of motion. Neck supple.   Cardiovascular: Normal rate, regular rhythm and normal heart sounds.    No murmur heard.  Pulmonary/Chest: Effort normal and breath sounds normal. No respiratory " distress.   Abdominal: Soft. Bowel sounds are normal. She exhibits no distension. There is no tenderness.   Genitourinary:   Genitourinary Comments: Magaña catheter no longer present     Musculoskeletal: Normal range of motion. She exhibits no edema.   Neurological: She is alert and oriented to person, place, and time.   Skin: Skin is warm and dry.   Left hip dressing intact. Clean without dressing       Significant Labs:   CBC:   Recent Labs  Lab 03/29/17  1520 03/30/17  0748 03/30/17  0749   HGB  --   --  8.8*   HCT 35.0* 27.5*  --      CMP: No results for input(s): NA, K, CL, CO2, GLU, BUN, CREATININE, CALCIUM, PROT, ALBUMIN, BILITOT, ALKPHOS, AST, ALT, ANIONGAP, EGFRNONAA in the last 48 hours.    Invalid input(s): EST[place]    Significant Imaging:  Imaging Results         X-Ray Hip 2 or 3 views Left (Final result) Result time:  03/29/17 14:23:32    Final result by Wilber Verma MD (03/29/17 14:23:32)    Impression:         See above        Electronically signed by: WILBER VERMA MD  Date:     03/29/17  Time:    14:23     Narrative:    Exam: XR HIP 2 VIEW LEFT    Clinical History: Left hip arthroplasty.  Left hip osteoporosis..    Findings:      6 intraoperative C-arm radiographs were obtained for intraoperative control.  They show removal of the 3 femoral neck screws and interval placement of a left hip arthroplasty with good alignment.Total fluoroscopy time 35.8 seconds.            FL Greater Than 1 Hour (In process)

## 2017-03-30 NOTE — ASSESSMENT & PLAN NOTE
S/p left hip arthroplasty-per primary tean  Pain management  Consulted to social work for discharge planning  Monitor H/H per acute blood loss anemia  Anticoagulation per Primary team

## 2017-03-30 NOTE — NURSING
Patient was bladder scanned at. 625ml in bladder. Spoke with ANTONIO Suresh and she ordered an in and out. Patient tolerated well.

## 2017-03-30 NOTE — PT/OT/SLP EVAL
Physical Therapy  Evaluation    Maldonado Deleon   MRN: [id number]   Admitting Diagnosis: Arthritis of left hip    PT Received On: 03/30/17  PT Start Time: 1111     PT Stop Time: 1134    PT Total Time (min): 23 min       Billable Minutes:  Evaluation 13 and Therapeutic Activity 10    Diagnosis: Arthritis of left hip  P.T. DX: GT INSTABILITY     Past Medical History:   Diagnosis Date    Anticoagulant long-term use     Plavix    Atypical chest pain 8/26/2013    Back pain     CAD (coronary artery disease)     CAD in native artery 10/5/2015    Carotid artery disease without cerebral infarction 8/26/2013    Colon cancer     resection and chemo.    COPD (chronic obstructive pulmonary disease)     Depression     Diabetes mellitus type II     Diaphragmatic hernia without obstruction and without gangrene 8/13/2015    Emphysema of lung     Encounter for blood transfusion     Gallstone pancreatitis     History of PTCA 10/5/2015    Hypertension     Lymphocytic colitis     Meningioma     OA (osteoarthritis)     Obesity 8/26/2013    Oxygen dependent     q hs and prn      Past Surgical History:   Procedure Laterality Date    ABDOMINAL SURGERY      BACK SURGERY      x 2    CATARACT EXTRACTION      CHOLECYSTECTOMY      COLECTOMY      CORONARY ANGIOPLASTY      EYE SURGERY      JOINT REPLACEMENT Right     hip replacement x 4    PARATHYROIDECTOMY      SHOULDER SURGERY Right     TOTAL KNEE ARTHROPLASTY Bilateral        Referring physician: KALPESH  Date referred to PT: 3/30/2017      General Precautions: Standard, fall  Orthopedic Precautions: LLE weight bearing as tolerated   Braces:              Patient History:  Lives With: child(kanwal), adult  Living Arrangements: house  Stair Railings at Home: none  Transportation Available: family or friend will provide  Living Environment Comment: PT WAS S FOR GT X APPROX 10 ' WITH RW AND HAS 24 HOUR S.  Equipment Currently Used at Home: walker, rolling, bedside commode,  shower chair  DME owned (not currently used): none    Previous Level of Function:  Ambulation Skills: needs device and assist  Transfer Skills: needs device and assist  ADL Skills: needs device and assist    Subjective:  Communicated with NURSE RIVERS AND EPIC CHART REVIEW  prior to session.   PT AGREED TO EVAL AND TX   Chief Complaint: PAIN   Patient goals: NONE STATED     Pain Ratin/10   Location - Side: Left     Location: hip     Pain Rating Post-Intervention: 7/10    Objective:   Patient found with: oxygen     Cognitive Exam:  Oriented to: Person, Place, Time and Situation    Follows Commands/attention: Follows multistep  commands  Communication: clear/fluent  Safety awareness/insight to disability: intact    Physical Exam:  Postural examination/scapula alignment: Rounded shoulder and Head forward    Edema: L LE      Sensation:   Impaired  light/touch PT REPORTED B LE SENSATION IMPAIRED    Lower Extremity Range of Motion:  Right Lower Extremity: WFL  Left Lower Extremity: LIMITED    Lower Extremity Strength:  Right Lower Extremity: WFL  Left Lower Extremity: LIMITED    Functional Mobility:  Bed Mobility:  Rolling/Turning Right: Minimum assistance  Supine to Sit: Minimum Assistance    Transfers:  Sit <> Stand Assistance: Moderate Assistance  Sit <> Stand Assistive Device: Rolling Walker  Bed <> Chair Technique: Stand Pivot  Bed <> Chair Assistance: Moderate Assistance  Bed <> Chair Assistive Device: Rolling Walker    Gait:   Gait Distance: UNABLE D/T PAIN    Balance:   Static Sit: FAIR+: Able to take MINIMAL challenges from all directions  Dynamic Sit: FAIR+: Maintains balance through MINIMAL excursions of active trunk motion  Static Stand: POOR: Needs MODERATE assist to maintain  Dynamic stand: POOR: N/A    Therapeutic Activities and Exercises:  PT STOOD WITH RW AND T/F TO CHAIR WITH MOD A WITH RW AND VERBAL CUES FOR SAFETY WITH RW USE. PT EDUCATED ON ROLE OF P.T. AND LEFT WITH CALL FORTUNATO CHAMPAGNE    AM-PAC 6  CLICK MOBILITY  How much help from another person does this patient currently need?   1 = Unable, Total/Dependent Assistance  2 = A lot, Maximum/Moderate Assistance  3 = A little, Minimum/Contact Guard/Supervision  4 = None, Modified [place]/Independent          AM-PAC Raw Score CMS G-Code Modifier Level of Impairment Assistance   6 % Total / Unable   7 - 9 CM 80 - 100% Maximal Assist   10 - 14 CL 60 - 80% Moderate Assist   15 - 19 CK 40 - 60% Moderate Assist   20 - 22 CJ 20 - 40% Minimal Assist   23 CI 1-20% SBA / CGA   24 CH 0% Independent/ Mod I     Patient left up in chair with call button in reach.    Assessment:   Maldonado Deleon is a 81 y.o. female with a medical diagnosis of Arthritis of left hip and presents S/P L THR AND WILL BENEFIT FROM P.T. TO ADDRESS IMPAIRMENTS LISTED. .    Rehab identified problem list/impairments: Rehab identified problem list/impairments: weakness, impaired endurance, impaired sensation, pain, edema, decreased safety awareness, gait instability, impaired balance, impaired functional mobilty, decreased lower extremity function, decreased ROM    Rehab potential is good.    Activity tolerance: Fair    Discharge recommendations: Discharge Facility/Level Of Care Needs: nursing facility, skilled     Barriers to discharge:      Equipment recommendations: Equipment Needed After Discharge: none     GOALS:   Physical Therapy Goals        Problem: Physical Therapy Goal    Goal Priority Disciplines Outcome Goal Variances Interventions   Physical Therapy Goal     PT/OT, PT      Description:  PT WILL BE SEEN FOR P.T. FOR A MIN OF 5 OUT OF 7 DAYS A WEEK  LT17  1. PT WILL COMPLETE BED MOBILITY WITH SBA.  2. PT WILL T/F TO CHAIR WITH RW AND MIN A  3. PT WILL GT TRAIN X 100' WITH RW AND MIN A  4. PT WILL COMPLETE B LE TE X 20 REPS FOR ROM AND STRENGTHENING.               PLAN:    Patient to be seen   to address the above listed problems via gait training, therapeutic activities,  therapeutic exercises  Plan of Care expires:    Plan of Care reviewed with: patient    Functional Assessment Tool Used: BOSTON ELMER PAC  Score: CL  Functional Limitation: Mobility: Walking and moving around  Mobility: Walking and Moving Around Current Status (): CL  Mobility: Walking and Moving Around Goal Status (): SHAGUFTA Demarco, PT  03/30/2017

## 2017-03-30 NOTE — NURSING
"Patient assessed for diabetes educational needs following chart review  She reports was diagnosed over 15 years ago  She has a home glucose meter but does not check regularly as she states her diabetes is "good"  She is consistent with taking her diabetes oral agent  She does not identify any diabetes educational needs at this time  "

## 2017-03-30 NOTE — SUBJECTIVE & OBJECTIVE
Past Medical History:   Diagnosis Date    Anticoagulant long-term use     Plavix    Atypical chest pain 8/26/2013    Back pain     CAD (coronary artery disease)     CAD in native artery 10/5/2015    Carotid artery disease without cerebral infarction 8/26/2013    Colon cancer     resection and chemo.    COPD (chronic obstructive pulmonary disease)     Depression     Diabetes mellitus type II     Diaphragmatic hernia without obstruction and without gangrene 8/13/2015    Emphysema of lung     Encounter for blood transfusion     Gallstone pancreatitis     History of PTCA 10/5/2015    Hypertension     Lymphocytic colitis     Meningioma     OA (osteoarthritis)     Obesity 8/26/2013    Oxygen dependent     q hs and prn       Past Surgical History:   Procedure Laterality Date    ABDOMINAL SURGERY      BACK SURGERY      x 2    CATARACT EXTRACTION      CHOLECYSTECTOMY      COLECTOMY      CORONARY ANGIOPLASTY      EYE SURGERY      JOINT REPLACEMENT Right     hip replacement x 4    PARATHYROIDECTOMY      SHOULDER SURGERY Right     TOTAL KNEE ARTHROPLASTY Bilateral        Review of patient's allergies indicates:  No Known Allergies    No current facility-administered medications on file prior to encounter.      Current Outpatient Prescriptions on File Prior to Encounter   Medication Sig    amlodipine (NORVASC) 5 MG tablet TAKE 1 TABLET TWICE DAILY (Patient taking differently: Take 5 mg by mouth 2 (two) times daily. TAKE 1 TABLET TWICE DAILY)    atenolol (TENORMIN) 25 MG tablet Take 1 tablet (25 mg total) by mouth once daily. (Patient taking differently: Take 25 mg by mouth every evening. )    benazepril (LOTENSIN) 20 MG tablet Take 1 tablet (20 mg total) by mouth 2 (two) times daily.    fluticasone (FLONASE) 50 mcg/actuation nasal spray 2 sprays by Each Nare route once daily.    gabapentin (NEURONTIN) 300 MG capsule Take 1 capsule (300 mg total) by mouth 3 (three) times daily.    isosorbide  mononitrate (IMDUR) 60 MG 24 hr tablet Take 1 tablet (60 mg total) by mouth 2 (two) times daily.    [] metformin (GLUCOPHAGE) 500 MG tablet Take 1 tablet (500 mg total) by mouth 2 (two) times daily with meals.    pravastatin (PRAVACHOL) 20 MG tablet Take 1 tablet (20 mg total) by mouth once daily. (Patient taking differently: Take 20 mg by mouth every evening. )    sertraline (ZOLOFT) 100 MG tablet Take 1 tablet (100 mg total) by mouth every evening.    [DISCONTINUED] aspirin (ECOTRIN) 81 MG EC tablet Take 81 mg by mouth once daily.    nebulizer accessories Kit Use with nebulizer    nitroGLYCERIN (NITROSTAT) 0.4 MG SL tablet Place 1 tablet (0.4 mg total) under the tongue every 5 (five) minutes as needed for Chest pain.     Family History     Problem Relation (Age of Onset)    Diabetes Mother, Maternal Aunt, Maternal Aunt, Maternal Aunt, Maternal Aunt    Heart disease Father    Heart failure Mother, Maternal Aunt, Maternal Aunt, Maternal Aunt, Maternal Aunt    Hypertension Mother, Maternal Aunt, Maternal Aunt, Maternal Aunt, Maternal Aunt        Social History Main Topics    Smoking status: Former Smoker     Packs/day: 2.50     Years: 50.00     Types: Cigarettes     Quit date: 1987    Smokeless tobacco: Never Used    Alcohol use No    Drug use: No    Sexual activity: No     Review of Systems   Constitutional: Negative for activity change, diaphoresis, fatigue and unexpected weight change.   HENT: Negative for congestion, ear pain and sore throat.    Eyes: Negative.    Respiratory: Negative for shortness of breath and wheezing.    Cardiovascular: Negative for chest pain and palpitations.   Gastrointestinal: Negative for abdominal pain, constipation, diarrhea and vomiting.   Endocrine: Negative.    Genitourinary: Negative for flank pain, hematuria and urgency.   Musculoskeletal: Negative for joint swelling and neck pain.        Left hip pain     Skin: Negative for pallor.   Neurological: Negative  for seizures, syncope and light-headedness.   Hematological: Negative.    Psychiatric/Behavioral: Negative.      Objective:     Vital Signs (Most Recent):  Temp: 98 °F (36.7 °C) (03/29/17 1615)  Pulse: 77 (03/29/17 1615)  Resp: 18 (03/29/17 1615)  BP: 138/63 (03/29/17 1615)  SpO2: (!) 94 % (03/29/17 1615) Vital Signs (24h Range):  Temp:  [98 °F (36.7 °C)-98.2 °F (36.8 °C)] 98 °F (36.7 °C)  Pulse:  [62-87] 77  Resp:  [12-20] 18  SpO2:  [92 %-100 %] 94 %  BP: (138-224)/() 138/63     Weight: 99.5 kg (219 lb 5.7 oz)  Body mass index is 37.65 kg/(m^2).    Physical Exam   Constitutional: She is oriented to person, place, and time. She appears well-developed and well-nourished.   HENT:   Head: Normocephalic and atraumatic.   Eyes: EOM are normal.   Neck: Normal range of motion. Neck supple.   Cardiovascular: Normal rate, regular rhythm and normal heart sounds.    No murmur heard.  Pulmonary/Chest: Effort normal and breath sounds normal. No respiratory distress.   Abdominal: Soft. Bowel sounds are normal. She exhibits no distension. There is no tenderness.   Musculoskeletal: Normal range of motion. She exhibits no edema.   Neurological: She is alert and oriented to person, place, and time.   Skin: Skin is warm and dry.   Left hip dressing intact. Clean without dressing       Significant Labs:   CBC:   Recent Labs  Lab 03/29/17  1520   HCT 35.0*     CMP: No results for input(s): NA, K, CL, CO2, GLU, BUN, CREATININE, CALCIUM, PROT, ALBUMIN, BILITOT, ALKPHOS, AST, ALT, ANIONGAP, EGFRNONAA in the last 48 hours.    Invalid input(s): [place]    Significant Imaging:   Imaging Results         X-Ray Hip 2 or 3 views Left (Final result) Result time:  03/29/17 14:23:32    Final result by Wilber Verma MD (03/29/17 14:23:32)    Impression:         See above        Electronically signed by: WILEBR VERMA MD  Date:     03/29/17  Time:    14:23     Narrative:    Exam: XR HIP 2 VIEW LEFT    Clinical History: Left hip arthroplasty.   Left hip osteoporosis..    Findings:      6 intraoperative C-arm radiographs were obtained for intraoperative control.  They show removal of the 3 femoral neck screws and interval placement of a left hip arthroplasty with good alignment.Total fluoroscopy time 35.8 seconds.            FL Greater Than 1 Hour (In process)

## 2017-03-30 NOTE — PLAN OF CARE
Problem: Patient Care Overview  Goal: Plan of Care Review  Outcome: Ongoing (interventions implemented as appropriate)  Patient AAO. BP of 114/46 and 110/51 this shift; pt. Asymptomatic.  Remains free of injury. Fall precautions maintained with bed low and wheels locked.  IVF and antibiotics administered as ordered. Pain adequately managed with prn meds. CBG of 158 covered with 1 unit regular insulin.  Chart review for 24 hours completed. Will continue to monitor till discharge.

## 2017-03-30 NOTE — PROGRESS NOTES
"[facility] Hospital Medicine  Progress Note    Patient Name: Maldonado Deleon  MRN: [id number]  Patient Class: IP- Inpatient   Admission Date: 3/29/2017  Length of Stay: 1 days  Attending Physician: Christopher Gutierrez Sr., MD  Primary Care Provider: Bryson Nogueira MD        Subjective:     Principal Problem:Arthritis of left hip    HPI:  Maldonado Deleon is a 81 year old female with history of HTN, COPD, CAD, DM II, and depression who had left hip replacement today performed by Dr. Gutierrez. Surgery without acute complication. Hospital Medicine has been consulted for medical management. She admits to left hip pain from surgery (7/10) but otherwise no complaints.         Hospital Course:      Interval History:complains of increased hip pain today. "not excited about physical therapy today" patient was encouraged. Hemoglobin stable.     Review of Systems   Constitutional: Negative for activity change, diaphoresis, fatigue and unexpected weight change.   HENT: Negative for congestion, ear pain and sore throat.    Eyes: Negative.    Respiratory: Negative for shortness of breath and wheezing.    Cardiovascular: Negative for chest pain and palpitations.   Gastrointestinal: Negative for abdominal pain, constipation, diarrhea and vomiting.   Endocrine: Negative.    Genitourinary: Negative for flank pain, hematuria and urgency.   Musculoskeletal: Negative for joint swelling and neck pain.        Left hip pain     Skin: Negative for pallor.   Neurological: Negative for seizures, syncope and light-headedness.   Hematological: Negative.    Psychiatric/Behavioral: Negative.      Objective:     Vital Signs (Most Recent):  Temp: 98.5 °F (36.9 °C) (03/30/17 0810)  Pulse: (!) 59 (03/30/17 0810)  Resp: 18 (03/30/17 0810)  BP: 102/78 (03/30/17 0810)  SpO2: 96 % (03/30/17 0810) Vital Signs (24h Range):  Temp:  [97.9 °F (36.6 °C)-98.7 °F (37.1 °C)] 98.5 °F (36.9 °C)  Pulse:  [58-87] 59  Resp:  [12-20] 18  SpO2:  [93 %-100 %] 96 " %  BP: (102-224)/() 102/78     Weight: 99.5 kg (219 lb 5.7 oz)  Body mass index is 37.65 kg/(m^2).    Intake/Output Summary (Last 24 hours) at 03/30/17 1109  Last data filed at 03/30/17 0558   Gross per 24 hour   Intake          3164.17 ml   Output             1295 ml   Net          1869.17 ml      Physical Exam   Constitutional: She is oriented to person, place, and time. She appears well-developed and well-nourished.   HENT:   Head: Normocephalic and atraumatic.   Eyes: EOM are normal.   Neck: Normal range of motion. Neck supple.   Cardiovascular: Normal rate, regular rhythm and normal heart sounds.    No murmur heard.  Pulmonary/Chest: Effort normal and breath sounds normal. No respiratory distress.   Abdominal: Soft. Bowel sounds are normal. She exhibits no distension. There is no tenderness.   Genitourinary:   Genitourinary Comments: Magaña catheter no longer present     Musculoskeletal: Normal range of motion. She exhibits no edema.   Neurological: She is alert and oriented to person, place, and time.   Skin: Skin is warm and dry.   Left hip dressing intact. Clean without dressing       Significant Labs:   CBC:   Recent Labs  Lab 03/29/17  1520 03/30/17  0748 03/30/17  0749   HGB  --   --  8.8*   HCT 35.0* 27.5*  --      CMP: No results for input(s): NA, K, CL, CO2, GLU, BUN, CREATININE, CALCIUM, PROT, ALBUMIN, BILITOT, ALKPHOS, AST, ALT, ANIONGAP, EGFRNONAA in the last 48 hours.    Invalid input(s): [place]    Significant Imaging:  Imaging Results         X-Ray Hip 2 or 3 views Left (Final result) Result time:  03/29/17 14:23:32    Final result by Wilber Verma MD (03/29/17 14:23:32)    Impression:         See above        Electronically signed by: WILBER VERMA MD  Date:     03/29/17  Time:    14:23     Narrative:    Exam: XR HIP 2 VIEW LEFT    Clinical History: Left hip arthroplasty.  Left hip osteoporosis..    Findings:      6 intraoperative C-arm radiographs were obtained for intraoperative  control.  They show removal of the 3 femoral neck screws and interval placement of a left hip arthroplasty with good alignment.Total fluoroscopy time 35.8 seconds.            FL Greater Than 1 Hour (In process)             Assessment/Plan:      * Arthritis of left hip  S/p left hip arthroplasty-per primary tean  Pain management  Consulted to social work for discharge planning  H/H stable today  Anticoagulation per Primary team-nursing talked to Dr. Gutierrez and plans to start Eliquis.  PT/OT evaluation today      Chronic obstructive pulmonary disease  -duoneb prn  -LABA      Carotid artery disease without cerebral infarction  Resume Plavix, and STATIN      Hypertension  -resume Atenolol and Benazepril      Controlled type 2 diabetes mellitus without complication, without long-term current use of insulin  -insulin sliding scale  -most recent HgbA1c 5.8  -hold metformin      VTE Risk Mitigation         Ordered     High Risk of VTE  Once      03/29/17 1645     Place CHANNING hose  Until discontinued      03/29/17 1645     Place sequential compression device  Until discontinued      03/29/17 1645      Will Sign off- happy to re-consult if needed    Jim Adhikari NP  Department of Hospital Medicine   [facility]

## 2017-03-30 NOTE — DISCHARGE SUMMARY
[facility]   Discharge Summary      Admit Date: 3/29/2017    Discharge Date and Time: 3/30/2017    Attending Physician: Christopher Gutierrez Sr., MD     Reason for Admission: Left hip hardware removal, Left hip Arthroplasty    Procedures Performed: Procedure(s) (LRB):  ARTHROPLASTY-HIP (ANTERIOR APPROACH)-with excision of synovial chondromatosis (Left)  REMOVAL-HARDWARE-LEFT HIP (Left)  BURSECTOMY-HIP (Left)    Hospital Course (synopsis of major diagnoses, care, treatment, and services provided during the course of the hospital stay): None     ConsultsMedicine, OT,PT    Significant Diagnostic Studies: Labs: All labs within the past 24 hours have been reviewed    Final Diagnoses:    Principal Problem: Arthritis of left hip   Secondary Diagnoses:   Active Hospital Problems    Diagnosis  POA    *Arthritis of left hip [M19.90]  Yes    Controlled type 2 diabetes mellitus without complication, without long-term current use of insulin [E11.9]  Yes    Hypertension [I10]  Yes    Carotid artery disease without cerebral infarction [I77.9]  Yes    Chronic obstructive pulmonary disease [J44.9]  Yes      Resolved Hospital Problems    Diagnosis Date Resolved POA   No resolved problems to display.       Discharged Condition: good    Disposition: Rehab Facility patient greatly benefit from physical therapy and occupational therapy and    Follow Up/Patient Instructions: Discharge to neuro medical, when accepted  Full weightbearing left lower extremity with walker assisted ambulation.  Change dressing prior to discharge, and then daily, apply gauze and duoderm.  Resume regular diet    Medications:  Reconciled Home Medications:   Current Discharge Medication List      CONTINUE these medications which have CHANGED    Details   oxycodone-acetaminophen (PERCOCET)  mg per tablet Take 1 tablet by mouth every 4 (four) hours as needed for Pain.  Qty: 90 tablet, Refills: 0         CONTINUE these medications which have NOT  CHANGED    Details   albuterol-ipratropium 2.5mg-0.5mg/3mL (DUO-NEB) 0.5 mg-3 mg(2.5 mg base)/3 mL nebulizer solution Take 3 mLs by nebulization every 6 (six) hours.  Qty: 120 vial, Refills: 12    Associated Diagnoses: Chronic obstructive pulmonary disease, unspecified COPD type      amlodipine (NORVASC) 5 MG tablet TAKE 1 TABLET TWICE DAILY  Qty: 180 tablet, Refills: 3      atenolol (TENORMIN) 25 MG tablet Take 1 tablet (25 mg total) by mouth once daily.  Qty: 90 tablet, Refills: 3    Associated Diagnoses: Essential hypertension; Atherosclerosis of native coronary artery without angina pectoris      benazepril (LOTENSIN) 20 MG tablet Take 1 tablet (20 mg total) by mouth 2 (two) times daily.  Qty: 180 tablet, Refills: 3      calcium carbonate (OS-RICHY) 600 mg (1,500 mg) Tab Take 600 mg by mouth once daily.      clopidogrel (PLAVIX) 75 mg tablet TAKE ONE TABLET BY MOUTH ONCE DAILY  Qty: 90 tablet, Refills: 2    Associated Diagnoses: Atherosclerosis of native coronary artery without angina pectoris, unspecified whether native or transplanted heart      fluticasone (FLONASE) 50 mcg/actuation nasal spray 2 sprays by Each Nare route once daily.  Qty: 1 Bottle, Refills: 11    Associated Diagnoses: Sinus congestion      fluticasone-vilanterol (BREO ELLIPTA) 200-25 mcg/dose DsDv diskus inhaler Inhale 1 puff into the lungs once daily. Controller  Qty: 60 each, Refills: 11    Associated Diagnoses: Chronic obstructive pulmonary disease, unspecified COPD type      gabapentin (NEURONTIN) 300 MG capsule Take 1 capsule (300 mg total) by mouth 3 (three) times daily.  Qty: 270 capsule, Refills: 3      isosorbide mononitrate (IMDUR) 60 MG 24 hr tablet Take 1 tablet (60 mg total) by mouth 2 (two) times daily.  Qty: 180 tablet, Refills: 3    Associated Diagnoses: AP (angina pectoris)      milk thistle 175 mg tablet Take 175 mg by mouth once daily.      multivitamin (ONE DAILY MULTIVITAMIN) per tablet Take 1 tablet by mouth once daily.       pravastatin (PRAVACHOL) 20 MG tablet Take 1 tablet (20 mg total) by mouth once daily.  Qty: 90 tablet, Refills: 4    Associated Diagnoses: Hyperlipidemia      sertraline (ZOLOFT) 100 MG tablet Take 1 tablet (100 mg total) by mouth every evening.  Qty: 90 tablet, Refills: 3      ipratropium (ATROVENT) 0.06 % nasal spray 2 sprays by Nasal route 4 (four) times daily. As needed for rhinitis  Qty: 15 mL, Refills: 11    Associated Diagnoses: Vasomotor rhinitis      nebulizer accessories Kit Use with nebulizer  Qty: 1 kit, Refills: prn    Associated Diagnoses: COPD (chronic obstructive pulmonary disease)      nitroGLYCERIN (NITROSTAT) 0.4 MG SL tablet Place 1 tablet (0.4 mg total) under the tongue every 5 (five) minutes as needed for Chest pain.  Qty: 60 tablet, Refills: 12         STOP taking these medications       metformin (GLUCOPHAGE) 500 MG tablet Comments:   Reason for Stopping:         aspirin (ECOTRIN) 81 MG EC tablet Comments:   Reason for Stopping:             No discharge procedures on file.  Follow-up Information     Schedule an appointment as soon as possible for a visit with Christopher Gutierrez Sr, MD.    Specialty:  Orthopedic Surgery    Why:  As needed, If symptoms worsen, For suture removal, For wound re-check    Contact information:    [address] [address] [phone]

## 2017-03-30 NOTE — NURSING
Pt was bladder scanned. 325ml in urine. Patient did not have any urge to urinate and did not have any abd fullness. NP notified. Gave pt more time to possibly urinate on her own

## 2017-03-30 NOTE — CONSULTS
[facility] Hospital Medicine  Consult Note    Patient Name: Maldonado Deleon  MRN: [id number]  Admission Date: 3/29/2017  Hospital Length of Stay: 0 days  Attending Physician: Christopher Gutierrez Sr., MD   Primary Care Provider: Bryson Nogueira MD           Patient information was obtained from patient and records.     Consults  Subjective:     Principal Problem: Arthritis of left hip    Chief Complaint: No chief complaint on file.       HPI:      Past Medical History:   Diagnosis Date    Anticoagulant long-term use     Plavix    Atypical chest pain 8/26/2013    Back pain     CAD (coronary artery disease)     CAD in native artery 10/5/2015    Carotid artery disease without cerebral infarction 8/26/2013    Colon cancer     resection and chemo.    COPD (chronic obstructive pulmonary disease)     Depression     Diabetes mellitus type II     Diaphragmatic hernia without obstruction and without gangrene 8/13/2015    Emphysema of lung     Encounter for blood transfusion     Gallstone pancreatitis     History of PTCA 10/5/2015    Hypertension     Lymphocytic colitis     Meningioma     OA (osteoarthritis)     Obesity 8/26/2013    Oxygen dependent     q hs and prn       Past Surgical History:   Procedure Laterality Date    ABDOMINAL SURGERY      BACK SURGERY      x 2    CATARACT EXTRACTION      CHOLECYSTECTOMY      COLECTOMY      CORONARY ANGIOPLASTY      EYE SURGERY      JOINT REPLACEMENT Right     hip replacement x 4    PARATHYROIDECTOMY      SHOULDER SURGERY Right     TOTAL KNEE ARTHROPLASTY Bilateral        Review of patient's allergies indicates:  No Known Allergies    No current facility-administered medications on file prior to encounter.      Current Outpatient Prescriptions on File Prior to Encounter   Medication Sig    amlodipine (NORVASC) 5 MG tablet TAKE 1 TABLET TWICE DAILY (Patient taking differently: Take 5 mg by mouth 2 (two) times daily. TAKE 1 TABLET TWICE DAILY)     atenolol (TENORMIN) 25 MG tablet Take 1 tablet (25 mg total) by mouth once daily. (Patient taking differently: Take 25 mg by mouth every evening. )    benazepril (LOTENSIN) 20 MG tablet Take 1 tablet (20 mg total) by mouth 2 (two) times daily.    fluticasone (FLONASE) 50 mcg/actuation nasal spray 2 sprays by Each Nare route once daily.    gabapentin (NEURONTIN) 300 MG capsule Take 1 capsule (300 mg total) by mouth 3 (three) times daily.    isosorbide mononitrate (IMDUR) 60 MG 24 hr tablet Take 1 tablet (60 mg total) by mouth 2 (two) times daily.    [] metformin (GLUCOPHAGE) 500 MG tablet Take 1 tablet (500 mg total) by mouth 2 (two) times daily with meals.    pravastatin (PRAVACHOL) 20 MG tablet Take 1 tablet (20 mg total) by mouth once daily. (Patient taking differently: Take 20 mg by mouth every evening. )    sertraline (ZOLOFT) 100 MG tablet Take 1 tablet (100 mg total) by mouth every evening.    [DISCONTINUED] aspirin (ECOTRIN) 81 MG EC tablet Take 81 mg by mouth once daily.    nebulizer accessories Kit Use with nebulizer    nitroGLYCERIN (NITROSTAT) 0.4 MG SL tablet Place 1 tablet (0.4 mg total) under the tongue every 5 (five) minutes as needed for Chest pain.     Family History     Problem Relation (Age of Onset)    Diabetes Mother, Maternal Aunt, Maternal Aunt, Maternal Aunt, Maternal Aunt    Heart disease Father    Heart failure Mother, Maternal Aunt, Maternal Aunt, Maternal Aunt, Maternal Aunt    Hypertension Mother, Maternal Aunt, Maternal Aunt, Maternal Aunt, Maternal Aunt        Social History Main Topics    Smoking status: Former Smoker     Packs/day: 2.50     Years: 50.00     Types: Cigarettes     Quit date: 1987    Smokeless tobacco: Never Used    Alcohol use No    Drug use: No    Sexual activity: No     Review of Systems   Constitutional: Negative for activity change, diaphoresis, fatigue and unexpected weight change.   HENT: Negative for congestion, ear pain and sore  throat.    Eyes: Negative.    Respiratory: Negative for shortness of breath and wheezing.    Cardiovascular: Negative for chest pain and palpitations.   Gastrointestinal: Negative for abdominal pain, constipation, diarrhea and vomiting.   Endocrine: Negative.    Genitourinary: Negative for flank pain, hematuria and urgency.   Musculoskeletal: Negative for joint swelling and neck pain.        Left hip pain     Skin: Negative for pallor.   Neurological: Negative for seizures, syncope and light-headedness.   Hematological: Negative.    Psychiatric/Behavioral: Negative.      Objective:     Vital Signs (Most Recent):  Temp: 98 °F (36.7 °C) (03/29/17 1615)  Pulse: 77 (03/29/17 1615)  Resp: 18 (03/29/17 1615)  BP: 138/63 (03/29/17 1615)  SpO2: (!) 94 % (03/29/17 1615) Vital Signs (24h Range):  Temp:  [98 °F (36.7 °C)-98.2 °F (36.8 °C)] 98 °F (36.7 °C)  Pulse:  [62-87] 77  Resp:  [12-20] 18  SpO2:  [92 %-100 %] 94 %  BP: (138-224)/() 138/63     Weight: 99.5 kg (219 lb 5.7 oz)  Body mass index is 37.65 kg/(m^2).    Physical Exam   Constitutional: She is oriented to person, place, and time. She appears well-developed and well-nourished.   HENT:   Head: Normocephalic and atraumatic.   Eyes: EOM are normal.   Neck: Normal range of motion. Neck supple.   Cardiovascular: Normal rate, regular rhythm and normal heart sounds.    No murmur heard.  Pulmonary/Chest: Effort normal and breath sounds normal. No respiratory distress.   Abdominal: Soft. Bowel sounds are normal. She exhibits no distension. There is no tenderness.   Musculoskeletal: Normal range of motion. She exhibits no edema.   Neurological: She is alert and oriented to person, place, and time.   Skin: Skin is warm and dry.   Left hip dressing intact. Clean without dressing       Significant Labs:   CBC:   Recent Labs  Lab 03/29/17  1520   HCT 35.0*     CMP: No results for input(s): NA, K, CL, CO2, GLU, BUN, CREATININE, CALCIUM, PROT, ALBUMIN, BILITOT, ALKPHOS, AST,  ALT, ANIONGAP, EGFRNONAA in the last 48 hours.    Invalid input(s): EST[place]    Significant Imaging:   Imaging Results         X-Ray Hip 2 or 3 views Left (Final result) Result time:  03/29/17 14:23:32    Final result by Wilber Verma MD (03/29/17 14:23:32)    Impression:         See above        Electronically signed by: WILBER VERMA MD  Date:     03/29/17  Time:    14:23     Narrative:    Exam: XR HIP 2 VIEW LEFT    Clinical History: Left hip arthroplasty.  Left hip osteoporosis..    Findings:      6 intraoperative C-arm radiographs were obtained for intraoperative control.  They show removal of the 3 femoral neck screws and interval placement of a left hip arthroplasty with good alignment.Total fluoroscopy time 35.8 seconds.            FL Greater Than 1 Hour (In process)             Assessment/Plan:     * Arthritis of left hip  S/p left hip arthroplasty-per primary tean  Pain management  Consulted to social work for discharge planning  Monitor H/H per acute blood loss anemia  Anticoagulation per Primary team      Chronic obstructive pulmonary disease  -duoneb prn  -LABA      CAD in native artery  -resume Plavix, STATIN,       Hypertension  -resume Atenolol and Benazepril      Controlled type 2 diabetes mellitus without complication, without long-term current use of insulin  -insulin sliding scale  -hold metformin      VTE Risk Mitigation         Ordered     High Risk of VTE  Once      03/29/17 1645     Place CHANNING hose  Until discontinued      03/29/17 1645     Place sequential compression device  Until discontinued      03/29/17 1645        Thank you for your consult. I will follow-up with patient. Please contact us if you have any additional questions.    Jim Adhikari NP  Department of Hospital Medicine   [facility]

## 2017-03-30 NOTE — PLAN OF CARE
Initial assessment completed. Met with patient and her daughter. Discussed options for receiving post hospital services. Patient stated that Dr Gutierrez told her that she would be going to [facility]. Preference ;letter obtained to reflect [facility]. Contacted Chan Dominguez Clinical Liaison with [facility]. Referral faxed via [facility] to [facility].

## 2017-03-30 NOTE — PT/OT/SLP EVAL
Occupational Therapy  Evaluation    Maldonado Deleon   MRN: [id number]   Admitting Diagnosis: Arthritis of left hip    OT Date of Treatment: 03/30/17   OT Start Time: 1030  OT Stop Time: 1055  OT Total Time (min): 25 min    Billable Minutes:  Evaluation 10 MINUTES  Therapeutic Activity 15 MINUTES    Diagnosis: Arthritis of left hip   IMPAIRED ADL'S, DECREASE B UE ROM /STRENGTH/ROM, DECREASE FUNCTIONAL MOBILITY AND T/F'S    Past Medical History:   Diagnosis Date    Anticoagulant long-term use     Plavix    Atypical chest pain 8/26/2013    Back pain     CAD (coronary artery disease)     CAD in native artery 10/5/2015    Carotid artery disease without cerebral infarction 8/26/2013    Colon cancer     resection and chemo.    COPD (chronic obstructive pulmonary disease)     Depression     Diabetes mellitus type II     Diaphragmatic hernia without obstruction and without gangrene 8/13/2015    Emphysema of lung     Encounter for blood transfusion     Gallstone pancreatitis     History of PTCA 10/5/2015    Hypertension     Lymphocytic colitis     Meningioma     OA (osteoarthritis)     Obesity 8/26/2013    Oxygen dependent     q hs and prn      Past Surgical History:   Procedure Laterality Date    ABDOMINAL SURGERY      BACK SURGERY      x 2    CATARACT EXTRACTION      CHOLECYSTECTOMY      COLECTOMY      CORONARY ANGIOPLASTY      EYE SURGERY      JOINT REPLACEMENT Right     hip replacement x 4    PARATHYROIDECTOMY      SHOULDER SURGERY Right     TOTAL KNEE ARTHROPLASTY Bilateral        Referring physician: DR. POSEY  Date referred to OT: 3-29-17    General Precautions: Standard, fall  Orthopedic Precautions: LLE weight bearing as tolerated  Braces:            Patient History:  Living Environment  Lives With: child(kanwal), adult  Living Arrangements: house  Living Environment Comment: pt was for gt x approx 10 feet with rw with 24 hour (s). pt (i) with adl's     Prior level of function:   Bed  Mobility/Transfers: independent  Grooming: independent  Bathing: independent  Upper Body Dressing: independent  Lower Body Dressing: independent  Toileting: independent  Home Management Skills: independent  Driving License: Yes     Dominant hand: right    Subjective:  Communicated with NURSE AND EPIC CHART REVIEW prior to session.    Chief Complaint: IMPAIRED ADL'S, DECREASE B UE ROM /STRENGTH/ROM, DECREASE FUNCTIONAL MOBILITY AND T/F'S    Patient/Family stated goals:     Pain Ratin/10  Location - Side: Left  Location - Orientation: generalized  Location: hip          Objective:  Patient found with: oxygen    Cognitive Exam:  Oriented to: Person, Place, Time and Situation  Follows Commands/attention: Follows one-step commands  Communication: clear/fluent  Memory:  No Deficits noted  Safety awareness/insight to disability: intact  Coping skills/emotional control: Appropriate to situation    Visual/perceptual:  Intact    Physical Exam:  Postural examination/scapula alignment: Rounded shoulder and Head forward  Skin integrity: Visible skin intact and Thin  Edema: Mild L HIP AREA    Sensation:   Intact    Upper Extremity Range of Motion:  Right Upper Extremity: Deficits: APPROX SHOULDER FLEXION 85 DEGREES  Left Upper Extremity: Deficits: SHOULDER FLEXION 90 DEGREES    Upper Extremity Strength:  Right Upper Extremity: Deficits: MMT: 2/5 GROSSLY  Left Upper Extremity: Deficits: MMT: 3/5 GROSSLY   Strength: MMT: 3+/5 GROSSLY    Fine motor coordination:   Intact    Gross motor coordination: WFL    Functional Mobility:  Bed Mobility:  Scooting/Bridging: Minimum Assistance  Supine to Sit: Minimum Assistance    Transfers:  Sit <> Stand Assistance: Moderate Assistance  Sit <> Stand Assistive Device: Rolling Walker  Bed <> Chair Technique: Stand Pivot  Bed <> Chair Transfer Assistance: Moderate Assistance  Bed <> Chair Assistive Device: Rolling Walker    Functional Ambulation: PT REQ MOD A WITH FUNCTIONAL MOBILITY AND  "RW    Activities of Daily Living:     Feeding adaptive equipment: NA  UE Dressing Level of Assistance: Moderate assistance  UE adaptive equipment: NONE  LE Dressing Level of Assistance: Total assistance  LE adaptive equipment: NONE        Toileting Where Assessed: Bed level (fair placement)  Toileting Level of Assistance: Total assistance        Bathing adaptive equipment: NONE    Balance:   Static Sit: FAIR+: Able to take MINIMAL challenges from all directions  Dynamic Sit: FAIR: Cannot move trunk without losing balance  Static Stand: POOR: Needs MODERATE assist to maintain  Dynamic stand: POOR: N/A    Therapeutic Activities and Exercises:      AM-PAC 6 CLICK ADL  How much help from another person does this patient currently need?  1 = Unable, Total/Dependent Assistance  2 = A lot, Maximum/Moderate Assistance  3 = A little, Minimum/Contact Guard/Supervision  4 = None, Modified [place]/Independent         AM-PAC Raw Score CMS "G-Code Modifier Level of Impairment Assistance   6 % Total / Unable   7 - 9 CM 80 - 100% Maximal Assist   10 - 14 CL 60 - 80% Moderate Assist   15 - 19 CK 40 - 60% Moderate Assist   20 - 22 CJ 20 - 40% Minimal Assist   23 CI 1-20% SBA / CGA   24 CH 0% Independent/ Mod I       Patient left up in chair with all lines intact, call button in reach and NURSE RIVERS notified    Assessment:  Maldonado Deleon is a 81 y.o. female with a medical diagnosis of Arthritis of left hip and presents with DEFICITS WITH ADL'S, FUNCTIONAL MOBILITY AND T/F'S AND DECREASE B UE ROM/STRENGTH/ENDURANCE    Rehab identified problem list/impairments: Rehab identified problem list/impairments: weakness, impaired functional mobilty, impaired balance, decreased upper extremity function, decreased safety awareness, impaired endurance, impaired self care skills, gait instability, decreased lower extremity function    Rehab potential is fair.    Activity tolerance: Fair    Discharge recommendations: Discharge " Facility/Level Of Care Needs: nursing facility, skilled     Barriers to discharge:      Equipment recommendations: none     GOALS:   Occupational Therapy Goals        Problem: Occupational Therapy Goal    Goal Priority Disciplines Outcome Interventions   Occupational Therapy Goal     OT, PT/OT     Description:  OT GOALS TO BE MET BY 4-6-17  1. PT WILL REQ MIN A WITH AE FOR LE DRESSING  2. SBA WITH UE DRESSING  3. PT WILL TOLERATE 1 SET X 10 REPS B UE ROM EXERCISE WITH SIMPLE ROM IN ALL AVAILABLE PAIN-FREE RANGE OR PLANES              PLAN:  Patient to be seen 3 x/week to address the above listed problems via self-care/home management, therapeutic exercises, therapeutic activities  Plan of Care expires: 04/06/17  Plan of Care reviewed with: patient         Kaylah Franko, OT  03/30/2017

## 2017-03-30 NOTE — ASSESSMENT & PLAN NOTE
S/p left hip arthroplasty-per primary sravani  Pain management  Consulted to social work for discharge planning  H/H stable today  Anticoagulation per Primary team  PT/OT evaluation today

## 2017-03-30 NOTE — NURSING
Pt was unable to void despite getting out to the BSC. Pt returned to bed with assist of 2. Pt bladder scanned for 625 ml. In and out cath performed for 750 ml clear cherry urine. Pt tolerated well.

## 2017-03-31 LAB
ESTIMATED AVG GLUCOSE: 128 MG/DL
HBA1C MFR BLD HPLC: 6.1 %
HGB BLD-MCNC: 7.8 G/DL
POCT GLUCOSE: 164 MG/DL (ref 70–110)
POCT GLUCOSE: 175 MG/DL (ref 70–110)
POCT GLUCOSE: 225 MG/DL (ref 70–110)
POCT GLUCOSE: 230 MG/DL (ref 70–110)

## 2017-03-31 PROCEDURE — 11000001 HC ACUTE MED/SURG PRIVATE ROOM

## 2017-03-31 PROCEDURE — 94640 AIRWAY INHALATION TREATMENT: CPT

## 2017-03-31 PROCEDURE — 25000003 PHARM REV CODE 250: Performed by: INTERNAL MEDICINE

## 2017-03-31 PROCEDURE — 97530 THERAPEUTIC ACTIVITIES: CPT

## 2017-03-31 PROCEDURE — 25000003 PHARM REV CODE 250: Performed by: NURSE PRACTITIONER

## 2017-03-31 PROCEDURE — 36415 COLL VENOUS BLD VENIPUNCTURE: CPT

## 2017-03-31 PROCEDURE — 96372 THER/PROPH/DIAG INJ SC/IM: CPT

## 2017-03-31 PROCEDURE — 85018 HEMOGLOBIN: CPT

## 2017-03-31 PROCEDURE — 63600175 PHARM REV CODE 636 W HCPCS: Performed by: NURSE PRACTITIONER

## 2017-03-31 PROCEDURE — 27000221 HC OXYGEN, UP TO 24 HOURS

## 2017-03-31 PROCEDURE — 25000003 PHARM REV CODE 250: Performed by: ORTHOPAEDIC SURGERY

## 2017-03-31 RX ORDER — ACETAMINOPHEN 325 MG/1
650 TABLET ORAL EVERY 6 HOURS PRN
Status: DISCONTINUED | OUTPATIENT
Start: 2017-03-31 | End: 2017-04-03

## 2017-03-31 RX ADMIN — ARFORMOTEROL TARTRATE 15 MCG: 15 SOLUTION RESPIRATORY (INHALATION) at 07:03

## 2017-03-31 RX ADMIN — OXYCODONE HYDROCHLORIDE 10 MG: 5 TABLET ORAL at 12:03

## 2017-03-31 RX ADMIN — ISOSORBIDE MONONITRATE 60 MG: 60 TABLET, EXTENDED RELEASE ORAL at 09:03

## 2017-03-31 RX ADMIN — SERTRALINE HYDROCHLORIDE 100 MG: 50 TABLET, FILM COATED ORAL at 09:03

## 2017-03-31 RX ADMIN — BENAZEPRIL HYDROCHLORIDE 20 MG: 20 TABLET, FILM COATED ORAL at 09:03

## 2017-03-31 RX ADMIN — INSULIN ASPART 4 UNITS: 100 INJECTION, SOLUTION INTRAVENOUS; SUBCUTANEOUS at 11:03

## 2017-03-31 RX ADMIN — GABAPENTIN 300 MG: 300 CAPSULE ORAL at 06:03

## 2017-03-31 RX ADMIN — CLOPIDOGREL BISULFATE 75 MG: 75 TABLET ORAL at 09:03

## 2017-03-31 RX ADMIN — ATENOLOL 25 MG: 25 TABLET ORAL at 09:03

## 2017-03-31 RX ADMIN — INSULIN ASPART 2 UNITS: 100 INJECTION, SOLUTION INTRAVENOUS; SUBCUTANEOUS at 05:03

## 2017-03-31 RX ADMIN — OXYCODONE HYDROCHLORIDE 10 MG: 5 TABLET ORAL at 05:03

## 2017-03-31 RX ADMIN — OXYCODONE HYDROCHLORIDE 10 MG: 5 TABLET ORAL at 09:03

## 2017-03-31 RX ADMIN — IPRATROPIUM BROMIDE 2 SPRAY: 42 SPRAY NASAL at 06:03

## 2017-03-31 RX ADMIN — INSULIN ASPART 2 UNITS: 100 INJECTION, SOLUTION INTRAVENOUS; SUBCUTANEOUS at 06:03

## 2017-03-31 RX ADMIN — INSULIN ASPART 4 UNITS: 100 INJECTION, SOLUTION INTRAVENOUS; SUBCUTANEOUS at 10:03

## 2017-03-31 RX ADMIN — ACETAMINOPHEN 650 MG: 325 TABLET ORAL at 09:03

## 2017-03-31 RX ADMIN — IPRATROPIUM BROMIDE 2 SPRAY: 42 SPRAY NASAL at 05:03

## 2017-03-31 RX ADMIN — GABAPENTIN 300 MG: 300 CAPSULE ORAL at 09:03

## 2017-03-31 RX ADMIN — OXYCODONE HYDROCHLORIDE 10 MG: 5 TABLET ORAL at 06:03

## 2017-03-31 RX ADMIN — APIXABAN 2.5 MG: 2.5 TABLET, FILM COATED ORAL at 09:03

## 2017-03-31 RX ADMIN — GABAPENTIN 300 MG: 300 CAPSULE ORAL at 02:03

## 2017-03-31 RX ADMIN — IPRATROPIUM BROMIDE 2 SPRAY: 42 SPRAY NASAL at 11:03

## 2017-03-31 RX ADMIN — PRAVASTATIN SODIUM 20 MG: 20 TABLET ORAL at 09:03

## 2017-03-31 NOTE — PLAN OF CARE
Problem: Patient Care Overview  Goal: Plan of Care Review  PT REQUIRES MOD A FOR T/F TO CHAIR WBAT.   Outcome: Ongoing (interventions implemented as appropriate)  Patient is AAOx4. Patient remained free from falls and injury. Will continue to monitor. 12hr chart check completed.

## 2017-03-31 NOTE — PLAN OF CARE
Problem: Occupational Therapy Goal  Goal: Occupational Therapy Goal  OT GOALS TO BE MET BY 4-6-17  1. PT WILL REQ MIN A WITH AE FOR LE DRESSING  2. SBA WITH UE DRESSING  3. PT WILL TOLERATE 1 SET X 10 REPS B UE ROM EXERCISE WITH SIMPLE ROM IN ALL AVAILABLE PAIN-FREE RANGE OR PLANES   Outcome: Ongoing (interventions implemented as appropriate)  PT LIMITED DUE TO INCREASE PAIN THIS SESSION. PT REFUSED CONTINUE WITH THERAPY DESPITE MAX ENCOURAGEMENT

## 2017-03-31 NOTE — PLAN OF CARE
Problem: Patient Care Overview  Goal: Plan of Care Review  PT REQUIRES MOD A FOR T/F TO CHAIR WBAT.   Outcome: Ongoing (interventions implemented as appropriate)  3l nc, respirations even and unlabored, no distress noted, pain, shortness of breath at times, no nausea, left hip dressing with old drainage, bed alarm, fair patent

## 2017-03-31 NOTE — PLAN OF CARE
"Problem: Patient Care Overview  Goal: Plan of Care Review  PT REQUIRES MOD A FOR T/F TO CHAIR WBAT.   Outcome: Ongoing (interventions implemented as appropriate)  Fall precautions maintained, pt free from injuries/fall, pt encouraged to reposition and ambulate in room, pt refuses at times, pt educated about ambulation and turning, verbalizes understanding of importance, "I'll turn when I want to, I've never had any wound (pressure ulcer)." C/o LLE pain during activity and ambulation, moderately relieved by elevation, rest, immobilization, and prn pain meds. Numbness present on LLE r/t neuropathy, pt able to move toes, cap refill <3.  POCT monitored, coverage given as needed. POC and meds reviewed w/ pt, pt verbalizes understanding. Chart check done. Will cont to monitor.                   "

## 2017-03-31 NOTE — PLAN OF CARE
Problem: Patient Care Overview  Goal: Plan of Care Review  PT REQUIRES MOD A FOR T/F TO CHAIR WBAT.   Outcome: Ongoing (interventions implemented as appropriate)  PT REQUIRES MIN A FOR GT X 3' WITH RW

## 2017-03-31 NOTE — PT/OT/SLP PROGRESS
Occupational Therapy  Treatment    Maldonado Deleon   MRN: [id number]   Admitting Diagnosis: Arthritis of left hip    OT Date of Treatment: 17   OT Start Time: 930  OT Stop Time: 955  OT Total Time (min): 25 min    Billable Minutes:  Therapeutic Activity 25 MINUTES    General Precautions: Standard, fall  Orthopedic Precautions: LLE weight bearing as tolerated  Braces:           Subjective:  Communicated with NURSE ONEIL AND EPIC CHART REVIEW prior to session.      Pain Ratin/10  Location - Side: Left  Location - Orientation: generalized  Location: hip          Objective:  Patient found with: oxygen     Functional Mobility:  Bed Mobility:  Scooting/Bridging: Moderate Assistance  Supine to Sit: Moderate Assistance    Transfers:   Sit <> Stand Assistance: Minimum Assistance  Sit <> Stand Assistive Device: Rolling Walker  Bed <> Chair Technique: Stand Pivot  Bed <> Chair Transfer Assistance: Minimum Assistance    Functional Ambulation: PT AMBULATED 3 STEPS WITH MIN A  WITH RW. PT REFUSED TO CONTINUE WITH FUNCTIONAL MOBILITY ACTIVITY    Activities of Daily Living:     Feeding adaptive equipment: NA  UE Dressing Level of Assistance: Minimum assistance  UE adaptive equipment: NA     LE adaptive equipment: NA                    Bathing adaptive equipment: NA    Balance:   Static Sit: FAIR+: Able to take MINIMAL challenges from all directions  Dynamic Sit: NOT TESTED  Static Stand: POOR+: Needs MINIMAL assist to maintain  Dynamic stand: POOR: N/A    Therapeutic Activities and Exercises:  PT TX SESSION LIMITED DUE TO PAIN THIS SESSION. PT REFUSED TO CONTINUE THERAPY ACTIVITY DEPITE MAX ECOURGMENT. PT CONTINUE TO WORK TOWARD (I) WITH UE , FUNCTIONAL MOBILITY AND T/F'S.    AM-PAC 6 CLICK ADL   How much help from another person does this patient currently need?   1 = Unable, Total/Dependent Assistance  2 = A lot, Maximum/Moderate Assistance  3 = A little, Minimum/Contact Guard/Supervision  4 = None, Modified  [place]/Independent          AM-PAC Raw Score CMS G-Code Modifier Level of Impairment Assistance   6 % Total / Unable   7 - 9 CM 80 - 100% Maximal Assist   10 - 14 CL 60 - 80% Moderate Assist   15 - 19 CK 40 - 60% Moderate Assist   20 - 22 CJ 20 - 40% Minimal Assist   23 CI 1-20% SBA / CGA   24 CH 0% Independent/ Mod I     Patient left up in chair with all lines intact, call button in reach and NURSE ONEIL notified    ASSESSMENT:  Maldonado Deleon is a 81 y.o. female with a medical diagnosis of Arthritis of left hip and presents with Impaired functional mobility, impaired t/f's, decrease b ue strength/endurance, impaired adl's. PT WILL BENEFIT FROM CONTINUE THERAPY SESSION       Rehab identified problem list/impairments: Rehab identified problem list/impairments: weakness, impaired functional mobilty, impaired balance, decreased upper extremity function, decreased safety awareness, impaired coordination, impaired self care skills, impaired endurance, gait instability, decreased coordination, pain, decreased ROM    Rehab potential is good.    Activity tolerance: Fair    Discharge recommendations: Discharge Facility/Level Of Care Needs: nursing facility, skilled     Barriers to discharge: Barriers to Discharge: Decreased caregiver support    Equipment recommendations: none     GOALS:   Occupational Therapy Goals        Problem: Occupational Therapy Goal    Goal Priority Disciplines Outcome Interventions   Occupational Therapy Goal     OT, PT/OT Ongoing (interventions implemented as appropriate)    Description:  OT GOALS TO BE MET BY 4-6-17  1. PT WILL REQ MIN A WITH AE FOR LE DRESSING  2. SBA WITH UE DRESSING  3. PT WILL TOLERATE 1 SET X 10 REPS B UE ROM EXERCISE WITH SIMPLE ROM IN ALL AVAILABLE PAIN-FREE RANGE OR PLANES              Plan:  Patient to be seen 3 x/week to address the above listed problems via self-care/home management, therapeutic exercises, therapeutic activities  Plan of Care expires:  04/06/17  Plan of Care reviewed with: patient         Kaylah Rogerszier, OT  03/31/2017

## 2017-03-31 NOTE — PT/OT/SLP PROGRESS
Physical Therapy  Treatment    Maldonado Deleon   MRN: [id number]   Admitting Diagnosis: Arthritis of left hip    PT Received On: 17  PT Start Time: 908     PT Stop Time: 931    PT Total Time (min): 23 min       Billable Minutes:  Therapeutic Activity 23    Treatment Type: Treatment  PT/PTA: PT             General Precautions: Standard, fall  Orthopedic Precautions: LLE weight bearing as tolerated   Braces:           Subjective:  Communicated with NURSE RIGGS AND EPIC CHART REVIEW  prior to session.   PT AGREED TO TX WITH INC ENCOURAGEMENT    Pain Ratin10  Location - Side: Left     Location: hip     Pain Rating Post-Intervention: 8/10    Objective:   Patient found with: oxygen    Functional Mobility:  Bed Mobility:   Rolling/Turning Right: Minimum assistance  Supine to Sit: Minimum Assistance    Transfers:  Sit <> Stand Assistance: Moderate Assistance  Sit <> Stand Assistive Device: Rolling Walker  Bed <> Chair Technique: Stand Pivot  Bed <> Chair Assistance: Minimum Assistance  Bed <> Chair Assistive Device: Rolling Walker    Gait:   Gait Distance: PT GT TRAINED X 3' WITH RW AND STEP TO GT . PT REFUSED TO CONT GT TRAINING.   Assistance 1: Minimum assistance  Gait Assistive Device: Rolling walker  Gait Pattern: swing-to gait  Gait Deviation(s): decreased danish, decreased weight-shifting ability    Therapeutic Activities and Exercises:  PT GT TRAINED AND T/F TO CHAIR WITH RW AND MIN A WITH CUES FOR SAFETY AND LE ADVANCEMENT. PT SEATED IN CHAIR AND EDUCATED ON ROM TE. PT REFUSED TO PARTICIPATE IN P.T. AT THIS TIME. PT PAIN ADDRESSED NURSE RIGGS GAVE PAIN MEDS.  PT LEFT SEATED IN CHAIR WITH ALL NEEDS MET     AM-PAC 6 CLICK MOBILITY  How much help from another person does this patient currently need?   1 = Unable, Total/Dependent Assistance  2 = A lot, Maximum/Moderate Assistance  3 = A little, Minimum/Contact Guard/Supervision  4 = None, Modified [facility]/Independent         AM-PAC Raw Score CMS G-Code  Modifier Level of Impairment Assistance   6 % Total / Unable   7 - 9 CM 80 - 100% Maximal Assist   10 - 14 CL 60 - 80% Moderate Assist   15 - 19 CK 40 - 60% Moderate Assist   20 - 22 CJ 20 - 40% Minimal Assist   23 CI 1-20% SBA / CGA   24 CH 0% Independent/ Mod I     Patient left up in chair with call button in reach.    Assessment:  PT PROGRESSING WITH GT .    Rehab identified problem list/impairments: Rehab identified problem list/impairments: weakness, impaired endurance, impaired sensation, impaired self care skills, impaired functional mobilty, gait instability, impaired balance, pain, decreased safety awareness, decreased lower extremity function, decreased upper extremity function, decreased ROM    Rehab potential is good.    Activity tolerance: Fair    Discharge recommendations: Discharge Facility/Level Of Care Needs: nursing facility, skilled     Barriers to discharge: Barriers to Discharge: Decreased caregiver support    Equipment recommendations: Equipment Needed After Discharge: none     GOALS:   Physical Therapy Goals        Problem: Physical Therapy Goal    Goal Priority Disciplines Outcome Goal Variances Interventions   Physical Therapy Goal     PT/OT, PT      Description:  PT WILL BE SEEN FOR P.T. FOR A MIN OF 5 OUT OF 7 DAYS A WEEK  LT17  1. PT WILL COMPLETE BED MOBILITY WITH SBA.  2. PT WILL T/F TO CHAIR WITH RW AND MIN A  3. PT WILL GT TRAIN X 100' WITH RW AND MIN A  4. PT WILL COMPLETE B LE TE X 20 REPS FOR ROM AND STRENGTHENING.               PLAN:    Patient to be seen    to address the above listed problems via gait training, therapeutic activities, therapeutic exercises  Plan of Care expires:    Plan of Care reviewed with: patient         Nova Demarco, PT  2017

## 2017-03-31 NOTE — PROGRESS NOTES
Patient was swinging at [facility] when trying to put her in the bed pan. I educated the patient that she had to try and urinate before I called the MD. Patient stated that she just wants a catheter and doesn't want to try to urinate.

## 2017-03-31 NOTE — PLAN OF CARE
Problem: Patient Care Overview  Goal: Plan of Care Review  PT REQUIRES MOD A FOR T/F TO CHAIR WBAT.   Outcome: Ongoing (interventions implemented as appropriate)  .

## 2017-04-01 LAB
POCT GLUCOSE: 133 MG/DL (ref 70–110)
POCT GLUCOSE: 147 MG/DL (ref 70–110)

## 2017-04-01 PROCEDURE — 94640 AIRWAY INHALATION TREATMENT: CPT

## 2017-04-01 PROCEDURE — 11000001 HC ACUTE MED/SURG PRIVATE ROOM

## 2017-04-01 PROCEDURE — 27000221 HC OXYGEN, UP TO 24 HOURS

## 2017-04-01 PROCEDURE — 63600175 PHARM REV CODE 636 W HCPCS: Performed by: NURSE PRACTITIONER

## 2017-04-01 PROCEDURE — 25000003 PHARM REV CODE 250: Performed by: NURSE PRACTITIONER

## 2017-04-01 PROCEDURE — 25000003 PHARM REV CODE 250: Performed by: ORTHOPAEDIC SURGERY

## 2017-04-01 PROCEDURE — 63600175 PHARM REV CODE 636 W HCPCS: Performed by: ORTHOPAEDIC SURGERY

## 2017-04-01 PROCEDURE — 96372 THER/PROPH/DIAG INJ SC/IM: CPT

## 2017-04-01 RX ADMIN — IPRATROPIUM BROMIDE 2 SPRAY: 42 SPRAY NASAL at 06:04

## 2017-04-01 RX ADMIN — ISOSORBIDE MONONITRATE 60 MG: 60 TABLET, EXTENDED RELEASE ORAL at 08:04

## 2017-04-01 RX ADMIN — BENAZEPRIL HYDROCHLORIDE 20 MG: 20 TABLET, FILM COATED ORAL at 08:04

## 2017-04-01 RX ADMIN — ARFORMOTEROL TARTRATE 15 MCG: 15 SOLUTION RESPIRATORY (INHALATION) at 08:04

## 2017-04-01 RX ADMIN — IPRATROPIUM BROMIDE 2 SPRAY: 42 SPRAY NASAL at 12:04

## 2017-04-01 RX ADMIN — OXYCODONE HYDROCHLORIDE 10 MG: 5 TABLET ORAL at 05:04

## 2017-04-01 RX ADMIN — CLOPIDOGREL BISULFATE 75 MG: 75 TABLET ORAL at 08:04

## 2017-04-01 RX ADMIN — IPRATROPIUM BROMIDE 2 SPRAY: 42 SPRAY NASAL at 05:04

## 2017-04-01 RX ADMIN — OXYCODONE HYDROCHLORIDE 10 MG: 5 TABLET ORAL at 12:04

## 2017-04-01 RX ADMIN — APIXABAN 2.5 MG: 2.5 TABLET, FILM COATED ORAL at 08:04

## 2017-04-01 RX ADMIN — GABAPENTIN 300 MG: 300 CAPSULE ORAL at 06:04

## 2017-04-01 RX ADMIN — OXYCODONE HYDROCHLORIDE 10 MG: 5 TABLET ORAL at 08:04

## 2017-04-01 RX ADMIN — GABAPENTIN 300 MG: 300 CAPSULE ORAL at 09:04

## 2017-04-01 RX ADMIN — GABAPENTIN 300 MG: 300 CAPSULE ORAL at 02:04

## 2017-04-01 RX ADMIN — ARFORMOTEROL TARTRATE 15 MCG: 15 SOLUTION RESPIRATORY (INHALATION) at 07:04

## 2017-04-01 RX ADMIN — MORPHINE SULFATE 2 MG: 2 INJECTION, SOLUTION INTRAMUSCULAR; INTRAVENOUS at 01:04

## 2017-04-01 RX ADMIN — SERTRALINE HYDROCHLORIDE 100 MG: 50 TABLET, FILM COATED ORAL at 08:04

## 2017-04-01 RX ADMIN — IPRATROPIUM BROMIDE 2 SPRAY: 42 SPRAY NASAL at 11:04

## 2017-04-01 RX ADMIN — PRAVASTATIN SODIUM 20 MG: 20 TABLET ORAL at 08:04

## 2017-04-01 RX ADMIN — OXYCODONE HYDROCHLORIDE 10 MG: 5 TABLET ORAL at 09:04

## 2017-04-01 RX ADMIN — ATENOLOL 25 MG: 25 TABLET ORAL at 08:04

## 2017-04-01 NOTE — PLAN OF CARE
Problem: Patient Care Overview  Goal: Plan of Care Review  PT REQUIRES MOD A FOR T/F TO CHAIR WBAT.   Outcome: Ongoing (interventions implemented as appropriate)  Injury free this shift. She refuses PT and refuses to be turned. Educated on importance. Verbalizes understanding. Skin intact without redness. Pain controlled. Dressing changed per order. [place] removed. Voiding spontaneously. Chart reviewed. Will monitor.

## 2017-04-01 NOTE — PROGRESS NOTES
SUBJECTIVE:  Patient is status post Left JOHN.  Patient complains of 5/ 10 pain that is better with the pain meds and aggravated with movement.   The patient is alert and oriented times three.            Past Surgical History:   Procedure Laterality Date    ABDOMINAL SURGERY        BACK SURGERY         x 2    CATARACT EXTRACTION        CHOLECYSTECTOMY        COLECTOMY        CORONARY ANGIOPLASTY        EYE SURGERY        JOINT REPLACEMENT Right       hip replacement x 4    PARATHYROIDECTOMY        SHOULDER SURGERY Right      TOTAL KNEE ARTHROPLASTY Bilateral        Review of patient's allergies indicates:  No Known Allergies  No current facility-administered medications on file prior to encounter.              Current Outpatient Prescriptions on File Prior to Encounter   Medication Sig Dispense Refill    amlodipine (NORVASC) 5 MG tablet TAKE 1 TABLET TWICE DAILY (Patient taking differently: Take 5 mg by mouth 2 (two) times daily. TAKE 1 TABLET TWICE DAILY) 180 tablet 3    atenolol (TENORMIN) 25 MG tablet Take 1 tablet (25 mg total) by mouth once daily. (Patient taking differently: Take 25 mg by mouth every evening. ) 90 tablet 3    benazepril (LOTENSIN) 20 MG tablet Take 1 tablet (20 mg total) by mouth 2 (two) times daily. 180 tablet 3    fluticasone (FLONASE) 50 mcg/actuation nasal spray 2 sprays by Each Nare route once daily. 1 Bottle 11    gabapentin (NEURONTIN) 300 MG capsule Take 1 capsule (300 mg total) by mouth 3 (three) times daily. 270 capsule 3    isosorbide mononitrate (IMDUR) 60 MG 24 hr tablet Take 1 tablet (60 mg total) by mouth 2 (two) times daily. 180 tablet 3    pravastatin (PRAVACHOL) 20 MG tablet Take 1 tablet (20 mg total) by mouth once daily. (Patient taking differently: Take 20 mg by mouth every evening. ) 90 tablet 4    sertraline (ZOLOFT) 100 MG tablet Take 1 tablet (100 mg total) by mouth every evening. 90 tablet 3    nebulizer accessories Kit Use with nebulizer 1 kit prn  "   nitroGLYCERIN (NITROSTAT) 0.4 MG SL tablet Place 1 tablet (0.4 mg total) under the tongue every 5 (five) minutes as needed for Chest pain. 60 tablet 12      BP (!) 104/47 (BP Location: Left arm, Patient Position: Lying, BP Method: Automatic)  Pulse 75  Temp 100 °F (37.8 °C) (Oral)  Resp (!) 1  Ht 5' 4" (1.626 m)  Wt 99.5 kg (219 lb 5.7 oz)  SpO2 (!) 90%  Breastfeeding? No  BMI 37.65 kg/m2         ROS:  GENERAL: No fever, chills, fatigability or weight loss.  SKIN: No rashes, itching or changes in color or texture of skin.  HEAD: No headaches or recent head trauma.  EYES: Visual acuity fine. No photophobia, ocular pain or diplopia.  EARS: Denies ear pain, discharge or vertigo.  NOSE: No loss of smell, no epistaxis or postnasal drip.  MOUTH & THROAT: No hoarseness or change in voice. No excessive gum bleeding.  NODES: Denies swollen glands.  CHEST: Denies COTTER, cyanosis, wheezing, cough and sputum production.  CARDIOVASCULAR: Denies chest pain, PND, orthopnea or reduced exercise tolerance.  ABDOMEN: Appetite fine. No weight loss. Denies diarrhea, abdominal pain, hematemesis or blood in stool.  URINARY: No flank pain, dysuria or hematuria.  PERIPHERAL VASCULAR: No claudication or cyanosis.  NEUROLOGIC: No history of seizures, paralysis, alteration of gait or coordination.  MUSCULOSKELETAL: See HPI     PE:  APPEARANCE: Well nourished, well developed, in no acute distress.   HEAD: Normocephalic, atraumatic.  EYES: PERRL. EOMI.   EARS: TM's intact. Light reflex normal. No retraction or perforation.   NOSE: Mucosa pink. Airway clear.  MOUTH & THROAT: No tonsillar enlargement. No pharyngeal erythema or exudate. No stridor.  NECK: Supple.   NODES: No cervical, axillary or inguinal lymph node enlargement.  CHEST: Lungs clear to auscultation.  CARDIOVASCULAR: Normal S1, S2. No rubs, murmurs or gallops.  ABDOMEN: Bowel sounds normal. Not distended. Soft. No tenderness or masses.  NEUROLOGIC: Cranial Nerves: II-XII " grossly intact, also see MUSCULOSKELETAL  MUSCULOSKELETAL:      Left Hip -dressing intact, 2 plus dorsalis pedis and posterior tibial artery pulses, light touch intact Left lower extremity. All digits are warm. No erythema, no warmth, no drainage, no swelling, no significant tenderness. Less than 2 seconds capillary refill all digits.        ASSESSMENT:   The patient's motiviation level seems low.  I explained to her the importance of cooperating with PT.   The patient is stable and improving.        PLAN:  Consult SNF  Continue pain medication  Continue physical therapy

## 2017-04-01 NOTE — PT/OT/SLP PROGRESS
Pt refused PT this date 2* just receiving medications and wanting to rest. PTA explained benefits of participating in therapy, but pt continues to refuse. Will attempt at later date/time.   Informed Nurse Yi of pt refusing PT.

## 2017-04-01 NOTE — PLAN OF CARE
Problem: Patient Care Overview  Goal: Plan of Care Review  PT REQUIRES MOD A FOR T/F TO CHAIR WBAT.   Outcome: Ongoing (interventions implemented as appropriate)  POC reviewed with patient and her family. Bladder training program in progress for catheter removal in AM. Pt reports constant pain; refuses to cooperate with moving/repositioning. Pt states Oxycodone is ineffective at relieving her pain after nursing staff repositions her. Morphine given for breakthrough pain per order. Dressing intact. Neurovascular assessment of LLE was WDL with 2+ pulse, warm temp, and no numbness or tingling reported. Pt had temp of 102.5. Tylenol given per order. Pt encouraged to cough and deep breathe. Pt in no acute distress; will continue to monitor.

## 2017-04-01 NOTE — NURSING
Pt had temp of 102.5; no order for acetaminophen. MD notified and VO obtained for acetaminophen 650 mg q 6 h. Pt also encouraged to ambulate and perform incentive spirometry, 10 reps every hour while awake.

## 2017-04-01 NOTE — PROGRESS NOTES
Consult received for Discharge planning 3/29/2017:  Per notes, awaiting authorization from [facility].  Cadnice Javier, ALY, ACJENI-SW, cCM

## 2017-04-02 LAB
POCT GLUCOSE: 124 MG/DL (ref 70–110)
POCT GLUCOSE: 142 MG/DL (ref 70–110)
POCT GLUCOSE: 143 MG/DL (ref 70–110)

## 2017-04-02 PROCEDURE — 94640 AIRWAY INHALATION TREATMENT: CPT

## 2017-04-02 PROCEDURE — 25000003 PHARM REV CODE 250: Performed by: ORTHOPAEDIC SURGERY

## 2017-04-02 PROCEDURE — 11000001 HC ACUTE MED/SURG PRIVATE ROOM

## 2017-04-02 PROCEDURE — 25000003 PHARM REV CODE 250: Performed by: NURSE PRACTITIONER

## 2017-04-02 PROCEDURE — 27000221 HC OXYGEN, UP TO 24 HOURS

## 2017-04-02 PROCEDURE — 63600175 PHARM REV CODE 636 W HCPCS: Performed by: NURSE PRACTITIONER

## 2017-04-02 PROCEDURE — 97110 THERAPEUTIC EXERCISES: CPT

## 2017-04-02 PROCEDURE — 25000003 PHARM REV CODE 250: Performed by: INTERNAL MEDICINE

## 2017-04-02 PROCEDURE — 97116 GAIT TRAINING THERAPY: CPT

## 2017-04-02 PROCEDURE — 63600175 PHARM REV CODE 636 W HCPCS: Performed by: ORTHOPAEDIC SURGERY

## 2017-04-02 RX ADMIN — PRAVASTATIN SODIUM 20 MG: 20 TABLET ORAL at 08:04

## 2017-04-02 RX ADMIN — OXYCODONE HYDROCHLORIDE 10 MG: 5 TABLET ORAL at 05:04

## 2017-04-02 RX ADMIN — ATENOLOL 25 MG: 25 TABLET ORAL at 08:04

## 2017-04-02 RX ADMIN — ISOSORBIDE MONONITRATE 60 MG: 60 TABLET, EXTENDED RELEASE ORAL at 09:04

## 2017-04-02 RX ADMIN — BENAZEPRIL HYDROCHLORIDE 20 MG: 20 TABLET, FILM COATED ORAL at 09:04

## 2017-04-02 RX ADMIN — SERTRALINE HYDROCHLORIDE 100 MG: 50 TABLET, FILM COATED ORAL at 08:04

## 2017-04-02 RX ADMIN — IPRATROPIUM BROMIDE 2 SPRAY: 42 SPRAY NASAL at 02:04

## 2017-04-02 RX ADMIN — BENAZEPRIL HYDROCHLORIDE 20 MG: 20 TABLET, FILM COATED ORAL at 08:04

## 2017-04-02 RX ADMIN — INSULIN ASPART 1 UNITS: 100 INJECTION, SOLUTION INTRAVENOUS; SUBCUTANEOUS at 11:04

## 2017-04-02 RX ADMIN — GABAPENTIN 300 MG: 300 CAPSULE ORAL at 05:04

## 2017-04-02 RX ADMIN — ARFORMOTEROL TARTRATE 15 MCG: 15 SOLUTION RESPIRATORY (INHALATION) at 07:04

## 2017-04-02 RX ADMIN — OXYCODONE HYDROCHLORIDE 10 MG: 5 TABLET ORAL at 11:04

## 2017-04-02 RX ADMIN — ISOSORBIDE MONONITRATE 60 MG: 60 TABLET, EXTENDED RELEASE ORAL at 08:04

## 2017-04-02 RX ADMIN — GABAPENTIN 300 MG: 300 CAPSULE ORAL at 02:04

## 2017-04-02 RX ADMIN — IPRATROPIUM BROMIDE 2 SPRAY: 42 SPRAY NASAL at 05:04

## 2017-04-02 RX ADMIN — GABAPENTIN 300 MG: 300 CAPSULE ORAL at 11:04

## 2017-04-02 RX ADMIN — OXYCODONE HYDROCHLORIDE 10 MG: 5 TABLET ORAL at 12:04

## 2017-04-02 RX ADMIN — ACETAMINOPHEN 650 MG: 325 TABLET ORAL at 08:04

## 2017-04-02 RX ADMIN — MORPHINE SULFATE 2 MG: 2 INJECTION, SOLUTION INTRAMUSCULAR; INTRAVENOUS at 02:04

## 2017-04-02 RX ADMIN — APIXABAN 2.5 MG: 2.5 TABLET, FILM COATED ORAL at 09:04

## 2017-04-02 RX ADMIN — CLOPIDOGREL BISULFATE 75 MG: 75 TABLET ORAL at 09:04

## 2017-04-02 RX ADMIN — APIXABAN 2.5 MG: 2.5 TABLET, FILM COATED ORAL at 08:04

## 2017-04-02 NOTE — PLAN OF CARE
Problem: Patient Care Overview  Goal: Plan of Care Review  PT REQUIRES MOD A FOR T/F TO CHAIR WBAT.   Outcome: Ongoing (interventions implemented as appropriate)  Patient AAO and VSS. Remains free of injury. Fall precautions maintained with bed low and wheels locked. Patient continues to request the Morphine via IV fir breakthrough pain. Takes her nearly 20 minutes to transfer from bed to bedside commode and back to bed.  She refuses to walk to restroom, due to pain.  Educated patient on rationale behind getting out of bed and trying to move around.  Explained that if she does not begin to move, she will not re-gain strength in LLE, pain will continue when she does try, bowels could possibly lock up on her, and she is at risk for getting a bed sore. Explained to patient, in order to recover, she will have to try and bear some pain. Chart review for 24 hours completed. Will continue to monitor till discharge.

## 2017-04-02 NOTE — PROGRESS NOTES
SUBJECTIVE:  Patient is status post Left JOHN.  Patient complains of 5/ 10 pain that is better with the pain meds and aggravated with movement.   The patient is alert and oriented times three.                Past Surgical History:   Procedure Laterality Date    ABDOMINAL SURGERY          BACK SURGERY            x 2    CATARACT EXTRACTION          CHOLECYSTECTOMY          COLECTOMY          CORONARY ANGIOPLASTY          EYE SURGERY          JOINT REPLACEMENT Right         hip replacement x 4    PARATHYROIDECTOMY          SHOULDER SURGERY Right       TOTAL KNEE ARTHROPLASTY Bilateral          Review of patient's allergies indicates:  No Known Allergies  No current facility-administered medications on file prior to encounter.                    Current Outpatient Prescriptions on File Prior to Encounter   Medication Sig Dispense Refill    amlodipine (NORVASC) 5 MG tablet TAKE 1 TABLET TWICE DAILY (Patient taking differently: Take 5 mg by mouth 2 (two) times daily. TAKE 1 TABLET TWICE DAILY) 180 tablet 3    atenolol (TENORMIN) 25 MG tablet Take 1 tablet (25 mg total) by mouth once daily. (Patient taking differently: Take 25 mg by mouth every evening. ) 90 tablet 3    benazepril (LOTENSIN) 20 MG tablet Take 1 tablet (20 mg total) by mouth 2 (two) times daily. 180 tablet 3    fluticasone (FLONASE) 50 mcg/actuation nasal spray 2 sprays by Each Nare route once daily. 1 Bottle 11    gabapentin (NEURONTIN) 300 MG capsule Take 1 capsule (300 mg total) by mouth 3 (three) times daily. 270 capsule 3    isosorbide mononitrate (IMDUR) 60 MG 24 hr tablet Take 1 tablet (60 mg total) by mouth 2 (two) times daily. 180 tablet 3    pravastatin (PRAVACHOL) 20 MG tablet Take 1 tablet (20 mg total) by mouth once daily. (Patient taking differently: Take 20 mg by mouth every evening. ) 90 tablet 4    sertraline (ZOLOFT) 100 MG tablet Take 1 tablet (100 mg total) by mouth every evening. 90 tablet 3    nebulizer accessories  "Kit Use with nebulizer 1 kit prn    nitroGLYCERIN (NITROSTAT) 0.4 MG SL tablet Place 1 tablet (0.4 mg total) under the tongue every 5 (five) minutes as needed for Chest pain. 60 tablet 12       BP (!) 104/47 (BP Location: Left arm, Patient Position: Lying, BP Method: Automatic)  Pulse 75  Temp 100 °F (37.8 °C) (Oral)  Resp (!) 1  Ht 5' 4" (1.626 m)  Wt 99.5 kg (219 lb 5.7 oz)  SpO2 (!) 90%  Breastfeeding? No  BMI 37.65 kg/m2           ROS:  GENERAL: No fever, chills, fatigability or weight loss.  SKIN: No rashes, itching or changes in color or texture of skin.  HEAD: No headaches or recent head trauma.  EYES: Visual acuity fine. No photophobia, ocular pain or diplopia.  EARS: Denies ear pain, discharge or vertigo.  NOSE: No loss of smell, no epistaxis or postnasal drip.  MOUTH & THROAT: No hoarseness or change in voice. No excessive gum bleeding.  NODES: Denies swollen glands.  CHEST: Denies COTTER, cyanosis, wheezing, cough and sputum production.  CARDIOVASCULAR: Denies chest pain, PND, orthopnea or reduced exercise tolerance.  ABDOMEN: Appetite fine. No weight loss. Denies diarrhea, abdominal pain, hematemesis or blood in stool.  URINARY: No flank pain, dysuria or hematuria.  PERIPHERAL VASCULAR: No claudication or cyanosis.  NEUROLOGIC: No history of seizures, paralysis, alteration of gait or coordination.  MUSCULOSKELETAL: See HPI      PE:  APPEARANCE: Well nourished, well developed, in no acute distress.   HEAD: Normocephalic, atraumatic.  EYES: PERRL. EOMI.   EARS: TM's intact. Light reflex normal. No retraction or perforation.   NOSE: Mucosa pink. Airway clear.  MOUTH & THROAT: No tonsillar enlargement. No pharyngeal erythema or exudate. No stridor.  NECK: Supple.   NODES: No cervical, axillary or inguinal lymph node enlargement.  CHEST: Lungs clear to auscultation.  CARDIOVASCULAR: Normal S1, S2. No rubs, murmurs or gallops.  ABDOMEN: Bowel sounds normal. Not distended. Soft. No tenderness or " masses.  NEUROLOGIC: Cranial Nerves: II-XII grossly intact, also see MUSCULOSKELETAL  MUSCULOSKELETAL:       Left Hip -dressing intact, 2 plus dorsalis pedis and posterior tibial artery pulses, light touch intact Left lower extremity. All digits are warm. No erythema, no warmth, no drainage, no swelling, no significant tenderness. Less than 2 seconds capillary refill all digits.          ASSESSMENT:   The patient's motiviation level remains low and she appears nonchalant about therapy.  I explained to her the importance of cooperating with PT and mobilizing to minimize  The risk of medical complications.   The patient is stable and improving.          PLAN:  Consult SNF  Continue pain medication  Continue physical therapy

## 2017-04-02 NOTE — PT/OT/SLP PROGRESS
Physical Therapy  Treatment    Maldonado Deloen   MRN: [id number]   Admitting Diagnosis: Arthritis of left hip    PT Received On: 17  PT Start Time: 1050     PT Stop Time: 1114    PT Total Time (min): 24 min       Billable Minutes:  Gait Training8 and Therapeutic Exercise 16    Treatment Type: Treatment  PT/PTA: PTA     PTA Visit Number: 1       General Precautions: Standard, fall  Orthopedic Precautions: LLE weight bearing as tolerated   Braces:           Subjective:  Communicated with epic and nurse Guzmán prior to session.      Pain Ratin/10  Location - Side: Left  Location - Orientation: generalized  Location: hip     Pain Rating Post-Intervention: 5/10    Objective:   Patient found with: oxygen, peripheral IV    Functional Mobility:  Bed Mobility:   Rolling/Turning to Left: Minimum assistance  Rolling/Turning Right: Minimum assistance  Scooting/Bridging: Minimum Assistance  Supine to Sit: Minimum Assistance    Transfers:  Sit <> Stand Assistance: Moderate Assistance  Sit <> Stand Assistive Device: Rolling Walker  Bed <> Chair Technique: Stand Pivot  Bed <> Chair Assistance: Minimum Assistance  Bed <> Chair Assistive Device: Rolling Walker    Gait:   Gait Distance: 6' bed to chair with RW  Assistance 1: Minimum assistance  Gait Assistive Device: Rolling walker  Gait Pattern: swing-to gait  Gait Deviation(s): decreased danish, decreased step length, decreased stride length    Stairs:      Balance:   Static Sit: FAIR+: Able to take MINIMAL challenges from all directions  Dynamic Sit: FAIR+: Maintains balance through MINIMAL excursions of active trunk motion  Static Stand: POOR: Needs MODERATE assist to maintain  Dynamic stand: POOR: N/A     Therapeutic Activities and Exercises:  Pt able to ambulate 6 feet to chair. Pt performed sit/stand and seated exercises.      AM-PAC 6 CLICK MOBILITY  How much help from another person does this patient currently need?   1 = Unable, Total/Dependent Assistance  2 = A lot,  Maximum/Moderate Assistance  3 = A little, Minimum/Contact Guard/Supervision  4 = None, Modified [place]/Independent         AM-PAC Raw Score CMS G-Code Modifier Level of Impairment Assistance   6 % Total / Unable   7 - 9 CM 80 - 100% Maximal Assist   10 - 14 CL 60 - 80% Moderate Assist   15 - 19 CK 40 - 60% Moderate Assist   20 - 22 CJ 20 - 40% Minimal Assist   23 CI 1-20% SBA / CGA   24 CH 0% Independent/ Mod I     Patient left up in chair with all lines intact and nurse notified.      Rehab identified problem list/impairments: Rehab identified problem list/impairments: weakness, impaired endurance, impaired self care skills, impaired functional mobilty, gait instability, impaired balance, decreased lower extremity function, decreased safety awareness    Rehab potential is good.    Activity tolerance: Fair    Discharge recommendations: Discharge Facility/Level Of Care Needs: nursing facility, skilled     Barriers to discharge: Barriers to Discharge: Decreased caregiver support    Equipment recommendations:       GOALS:   Physical Therapy Goals        Problem: Physical Therapy Goal    Goal Priority Disciplines Outcome Goal Variances Interventions   Physical Therapy Goal     PT/OT, PT Ongoing (interventions implemented as appropriate)     Description:  PT WILL BE SEEN FOR P.T. FOR A MIN OF 5 OUT OF 7 DAYS A WEEK  LT17  1. PT WILL COMPLETE BED MOBILITY WITH SBA.  2. PT WILL T/F TO CHAIR WITH RW AND MIN A  3. PT WILL GT TRAIN X 100' WITH RW AND MIN A  4. PT WILL COMPLETE B LE TE X 20 REPS FOR ROM AND STRENGTHENING.               PLAN:    Patient to be seen 5 x/week  to address the above listed problems via therapeutic exercises, therapeutic activities, gait training  Plan of Care expires:    Plan of Care reviewed with: patient         Román Julissa, PT  2017

## 2017-04-02 NOTE — PLAN OF CARE
Problem: Patient Care Overview  Goal: Plan of Care Review  PT REQUIRES MOD A FOR T/F TO CHAIR WBAT.   Outcome: Ongoing (interventions implemented as appropriate)  Remains injury free. Pain managed appropriately. Patient up to chair today, tolerated well. Ambulated several times to bedside commode. tolerated well, will monitor.

## 2017-04-03 LAB
POCT GLUCOSE: 129 MG/DL (ref 70–110)
POCT GLUCOSE: 130 MG/DL (ref 70–110)
POCT GLUCOSE: 136 MG/DL (ref 70–110)
POCT GLUCOSE: 140 MG/DL (ref 70–110)
POCT GLUCOSE: 145 MG/DL (ref 70–110)
POCT GLUCOSE: 158 MG/DL (ref 70–110)
POCT GLUCOSE: 165 MG/DL (ref 70–110)

## 2017-04-03 PROCEDURE — 25000003 PHARM REV CODE 250: Performed by: NURSE PRACTITIONER

## 2017-04-03 PROCEDURE — 99900035 HC TECH TIME PER 15 MIN (STAT)

## 2017-04-03 PROCEDURE — 63600175 PHARM REV CODE 636 W HCPCS: Performed by: NURSE PRACTITIONER

## 2017-04-03 PROCEDURE — 94640 AIRWAY INHALATION TREATMENT: CPT

## 2017-04-03 PROCEDURE — 27000221 HC OXYGEN, UP TO 24 HOURS

## 2017-04-03 PROCEDURE — 25000003 PHARM REV CODE 250: Performed by: ORTHOPAEDIC SURGERY

## 2017-04-03 PROCEDURE — 97530 THERAPEUTIC ACTIVITIES: CPT

## 2017-04-03 PROCEDURE — 97535 SELF CARE MNGMENT TRAINING: CPT

## 2017-04-03 PROCEDURE — 11000001 HC ACUTE MED/SURG PRIVATE ROOM

## 2017-04-03 RX ORDER — OXYCODONE HYDROCHLORIDE 5 MG/1
10 TABLET ORAL
Status: DISCONTINUED | OUTPATIENT
Start: 2017-04-03 | End: 2017-04-04 | Stop reason: HOSPADM

## 2017-04-03 RX ORDER — ACETAMINOPHEN 500 MG
1000 TABLET ORAL 3 TIMES DAILY
Status: DISCONTINUED | OUTPATIENT
Start: 2017-04-03 | End: 2017-04-04 | Stop reason: HOSPADM

## 2017-04-03 RX ADMIN — APIXABAN 2.5 MG: 2.5 TABLET, FILM COATED ORAL at 09:04

## 2017-04-03 RX ADMIN — IPRATROPIUM BROMIDE 2 SPRAY: 42 SPRAY NASAL at 12:04

## 2017-04-03 RX ADMIN — CLOPIDOGREL BISULFATE 75 MG: 75 TABLET ORAL at 08:04

## 2017-04-03 RX ADMIN — OXYCODONE HYDROCHLORIDE 10 MG: 5 TABLET ORAL at 11:04

## 2017-04-03 RX ADMIN — IPRATROPIUM BROMIDE 2 SPRAY: 42 SPRAY NASAL at 06:04

## 2017-04-03 RX ADMIN — ISOSORBIDE MONONITRATE 60 MG: 60 TABLET, EXTENDED RELEASE ORAL at 09:04

## 2017-04-03 RX ADMIN — IPRATROPIUM BROMIDE 2 SPRAY: 42 SPRAY NASAL at 11:04

## 2017-04-03 RX ADMIN — ARFORMOTEROL TARTRATE 15 MCG: 15 SOLUTION RESPIRATORY (INHALATION) at 08:04

## 2017-04-03 RX ADMIN — OXYCODONE HYDROCHLORIDE 10 MG: 5 TABLET ORAL at 09:04

## 2017-04-03 RX ADMIN — GABAPENTIN 300 MG: 300 CAPSULE ORAL at 02:04

## 2017-04-03 RX ADMIN — BENAZEPRIL HYDROCHLORIDE 20 MG: 20 TABLET, FILM COATED ORAL at 08:04

## 2017-04-03 RX ADMIN — ACETAMINOPHEN 1000 MG: 500 TABLET ORAL at 11:04

## 2017-04-03 RX ADMIN — ISOSORBIDE MONONITRATE 60 MG: 60 TABLET, EXTENDED RELEASE ORAL at 08:04

## 2017-04-03 RX ADMIN — GABAPENTIN 300 MG: 300 CAPSULE ORAL at 06:04

## 2017-04-03 RX ADMIN — PRAVASTATIN SODIUM 20 MG: 20 TABLET ORAL at 09:04

## 2017-04-03 RX ADMIN — APIXABAN 2.5 MG: 2.5 TABLET, FILM COATED ORAL at 08:04

## 2017-04-03 RX ADMIN — IPRATROPIUM BROMIDE 2 SPRAY: 42 SPRAY NASAL at 05:04

## 2017-04-03 RX ADMIN — ARFORMOTEROL TARTRATE 15 MCG: 15 SOLUTION RESPIRATORY (INHALATION) at 07:04

## 2017-04-03 RX ADMIN — SERTRALINE HYDROCHLORIDE 100 MG: 50 TABLET, FILM COATED ORAL at 09:04

## 2017-04-03 RX ADMIN — OXYCODONE HYDROCHLORIDE 10 MG: 5 TABLET ORAL at 06:04

## 2017-04-03 RX ADMIN — GABAPENTIN 300 MG: 300 CAPSULE ORAL at 09:04

## 2017-04-03 RX ADMIN — OXYCODONE HYDROCHLORIDE 10 MG: 5 TABLET ORAL at 03:04

## 2017-04-03 RX ADMIN — OXYCODONE HYDROCHLORIDE 10 MG: 5 TABLET ORAL at 07:04

## 2017-04-03 RX ADMIN — BENAZEPRIL HYDROCHLORIDE 20 MG: 20 TABLET, FILM COATED ORAL at 09:04

## 2017-04-03 RX ADMIN — ATENOLOL 25 MG: 25 TABLET ORAL at 09:04

## 2017-04-03 NOTE — PLAN OF CARE
Problem: Pressure Ulcer Risk (Addy Scale) (Adult,Obstetrics,Pediatric)  Goal: Skin Integrity  Patient will demonstrate the desired outcomes by discharge/transition of care.   Outcome: Ongoing (interventions implemented as appropriate)  Pain controlled with PRN PO pain meds. Pt ambulated to bedside commode. VSS.   Fall precautions in place. Side rails up. Bed locked and low in position. Call light and personal items within reach. 24 hour order chart check completed. Pt educated on medication's side effects. Pt verbalized understanding.   Will continue to monitor.

## 2017-04-03 NOTE — DISCHARGE SUMMARY
[facility] -   Discharge Summary      Admit Date: 3/29/2017    Discharge Date and Time: 4/03/2017    Attending Physician: Christopher Gutierrez Sr., MD     Reason for Admission: Left hip Arothroplasty    Procedures Performed: Procedure(s) (LRB):  ARTHROPLASTY-HIP (ANTERIOR APPROACH) (Left)  REMOVAL-HARDWARE-HIP (Left)  CAPSULOTOMY-JOINT (Left)  SYNOVECTOMY-HIP (Left)    Hospital Course (synopsis of major diagnoses, care, treatment, and services provided during the course of the hospital stay): None      Consults: Medicine, PT, OT    Significant Diagnostic Studies: Labs: All labs within the past 24 hours have been reviewed    Final Diagnoses:    Principal Problem: Arthritis of left hip   Secondary Diagnoses:   Active Hospital Problems    Diagnosis  POA    *Arthritis of left hip [M16.12]  Yes    Controlled type 2 diabetes mellitus without complication, without long-term current use of insulin [E11.9]  Yes    Hypertension [I10]  Yes    Carotid artery disease without cerebral infarction [I77.9]  Yes    Chronic obstructive pulmonary disease [J44.9]  Yes      Resolved Hospital Problems    Diagnosis Date Resolved POA   No resolved problems to display.       Discharged Condition: good    Disposition: [facility],     Follow Up/Patient Instructions:  Discharge to [facility], when accepted  Full weightbearing left lower extremity with walker assisted ambulation.  Change dressing prior to discharge, and then daily, apply gauze and duoderm.  Resume regular diet      Medications:  Reconciled Home Medications:   Current Discharge Medication List      CONTINUE these medications which have CHANGED    Details   oxycodone-acetaminophen (PERCOCET)  mg per tablet Take 1 tablet by mouth every 4 (four) hours as needed for Pain.  Qty: 90 tablet, Refills: 0         CONTINUE these medications which have NOT CHANGED    Details   albuterol-ipratropium 2.5mg-0.5mg/3mL  (DUO-NEB) 0.5 mg-3 mg(2.5 mg base)/3 mL nebulizer solution Take 3 mLs by nebulization every 6 (six) hours.  Qty: 120 vial, Refills: 12    Associated Diagnoses: Chronic obstructive pulmonary disease, unspecified COPD type      amlodipine (NORVASC) 5 MG tablet TAKE 1 TABLET TWICE DAILY  Qty: 180 tablet, Refills: 3      atenolol (TENORMIN) 25 MG tablet Take 1 tablet (25 mg total) by mouth once daily.  Qty: 90 tablet, Refills: 3    Associated Diagnoses: Essential hypertension; Atherosclerosis of native coronary artery without angina pectoris      benazepril (LOTENSIN) 20 MG tablet Take 1 tablet (20 mg total) by mouth 2 (two) times daily.  Qty: 180 tablet, Refills: 3      calcium carbonate (OS-RICHY) 600 mg (1,500 mg) Tab Take 600 mg by mouth once daily.      clopidogrel (PLAVIX) 75 mg tablet TAKE ONE TABLET BY MOUTH ONCE DAILY  Qty: 90 tablet, Refills: 2    Associated Diagnoses: Atherosclerosis of native coronary artery without angina pectoris, unspecified whether native or transplanted heart      fluticasone (FLONASE) 50 mcg/actuation nasal spray 2 sprays by Each Nare route once daily.  Qty: 1 Bottle, Refills: 11    Associated Diagnoses: Sinus congestion      fluticasone-vilanterol (BREO ELLIPTA) 200-25 mcg/dose DsDv diskus inhaler Inhale 1 puff into the lungs once daily. Controller  Qty: 60 each, Refills: 11    Associated Diagnoses: Chronic obstructive pulmonary disease, unspecified COPD type      gabapentin (NEURONTIN) 300 MG capsule Take 1 capsule (300 mg total) by mouth 3 (three) times daily.  Qty: 270 capsule, Refills: 3      isosorbide mononitrate (IMDUR) 60 MG 24 hr tablet Take 1 tablet (60 mg total) by mouth 2 (two) times daily.  Qty: 180 tablet, Refills: 3    Associated Diagnoses: AP (angina pectoris)      milk thistle 175 mg tablet Take 175 mg by mouth once daily.      multivitamin (ONE DAILY MULTIVITAMIN) per tablet Take 1 tablet by mouth once daily.      pravastatin (PRAVACHOL) 20 MG tablet Take 1 tablet (20  mg total) by mouth once daily.  Qty: 90 tablet, Refills: 4    Associated Diagnoses: Hyperlipidemia      sertraline (ZOLOFT) 100 MG tablet Take 1 tablet (100 mg total) by mouth every evening.  Qty: 90 tablet, Refills: 3      ipratropium (ATROVENT) 0.06 % nasal spray 2 sprays by Nasal route 4 (four) times daily. As needed for rhinitis  Qty: 15 mL, Refills: 11    Associated Diagnoses: Vasomotor rhinitis      nebulizer accessories Kit Use with nebulizer  Qty: 1 kit, Refills: prn    Associated Diagnoses: COPD (chronic obstructive pulmonary disease)      nitroGLYCERIN (NITROSTAT) 0.4 MG SL tablet Place 1 tablet (0.4 mg total) under the tongue every 5 (five) minutes as needed for Chest pain.  Qty: 60 tablet, Refills: 12         STOP taking these medications       metformin (GLUCOPHAGE) 500 MG tablet Comments:   Reason for Stopping:         aspirin (ECOTRIN) 81 MG EC tablet Comments:   Reason for Stopping:             No discharge procedures on file.  Follow-up Information     Schedule an appointment as soon as possible for a visit with Christopher Gutierrez Sr, MD.    Specialty:  Orthopedic Surgery    Why:  As needed, If symptoms worsen, For suture removal, For wound re-check    Contact information:    [address] [address] [phone]

## 2017-04-03 NOTE — PLAN OF CARE
Problem: Patient Care Overview  Goal: Plan of Care Review  PT REQUIRES MOD A FOR T/F TO CHAIR WBAT.   Outcome: Ongoing (interventions implemented as appropriate)  PT REQUIRES CGA FOR GT X 3' WITH RW

## 2017-04-03 NOTE — PT/OT/SLP PROGRESS
Occupational Therapy  Treatment    Maldonado Deleon   MRN: [id number]   Admitting Diagnosis: Arthritis of left hip    OT Date of Treatment: 17   OT Start Time:   OT Stop Time:   OT Total Time (min): 20 min    Billable Minutes:  Self Care/Home Management  x 20 min    General Precautions: Standard, fall  Orthopedic Precautions: LLE weight bearing as tolerated  Braces:           Subjective:  Communicated with pt and nurseGabino Aragon prior to session.      Pain Ratin/10  Location - Side: Left     Location: hip  Pain Addressed: Pre-medicate for activity, Reposition, Distraction       Objective:  Patient found with: peripheral IV     Functional Mobility:  Bed Mobility:  Rolling/Turning Right: Stand by assistance  Scooting/Bridging: Stand by Assistance  Supine to Sit: Minimum Assistance (min A for left leg)  Sit to Supine: Minimum Assistance (assist for LLE)    Transfers:   Sit <> Stand Assistance: Contact Guard Assistance  Sit <> Stand Assistive Device: No Assistive Device  Toilet Transfer Technique: Stand Pivot  Toilet Transfer Assistance: Minimum Assistance  Toilet Transfer Assistive Device: bedside commode    Functional Ambulation:     Activities of Daily Living:     Feeding adaptive equipment:      UE adaptive equipment:      LE adaptive equipment:         Toileting Where Assessed: Bedside commode  Toileting Level of Assistance: Minimum assistance (toileting hygiene only)        Bathing adaptive equipment:     Balance:   Static Sit: GOOD: Takes MODERATE challenges from all directions  Dynamic Sit: FAIR+: Maintains balance through MINIMAL excursions of active trunk motion  Static Stand: FAIR: Maintains without assist but unable to take challenges  Dynamic stand: FAIR: Needs CONTACT GUARD during gait    Therapeutic Activities and Exercises:  Pt seen in room, bed mobility with min A mainly for LLE, toilet transfer to [facility] with min A, toileting with min A, requested to return to bed due to left hip pain and  refused other therapeutic interventions at this time.     AM-PAC 6 CLICK ADL   How much help from another person does this patient currently need?   1 = Unable, Total/Dependent Assistance  2 = A lot, Maximum/Moderate Assistance  3 = A little, Minimum/Contact Guard/Supervision  4 = None, Modified [place]/Independent          AM-PAC Raw Score CMS G-Code Modifier Level of Impairment Assistance   6 % Total / Unable   7 - 9 CM 80 - 100% Maximal Assist   10 - 14 CL 60 - 80% Moderate Assist   15 - 19 CK 40 - 60% Moderate Assist   20 - 22 CJ 20 - 40% Minimal Assist   23 CI 1-20% SBA / CGA   24 CH 0% Independent/ Mod I     Patient left supine with all lines intact and call button in reach    ASSESSMENT:  Maldonado Deleon is a 81 y.o. female with a medical diagnosis of Arthritis of left hip and presents with impaired self care skills and fx mobility.    Rehab identified problem list/impairments: Rehab identified problem list/impairments: impaired endurance, impaired self care skills, impaired functional mobilty, decreased coordination, impaired balance, gait instability, decreased ROM, decreased safety awareness    Rehab potential is good.    Activity tolerance: Fair    Discharge recommendations: Discharge Facility/Level Of Care Needs: nursing facility, skilled     Barriers to discharge: Barriers to Discharge: Decreased caregiver support    Equipment recommendations: none     GOALS:   Occupational Therapy Goals        Problem: Occupational Therapy Goal    Goal Priority Disciplines Outcome Interventions   Occupational Therapy Goal     OT, PT/OT Ongoing (interventions implemented as appropriate)    Description:  OT GOALS TO BE MET BY 4-6-17  1. PT WILL REQ MIN A WITH AE FOR LE DRESSING  2. SBA WITH UE DRESSING  3. PT WILL TOLERATE 1 SET X 10 REPS B UE ROM EXERCISE WITH SIMPLE ROM IN ALL AVAILABLE PAIN-FREE RANGE OR PLANES              Plan:  Patient to be seen 3 x/week to address the above listed problems via  self-care/home management, therapeutic activities, therapeutic exercises  Plan of Care expires: 04/06/17  Plan of Care reviewed with: patient         Lupe Marquez, OT  04/03/2017

## 2017-04-03 NOTE — PLAN OF CARE
Problem: Patient Care Overview  Goal: Plan of Care Review  PT REQUIRES MOD A FOR T/F TO CHAIR WBAT.   Outcome: Ongoing (interventions implemented as appropriate)  Reviewed plan of care. Patient verbalized understanding and teach back.     12hr chart check complete.

## 2017-04-03 NOTE — PLAN OF CARE
Notified by nursing that Dr Gutierrez called and said to discharge patient to [facility]. I asked he had spoken with the patient. He indicated that he had not due to surgery . Contacted Chan Dominguez Liaison with [facility] he stated that he has not received a denial from [employer]. He will check into the denial and call me back. Contacted Kaylee Quintero Admissions Director at [facility] she stated that she has no beds available . She may have a discharge on Wednesday but she has 2 on a waiting list.       @ 1630 Met with patient discussed being denied rehab referral. Patient stated that she knew that Dr Gutierrez was working with her insurance and the rehab to discharge her. Discussed options for receiving skilled services. Preference letter presented, signed and placed in chart. Contacted Kaylee Quintero @ [facility]. Referral faxed to [facility] via [facility].

## 2017-04-03 NOTE — PROGRESS NOTES
SUBJECTIVE:  Patient is status post Left JOHN.  Patient complains of 5/ 10 pain that is better with the pain meds and aggravated with movement.   The patient is alert and oriented times three. She looks very  Comfortable.                    Past Surgical History:   Procedure Laterality Date    ABDOMINAL SURGERY          BACK SURGERY            x 2    CATARACT EXTRACTION          CHOLECYSTECTOMY          COLECTOMY          CORONARY ANGIOPLASTY          EYE SURGERY          JOINT REPLACEMENT Right         hip replacement x 4    PARATHYROIDECTOMY          SHOULDER SURGERY Right       TOTAL KNEE ARTHROPLASTY Bilateral          Review of patient's allergies indicates:  No Known Allergies  No current facility-administered medications on file prior to encounter.                         Current Outpatient Prescriptions on File Prior to Encounter   Medication Sig Dispense Refill    amlodipine (NORVASC) 5 MG tablet TAKE 1 TABLET TWICE DAILY (Patient taking differently: Take 5 mg by mouth 2 (two) times daily. TAKE 1 TABLET TWICE DAILY) 180 tablet 3    atenolol (TENORMIN) 25 MG tablet Take 1 tablet (25 mg total) by mouth once daily. (Patient taking differently: Take 25 mg by mouth every evening. ) 90 tablet 3    benazepril (LOTENSIN) 20 MG tablet Take 1 tablet (20 mg total) by mouth 2 (two) times daily. 180 tablet 3    fluticasone (FLONASE) 50 mcg/actuation nasal spray 2 sprays by Each Nare route once daily. 1 Bottle 11    gabapentin (NEURONTIN) 300 MG capsule Take 1 capsule (300 mg total) by mouth 3 (three) times daily. 270 capsule 3    isosorbide mononitrate (IMDUR) 60 MG 24 hr tablet Take 1 tablet (60 mg total) by mouth 2 (two) times daily. 180 tablet 3    pravastatin (PRAVACHOL) 20 MG tablet Take 1 tablet (20 mg total) by mouth once daily. (Patient taking differently: Take 20 mg by mouth every evening. ) 90 tablet 4    sertraline (ZOLOFT) 100 MG tablet Take 1 tablet (100 mg total) by mouth every evening.  "90 tablet 3    nebulizer accessories Kit Use with nebulizer 1 kit prn    nitroGLYCERIN (NITROSTAT) 0.4 MG SL tablet Place 1 tablet (0.4 mg total) under the tongue every 5 (five) minutes as needed for Chest pain. 60 tablet 12       BP (!) 104/47 (BP Location: Left arm, Patient Position: Lying, BP Method: Automatic)  Pulse 75  Temp 100 °F (37.8 °C) (Oral)  Resp (!) 1  Ht 5' 4" (1.626 m)  Wt 99.5 kg (219 lb 5.7 oz)  SpO2 (!) 90%  Breastfeeding? No  BMI 37.65 kg/m2           ROS:  GENERAL: No fever, chills, fatigability or weight loss.  SKIN: No rashes, itching or changes in color or texture of skin.  HEAD: No headaches or recent head trauma.  EYES: Visual acuity fine. No photophobia, ocular pain or diplopia.  EARS: Denies ear pain, discharge or vertigo.  NOSE: No loss of smell, no epistaxis or postnasal drip.  MOUTH & THROAT: No hoarseness or change in voice. No excessive gum bleeding.  NODES: Denies swollen glands.  CHEST: Denies COTTER, cyanosis, wheezing, cough and sputum production.  CARDIOVASCULAR: Denies chest pain, PND, orthopnea or reduced exercise tolerance.  ABDOMEN: Appetite fine. No weight loss. Denies diarrhea, abdominal pain, hematemesis or blood in stool.  URINARY: No flank pain, dysuria or hematuria.  PERIPHERAL VASCULAR: No claudication or cyanosis.  NEUROLOGIC: No history of seizures, paralysis, alteration of gait or coordination.  MUSCULOSKELETAL: See HPI      PE:  APPEARANCE: Well nourished, well developed, in no acute distress.   HEAD: Normocephalic, atraumatic.  EYES: PERRL. EOMI.   EARS: TM's intact. Light reflex normal. No retraction or perforation.   NOSE: Mucosa pink. Airway clear.  MOUTH & THROAT: No tonsillar enlargement. No pharyngeal erythema or exudate. No stridor.  NECK: Supple.   NODES: No cervical, axillary or inguinal lymph node enlargement.  CHEST: Lungs clear to auscultation.  CARDIOVASCULAR: Normal S1, S2. No rubs, murmurs or gallops.  ABDOMEN: Bowel sounds normal. Not " distended. Soft. No tenderness or masses.  NEUROLOGIC: Cranial Nerves: II-XII grossly intact, also see MUSCULOSKELETAL  MUSCULOSKELETAL:       Left Hip -dressing intact, 2 plus dorsalis pedis and posterior tibial artery pulses, light touch intact Left lower extremity. All digits are warm. No erythema, no warmth, no drainage, no swelling, no significant tenderness. Less than 2 seconds capillary refill all digits.          ASSESSMENT:   The patient's motiviation level remains low and she appears nonchalant about therapy.  I explained to her the importance of cooperating with PT and mobilizing to minimize  The risk of medical complications.   The patient is stable and improving.          PLAN:  Consult SNF  Continue pain medication  Continue physical therapy

## 2017-04-03 NOTE — PROGRESS NOTES
I spoke with Dr. Berenice Crespo, who states that the patient is approved for SNF, at [facility].    Christopher Gutierrez MD

## 2017-04-03 NOTE — PT/OT/SLP PROGRESS
Physical Therapy  Treatment    Maldonado Deleon   MRN: [id number]   Admitting Diagnosis: Arthritis of left hip    PT Received On: 17  PT Start Time: 1012     PT Stop Time: 1035    PT Total Time (min): 23 min       Billable Minutes:  Therapeutic Activity 23    Treatment Type: Treatment  PT/PTA: PT     PTA Visit Number: 1       General Precautions: Standard, fall  Orthopedic Precautions: LLE weight bearing as tolerated   Braces:           Subjective:  Communicated with NURSE PANDYA AND EPIC CHART REVIEW  prior to session.   PT AGREED TO TX     Pain Ratin/10  Location - Side: Left     Location: hip     Pain Rating Post-Intervention: 5/10    Objective:   Patient found with: oxygen, peripheral IV    Functional Mobility:  Bed Mobility:   Rolling/Turning Right: Stand by assistance  Scooting/Bridging: Stand by Assistance  Supine to Sit: Stand by Assistance    Transfers:  Sit <> Stand Assistance: Contact Guard Assistance  Sit <> Stand Assistive Device: Rolling Walker  Bed <> Chair Technique: Stand Pivot  Bed <> Chair Assistance: Contact Guard Assistance  Bed <> Chair Assistive Device: Rolling Walker  Toilet Transfer Technique: Stand Pivot  Toilet Transfer Assistance: Contact Guard Assistance    Gait:   Gait Distance: PT GT TRAINED X 3' WITH RW.  Assistance 1: Contact Guard Assistance  Gait Assistive Device: Rolling walker  Gait Pattern: swing-to gait  Gait Deviation(s): decreased danish, decreased weight-shifting ability    Therapeutic Activities and Exercises:  PT T/F TO BSC FOR TOILETING AND NEEDED CGA FOR T/F AND SETUP FOR CLEANING SELF. PT GT TRAINED TO CHAIR WITH RW AND REFUSED TO PROGRESS GT. PT LEFT SEATED INCHAIR WITH CALL BELL IN REACH AND ALL NEEDS MET      AM-PAC 6 CLICK MOBILITY  How much help from another person does this patient currently need?   1 = Unable, Total/Dependent Assistance  2 = A lot, Maximum/Moderate Assistance  3 = A little, Minimum/Contact Guard/Supervision  4 = None, Modified  [place]/Independent         AM-PAC Raw Score CMS G-Code Modifier Level of Impairment Assistance   6 % Total / Unable   7 - 9 CM 80 - 100% Maximal Assist   10 - 14 CL 60 - 80% Moderate Assist   15 - 19 CK 40 - 60% Moderate Assist   20 - 22 CJ 20 - 40% Minimal Assist   23 CI 1-20% SBA / CGA   24 CH 0% Independent/ Mod I     Patient left up in chair with call button in reach.    Assessment:  PT DU MIN TX . PT REFUSING TO PROGRESS GT.     Rehab identified problem list/impairments: Rehab identified problem list/impairments: weakness, impaired endurance, impaired sensation, impaired self care skills, impaired functional mobilty, gait instability, impaired balance, pain, decreased safety awareness, decreased lower extremity function, decreased upper extremity function, decreased ROM    Rehab potential is good.    Activity tolerance: Poor    Discharge recommendations: Discharge Facility/Level Of Care Needs: nursing facility, skilled     Barriers to discharge: Barriers to Discharge: Decreased caregiver support    Equipment recommendations: Equipment Needed After Discharge: none     GOALS:   Physical Therapy Goals        Problem: Physical Therapy Goal    Goal Priority Disciplines Outcome Goal Variances Interventions   Physical Therapy Goal     PT/OT, PT Ongoing (interventions implemented as appropriate)     Description:  PT WILL BE SEEN FOR P.T. FOR A MIN OF 5 OUT OF 7 DAYS A WEEK  LT17  1. PT WILL COMPLETE BED MOBILITY WITH SBA.  2. PT WILL T/F TO CHAIR WITH RW AND MIN A  3. PT WILL GT TRAIN X 100' WITH RW AND MIN A  4. PT WILL COMPLETE B LE TE X 20 REPS FOR ROM AND STRENGTHENING.               PLAN:    Patient to be seen 5 x/week  to address the above listed problems via therapeutic exercises, therapeutic activities, gait training  Plan of Care expires:    Plan of Care reviewed with: patient         Nova Demarco, PT  2017

## 2017-04-04 VITALS
RESPIRATION RATE: 18 BRPM | HEART RATE: 62 BPM | WEIGHT: 219.38 LBS | SYSTOLIC BLOOD PRESSURE: 134 MMHG | DIASTOLIC BLOOD PRESSURE: 68 MMHG | TEMPERATURE: 98 F | OXYGEN SATURATION: 96 % | HEIGHT: 64 IN | BODY MASS INDEX: 37.45 KG/M2

## 2017-04-04 LAB
POCT GLUCOSE: 100 MG/DL (ref 70–110)
POCT GLUCOSE: 115 MG/DL (ref 70–110)

## 2017-04-04 PROCEDURE — 63600175 PHARM REV CODE 636 W HCPCS: Performed by: NURSE PRACTITIONER

## 2017-04-04 PROCEDURE — 25000003 PHARM REV CODE 250: Performed by: NURSE PRACTITIONER

## 2017-04-04 PROCEDURE — 27000221 HC OXYGEN, UP TO 24 HOURS

## 2017-04-04 PROCEDURE — 97530 THERAPEUTIC ACTIVITIES: CPT

## 2017-04-04 PROCEDURE — 94640 AIRWAY INHALATION TREATMENT: CPT

## 2017-04-04 PROCEDURE — 25000003 PHARM REV CODE 250: Performed by: ORTHOPAEDIC SURGERY

## 2017-04-04 PROCEDURE — 97116 GAIT TRAINING THERAPY: CPT

## 2017-04-04 RX ORDER — OXYCODONE HYDROCHLORIDE 15 MG/1
15 TABLET ORAL EVERY 4 HOURS PRN
Refills: 0
Start: 2017-04-04 | End: 2017-04-21 | Stop reason: ALTCHOICE

## 2017-04-04 RX ORDER — RAMELTEON 8 MG/1
8 TABLET ORAL NIGHTLY PRN
Start: 2017-04-04 | End: 2017-07-18

## 2017-04-04 RX ADMIN — BENAZEPRIL HYDROCHLORIDE 20 MG: 20 TABLET, FILM COATED ORAL at 09:04

## 2017-04-04 RX ADMIN — IPRATROPIUM BROMIDE 2 SPRAY: 42 SPRAY NASAL at 06:04

## 2017-04-04 RX ADMIN — ARFORMOTEROL TARTRATE 15 MCG: 15 SOLUTION RESPIRATORY (INHALATION) at 08:04

## 2017-04-04 RX ADMIN — OXYCODONE HYDROCHLORIDE 10 MG: 5 TABLET ORAL at 09:04

## 2017-04-04 RX ADMIN — IPRATROPIUM BROMIDE 2 SPRAY: 42 SPRAY NASAL at 11:04

## 2017-04-04 RX ADMIN — APIXABAN 2.5 MG: 2.5 TABLET, FILM COATED ORAL at 09:04

## 2017-04-04 RX ADMIN — ISOSORBIDE MONONITRATE 60 MG: 60 TABLET, EXTENDED RELEASE ORAL at 09:04

## 2017-04-04 RX ADMIN — GABAPENTIN 300 MG: 300 CAPSULE ORAL at 01:04

## 2017-04-04 RX ADMIN — ACETAMINOPHEN 1000 MG: 500 TABLET ORAL at 01:04

## 2017-04-04 RX ADMIN — ACETAMINOPHEN 1000 MG: 500 TABLET ORAL at 06:04

## 2017-04-04 RX ADMIN — OXYCODONE HYDROCHLORIDE 10 MG: 5 TABLET ORAL at 06:04

## 2017-04-04 RX ADMIN — GABAPENTIN 300 MG: 300 CAPSULE ORAL at 06:04

## 2017-04-04 RX ADMIN — CLOPIDOGREL BISULFATE 75 MG: 75 TABLET ORAL at 09:04

## 2017-04-04 NOTE — PROGRESS NOTES
SUBJECTIVE:  Patient is status post Left JOHN.  Patient complains of 4/ 10 pain that is better with the pain meds and aggravated with movement.   The patient is alert and oriented times three. She looks very Comfortable.                    Past Surgical History:   Procedure Laterality Date    ABDOMINAL SURGERY          BACK SURGERY            x 2    CATARACT EXTRACTION          CHOLECYSTECTOMY          COLECTOMY          CORONARY ANGIOPLASTY          EYE SURGERY          JOINT REPLACEMENT Right         hip replacement x 4    PARATHYROIDECTOMY          SHOULDER SURGERY Right       TOTAL KNEE ARTHROPLASTY Bilateral          Review of patient's allergies indicates:  No Known Allergies  No current facility-administered medications on file prior to encounter.                         Current Outpatient Prescriptions on File Prior to Encounter   Medication Sig Dispense Refill    amlodipine (NORVASC) 5 MG tablet TAKE 1 TABLET TWICE DAILY (Patient taking differently: Take 5 mg by mouth 2 (two) times daily. TAKE 1 TABLET TWICE DAILY) 180 tablet 3    atenolol (TENORMIN) 25 MG tablet Take 1 tablet (25 mg total) by mouth once daily. (Patient taking differently: Take 25 mg by mouth every evening. ) 90 tablet 3    benazepril (LOTENSIN) 20 MG tablet Take 1 tablet (20 mg total) by mouth 2 (two) times daily. 180 tablet 3    fluticasone (FLONASE) 50 mcg/actuation nasal spray 2 sprays by Each Nare route once daily. 1 Bottle 11    gabapentin (NEURONTIN) 300 MG capsule Take 1 capsule (300 mg total) by mouth 3 (three) times daily. 270 capsule 3    isosorbide mononitrate (IMDUR) 60 MG 24 hr tablet Take 1 tablet (60 mg total) by mouth 2 (two) times daily. 180 tablet 3    pravastatin (PRAVACHOL) 20 MG tablet Take 1 tablet (20 mg total) by mouth once daily. (Patient taking differently: Take 20 mg by mouth every evening. ) 90 tablet 4    sertraline (ZOLOFT) 100 MG tablet Take 1 tablet (100 mg total) by mouth every evening. 90  "tablet 3    nebulizer accessories Kit Use with nebulizer 1 kit prn    nitroGLYCERIN (NITROSTAT) 0.4 MG SL tablet Place 1 tablet (0.4 mg total) under the tongue every 5 (five) minutes as needed for Chest pain. 60 tablet 12       BP (!) 104/47 (BP Location: Left arm, Patient Position: Lying, BP Method: Automatic)  Pulse 75  Temp 100 °F (37.8 °C) (Oral)  Resp (!) 1  Ht 5' 4" (1.626 m)  Wt 99.5 kg (219 lb 5.7 oz)  SpO2 (!) 90%  Breastfeeding? No  BMI 37.65 kg/m2           ROS:  GENERAL: No fever, chills, fatigability or weight loss.  SKIN: No rashes, itching or changes in color or texture of skin.  HEAD: No headaches or recent head trauma.  EYES: Visual acuity fine. No photophobia, ocular pain or diplopia.  EARS: Denies ear pain, discharge or vertigo.  NOSE: No loss of smell, no epistaxis or postnasal drip.  MOUTH & THROAT: No hoarseness or change in voice. No excessive gum bleeding.  NODES: Denies swollen glands.  CHEST: Denies COTTER, cyanosis, wheezing, cough and sputum production.  CARDIOVASCULAR: Denies chest pain, PND, orthopnea or reduced exercise tolerance.  ABDOMEN: Appetite fine. No weight loss. Denies diarrhea, abdominal pain, hematemesis or blood in stool.  URINARY: No flank pain, dysuria or hematuria.  PERIPHERAL VASCULAR: No claudication or cyanosis.  NEUROLOGIC: No history of seizures, paralysis, alteration of gait or coordination.  MUSCULOSKELETAL: See HPI      PE:  APPEARANCE: Well nourished, well developed, in no acute distress.   HEAD: Normocephalic, atraumatic.  EYES: PERRL. EOMI.   EARS: TM's intact. Light reflex normal. No retraction or perforation.   NOSE: Mucosa pink. Airway clear.  MOUTH & THROAT: No tonsillar enlargement. No pharyngeal erythema or exudate. No stridor.  NECK: Supple.   NODES: No cervical, axillary or inguinal lymph node enlargement.  CHEST: Lungs clear to auscultation.  CARDIOVASCULAR: Normal S1, S2. No rubs, murmurs or gallops.  ABDOMEN: Bowel sounds normal. Not " distended. Soft. No tenderness or masses.  NEUROLOGIC: Cranial Nerves: II-XII grossly intact, also see MUSCULOSKELETAL  MUSCULOSKELETAL:       Left Hip -dressing intact, 2 plus dorsalis pedis and posterior tibial artery pulses, light touch intact Left lower extremity. All digits are warm. No erythema, no warmth, no drainage, no swelling, no significant tenderness. Less than 2 seconds capillary refill all digits.          ASSESSMENT:   The patient's motiviation level remains low and she appears nonchalant about therapy.  I explained to her the importance of cooperating with PT and mobilizing to minimize  The risk of medical complications.   The patient is stable and improving.          PLAN:  Awaiting SNF bed  Continue pain medication  Continue physical therapy

## 2017-04-04 NOTE — PLAN OF CARE
Problem: Patient Care Overview  Goal: Plan of Care Review  PT REQUIRES MOD A FOR T/F TO CHAIR WBAT.   Outcome: Outcome(s) achieved Date Met:  04/04/17  Injury free this hospital stay. Pain controlled. Dressing changed per order. VSS. Adequate for dc to brg.

## 2017-04-04 NOTE — PLAN OF CARE
04/04/17 1112   Final Note   Assessment Type Final Discharge Note   Discharge Disposition SNF  ([facility])

## 2017-04-04 NOTE — PLAN OF CARE
Problem: Patient Care Overview  Goal: Plan of Care Review  PT REQUIRES MOD A FOR T/F TO CHAIR WBAT.   Outcome: Ongoing (interventions implemented as appropriate)  Fall precautions in place. Side rails up. Bed locked and low in position. Call light and personal items within reach. 24 hour order chart check completed. Pt educated on medication's side effects. Pt verbalized understanding.   Will continue to monitor.

## 2017-04-04 NOTE — PLAN OF CARE
Call received from Kaylee Quintero RN Admissions Director at [facility]. She has 1 bed that opened today. Accepted bed for patient. Discussed with patient being discharged to [facility] today. She would prefer her daughter transporting her to [facility]. Update to Lesley chkathryn nurse who is sending a spok message to Dr Gutierrez requesting discharge orders.  Patient to discharge to [facility] today.

## 2017-04-04 NOTE — PT/OT/SLP PROGRESS
Physical Therapy  Treatment    Maldonado Deleon   MRN: [id number]   Admitting Diagnosis: Arthritis of left hip    PT Received On: 17  PT Start Time: 906     PT Stop Time: 930    PT Total Time (min): 24 min       Billable Minutes:  Gait [id number] and Therapeutic Activity 10    Treatment Type: Treatment  PT/PTA: PT     PTA Visit Number: 1       General Precautions: Standard, fall  Orthopedic Precautions: LLE weight bearing as tolerated   Braces:           Subjective:  Communicated with NURSE TONE AND [facility] CHART REVIEW prior to session.   PT AGREED TO TX     Pain Ratin/10  Location - Side: Left     Location: hip     Pain Rating Post-Intervention: 5/10    Objective:   Patient found with: peripheral IV    Functional Mobility:  Bed Mobility:   Rolling/Turning Right: Stand by assistance  Scooting/Bridging: Stand by Assistance  Supine to Sit: Stand by Assistance  Sit to Supine: Stand by Assistance    Transfers:  Sit <> Stand Assistance: Stand By Assistance  Sit <> Stand Assistive Device: Rolling Walker  Bed <> Chair Technique: Stand Pivot  Bed <> Chair Assistance: Stand By Assistance  Bed <> Chair Assistive Device: Rolling Walker    Gait:   Gait Distance: PT GT TRAINED WITH RW X 20' WITH STEP TO GT.  Assistance 1: Stand by Assistance  Gait Assistive Device: Rolling walker  Gait Pattern: swing-to gait  Gait Deviation(s): decreased danish, decreased weight-shifting ability      Therapeutic Activities and Exercises:  PT COMPLETED 10 REPS OF AP, TKE, AND MIP. PT TO [facility] FOR TOILETING WITH SBA. PT T/F TO BEDSIDE CHAIR WITH SBA AND CUES FOR SAFETY WITH RW. PT LEFT SEATED WITH ALL NEEDS MET      AM-PAC 6 CLICK MOBILITY  How much help from another person does this patient currently need?   1 = Unable, Total/Dependent Assistance  2 = A lot, Maximum/Moderate Assistance  3 = A little, Minimum/Contact Guard/Supervision  4 = None, Modified [place]/Independent         AM-PAC Raw Score CMS G-Code Modifier Level of  Impairment Assistance   6 % Total / Unable   7 - 9 CM 80 - 100% Maximal Assist   10 - 14 CL 60 - 80% Moderate Assist   15 - 19 CK 40 - 60% Moderate Assist   20 - 22 CJ 20 - 40% Minimal Assist   23 CI 1-20% SBA / CGA   24 CH 0% Independent/ Mod I     Patient left up in chair with call button in reach.    Assessment:  PT PROGRESSING WITH GT.     Rehab identified problem list/impairments: Rehab identified problem list/impairments: weakness, impaired endurance, decreased lower extremity function, impaired functional mobilty, gait instability, impaired balance, pain, decreased safety awareness    Rehab potential is good.    Activity tolerance: Fair    Discharge recommendations: Discharge Facility/Level Of Care Needs: nursing facility, skilled     Barriers to discharge: Barriers to Discharge: Decreased caregiver support    Equipment recommendations: Equipment Needed After Discharge: none     GOALS:   Physical Therapy Goals     Not on file      Multidisciplinary Problems (Resolved)        Problem: Physical Therapy Goal    Goal Priority Disciplines Outcome Goal Variances Interventions   Physical Therapy Goal   (Resolved)     PT/OT, PT Outcome(s) achieved     Description:  PT WILL BE SEEN FOR P.T. FOR A MIN OF 5 OUT OF 7 DAYS A WEEK  LT17  1. PT WILL COMPLETE BED MOBILITY WITH SBA.  2. PT WILL T/F TO CHAIR WITH RW AND MIN A  3. PT WILL GT TRAIN X 100' WITH RW AND MIN A  4. PT WILL COMPLETE B LE TE X 20 REPS FOR ROM AND STRENGTHENING.               PLAN:    Patient to be seen 5 x/week  to address the above listed problems via gait training, therapeutic activities, therapeutic exercises  Plan of Care expires: 17  Plan of Care reviewed with: patient         Noav Demarco, PT  2017

## 2017-04-04 NOTE — PLAN OF CARE
Per patient she has change her mind and would like [facility]. No discharge orders noted. Sent 2nd spok message to Dr Gutierrez requesting discharge orders and avs if patient is medically ready for discharge.      @ 1318 Lesley gracia nurse contacted Dr Gutierrez requesting discharge orders. [facility] request faxed to [phone].request is for a  of 1430 .[facility] for placed on blue chart.plan      @ 1430 Corrected orders obtained , faxed to [facility]. Contacted Kaylee Quintero RN Admissions Director, she has rececived orders. Contacted [facility] dispatch , José Antonio stated that the  is pulling into our facility now. Lesley Garcia nurse updated

## 2017-04-04 NOTE — PLAN OF CARE
04/04/17 1112   Discharge Reassessment   Assessment Type Discharge Planning Reassessment   Can the patient answer the patient profile reliably? Yes, cognitively intact   How does the patient rate their overall health at the present time? Good   Describe the patient's ability to walk at the present time. Walks with the help of equipment   How often would a person be available to care for the patient? Whenever needed   Number of comorbid conditions (as recorded on the chart) Five or more   During the past month, has the patient often been bothered by feeling down, depressed or hopeless? No   During the past month, has the patient often been bothered by little interest or pleasure in doing things? No   Discharge plan remains the same: No   Provided patient/caregiver education on the expected discharge date and the discharge plan Yes   Discharge Plan A Skilled Nursing Facility   Change in patient condition or support system No   Patient choice form signed by patient/caregiver Yes   Explained to the the patient/caregiver why the discharge planned changed: Yes   Involved the patient/caregiver in establishing a new discharge plan: Yes

## 2017-04-04 NOTE — PLAN OF CARE
03/30/17 1545   Discharge Assessment   Assessment Type Discharge Planning Assessment   Confirmed/corrected address and phone number on facesheet? Yes   Assessment information obtained from? Patient;Caregiver;Medical Record   Expected Length of Stay (days) (1-2 days)   Communicated expected length of stay with patient/caregiver yes   Type of Healthcare Directive Received Other (Comment)  (declines information)   Prior to hospitilization cognitive status: Alert/Oriented   Prior to hospitalization functional status: Assistive Equipment   Current cognitive status: Alert/Oriented   Current Functional Status: Assistive Equipment;Needs Assistance;Partially Dependent   Arrived From home or self-care   Lives With child(kanwal), adult   Able to Return to Prior Arrangements unable to determine at this time (comments)   Is patient able to care for self after discharge? Unable to determine at this time (comments)   How many people do you have in your home that can help with your care after discharge? 1   Who are your caregiver(s) and their phone number(s)? Kristy Monroy ( daughter ) [phone]   Patient's perception of discharge disposition rehab facility   Readmission Within The Last 30 Days no previous admission in last 30 days   Patient currently being followed by outpatient case management? No   Patient currently receives home health services? No   Does the patient currently use HME? Yes   Patient currently receives private duty nursing? No   Patient currently receives any other outside agency services? No   Equipment Currently Used at Home oxygen;walker, rolling;bedside commode;shower chair   Do you have any problems affording any of your prescribed medications? No   Is the patient taking medications as prescribed? yes   Do you have any financial concerns preventing you from receiving the healthcare you need? No   Does the patient have transportation to healthcare appointments? Yes   Transportation Available family or  friend will provide;car   On Dialysis? No   Does the patient receive services at the [facility]? No   Are there any open cases? No   Discharge Plan A Rehab   Discharge Plan B Skilled Nursing Facility   Patient/Family In Agreement With Plan yes

## 2017-04-05 ENCOUNTER — PATIENT OUTREACH (OUTPATIENT)
Dept: ADMINISTRATIVE | Facility: CLINIC | Age: 82
End: 2017-04-05
Payer: MEDICARE

## 2017-04-06 ENCOUNTER — DOCUMENTATION ONLY (OUTPATIENT)
Dept: ORTHOPEDICS | Facility: CLINIC | Age: 82
End: 2017-04-06

## 2017-04-06 NOTE — PROGRESS NOTES
I did discuss the patient's case with Dr. Zabala.  The patient can take aspirin and Plavix or Eliquis.  Christopher Gutierrez M.D.

## 2017-04-17 DIAGNOSIS — M25.552 LEFT HIP PAIN: Primary | ICD-10-CM

## 2017-04-20 ENCOUNTER — PATIENT OUTREACH (OUTPATIENT)
Dept: ADMINISTRATIVE | Facility: CLINIC | Age: 82
End: 2017-04-20
Payer: MEDICARE

## 2017-04-20 ENCOUNTER — OFFICE VISIT (OUTPATIENT)
Dept: ORTHOPEDICS | Facility: CLINIC | Age: 82
End: 2017-04-20
Payer: MEDICARE

## 2017-04-20 ENCOUNTER — HOSPITAL ENCOUNTER (OUTPATIENT)
Dept: RADIOLOGY | Facility: HOSPITAL | Age: 82
Discharge: HOME | End: 2017-04-20
Attending: ORTHOPAEDIC SURGERY
Payer: MEDICARE

## 2017-04-20 VITALS — BODY MASS INDEX: 37.45 KG/M2 | HEIGHT: 64 IN | WEIGHT: 219.38 LBS

## 2017-04-20 DIAGNOSIS — M25.552 LEFT HIP PAIN: ICD-10-CM

## 2017-04-20 DIAGNOSIS — Z98.890 POSTOPERATIVE STATE: Primary | ICD-10-CM

## 2017-04-20 PROCEDURE — 99999 PR PBB SHADOW E&M-EST. PATIENT-LVL III: CPT | Mod: PBBFAC,,, | Performed by: ORTHOPAEDIC SURGERY

## 2017-04-20 PROCEDURE — 99024 POSTOP FOLLOW-UP VISIT: CPT | Mod: S$GLB,,, | Performed by: ORTHOPAEDIC SURGERY

## 2017-04-20 PROCEDURE — 73502 X-RAY EXAM HIP UNI 2-3 VIEWS: CPT | Mod: 26,LT,, | Performed by: RADIOLOGY

## 2017-04-20 RX ORDER — HYDROCODONE BITARTRATE AND ACETAMINOPHEN 10; 325 MG/1; MG/1
1 TABLET ORAL EVERY 4 HOURS PRN
Qty: 60 TABLET | Refills: 0 | Status: SHIPPED | OUTPATIENT
Start: 2017-04-20 | End: 2017-04-30

## 2017-04-20 NOTE — MR AVS SNAPSHOT
[facility]  [address] [facility] [address]  [place] [address]  Phone: [phone]  Fax: [phone]                  Maldonado Deleon   2017 10:30 AM   Office Visit    Description:  Female : 1935   Provider:  Christopher Gutierrez Sr., MD   Department:  [facility]           Reason for Visit     Left Hip - Post-op Evaluation                To Do List           Future Appointments        Provider Department Dept Phone    2017 10:00 AM Christopher Gutierrez Sr., MD [facility] [phone]    3/12/2018 9:00 AM [facility] XR2 [facility] [phone]    3/12/2018 9:20 AM PULMONARY LAB, [facility] [phone]    3/12/2018 9:40 AM Dale Patel MD [facility] [phone]      Goals (5 Years of Data)     None       These Medications        Disp Refills Start End    hydrocodone-acetaminophen 10-325mg (NORCO)  mg Tab 60 tablet 0 2017    Take 1 tablet by mouth every 4 (four) hours as needed. - Oral    Pharmacy: [facility] [address] [place] [address] #: [phone]         [facility]     [facility] Nurse Care Line - 24/ Assistance  Unless otherwise directed by your provider, please contact [facility], our nurse care line that is available for  assistance.     Registered nurses in the [facility] provide: appointment scheduling, clinical advisement, health education, and other advisory services.  Call: [phone] (toll free)               Medications           Message regarding Medications     Verify the changes and/or additions to your medication regime listed below are the same as discussed with your clinician today.  If any of these changes or additions are incorrect, please notify your healthcare provider.        START taking these NEW medications        Refills    hydrocodone-acetaminophen 10-325mg (NORCO)  mg Tab 0    Sig: Take 1 tablet by  mouth every 4 (four) hours as needed.    Class: Print    Route: Oral           Verify that the below list of medications is an accurate representation of the medications you are currently taking.  If none reported, the list may be blank. If incorrect, please contact your healthcare provider. Carry this list with you in case of emergency.           Current Medications     albuterol-ipratropium 2.5mg-0.5mg/3mL (DUO-NEB) 0.5 mg-3 mg(2.5 mg base)/3 mL nebulizer solution Take 3 mLs by nebulization every 6 (six) hours.    amlodipine (NORVASC) 5 MG tablet TAKE 1 TABLET TWICE DAILY    atenolol (TENORMIN) 25 MG tablet Take 1 tablet (25 mg total) by mouth once daily.    benazepril (LOTENSIN) 20 MG tablet Take 1 tablet (20 mg total) by mouth 2 (two) times daily.    calcium carbonate (OS-RICHY) 600 mg (1,500 mg) Tab Take 600 mg by mouth once daily.    clopidogrel (PLAVIX) 75 mg tablet TAKE ONE TABLET BY MOUTH ONCE DAILY    fluticasone (FLONASE) 50 mcg/actuation nasal spray 2 sprays by Each Nare route once daily.    fluticasone-vilanterol (BREO ELLIPTA) 200-25 mcg/dose DsDv diskus inhaler Inhale 1 puff into the lungs once daily. Controller    ipratropium (ATROVENT) 0.06 % nasal spray 2 sprays by Nasal route 4 (four) times daily. As needed for rhinitis    isosorbide mononitrate (IMDUR) 60 MG 24 hr tablet Take 1 tablet (60 mg total) by mouth 2 (two) times daily.    milk thistle 175 mg tablet Take 175 mg by mouth once daily.    multivitamin (ONE DAILY MULTIVITAMIN) per tablet Take 1 tablet by mouth once daily.    nebulizer accessories Kit Use with nebulizer    nitroGLYCERIN (NITROSTAT) 0.4 MG SL tablet Place 1 tablet (0.4 mg total) under the tongue every 5 (five) minutes as needed for Chest pain.    oxycodone-acetaminophen (PERCOCET)  mg per tablet Take 1 tablet by mouth every 4 (four) hours as needed for Pain.    pravastatin (PRAVACHOL) 20 MG tablet Take 1 tablet (20 mg total) by mouth once daily.    ramelteon (ROZEREM) 8 mg  "tablet Take 1 tablet (8 mg total) by mouth nightly as needed for Insomnia.    sertraline (ZOLOFT) 100 MG tablet Take 1 tablet (100 mg total) by mouth every evening.    gabapentin (NEURONTIN) 300 MG capsule Take 1 capsule (300 mg total) by mouth 3 (three) times daily.    hydrocodone-acetaminophen 10-325mg (NORCO)  mg Tab Take 1 tablet by mouth every 4 (four) hours as needed.    oxycodone (ROXICODONE) 15 MG Tab Take 1 tablet (15 mg total) by mouth every 4 (four) hours as needed for Pain (severe pain 7-10/10 pain scale).           Clinical Reference Information           Your Vitals Were     Height Weight BMI          5' 4" (1.626 m) 99.5 kg (219 lb 5.7 oz) 37.65 kg/m2        Allergies as of 4/20/2017     No Known Allergies      Immunizations Administered on Date of Encounter - 4/20/2017     None      Language Assistance Services     ATTENTION: Language assistance services are available, free of charge. Please call [phone].      ATENCIÓN: Si erinla alfred, tiene a rinaldi disposición servicios gratuitos de asistencia lingüística. Llame al [phone].     ROSEMARIE Ý: N?u b?n nói Ti?ng Vi?t, có các d?ch v? h? tr? ngôn ng? mi?n phí dành cho b?n. G?i s? [phone].         [facility] complies with applicable Federal civil rights laws and does not discriminate on the basis of race, color, national origin, age, disability, or sex.        "

## 2017-04-20 NOTE — PROGRESS NOTES
SUBJECTIVE:  Patient is status post Left JOHN.  Patient complains of 5/ 10 pain that is better with the pain meds and aggravated with movement.   The patient is alert and oriented times three. She looks very Comfortable.                    Past Surgical History:   Procedure Laterality Date    ABDOMINAL SURGERY          BACK SURGERY            x 2    CATARACT EXTRACTION          CHOLECYSTECTOMY          COLECTOMY          CORONARY ANGIOPLASTY          EYE SURGERY          JOINT REPLACEMENT Right         hip replacement x 4    PARATHYROIDECTOMY          SHOULDER SURGERY Right       TOTAL KNEE ARTHROPLASTY Bilateral          Review of patient's allergies indicates:  No Known Allergies  No current facility-administered medications on file prior to encounter.                         Current Outpatient Prescriptions on File Prior to Encounter   Medication Sig Dispense Refill    amlodipine (NORVASC) 5 MG tablet TAKE 1 TABLET TWICE DAILY (Patient taking differently: Take 5 mg by mouth 2 (two) times daily. TAKE 1 TABLET TWICE DAILY) 180 tablet 3    atenolol (TENORMIN) 25 MG tablet Take 1 tablet (25 mg total) by mouth once daily. (Patient taking differently: Take 25 mg by mouth every evening. ) 90 tablet 3    benazepril (LOTENSIN) 20 MG tablet Take 1 tablet (20 mg total) by mouth 2 (two) times daily. 180 tablet 3    fluticasone (FLONASE) 50 mcg/actuation nasal spray 2 sprays by Each Nare route once daily. 1 Bottle 11    gabapentin (NEURONTIN) 300 MG capsule Take 1 capsule (300 mg total) by mouth 3 (three) times daily. 270 capsule 3    isosorbide mononitrate (IMDUR) 60 MG 24 hr tablet Take 1 tablet (60 mg total) by mouth 2 (two) times daily. 180 tablet 3    pravastatin (PRAVACHOL) 20 MG tablet Take 1 tablet (20 mg total) by mouth once daily. (Patient taking differently: Take 20 mg by mouth every evening. ) 90 tablet 4    sertraline (ZOLOFT) 100 MG tablet Take 1 tablet (100 mg total) by mouth every evening. 90  "tablet 3    nebulizer accessories Kit Use with nebulizer 1 kit prn    nitroGLYCERIN (NITROSTAT) 0.4 MG SL tablet Place 1 tablet (0.4 mg total) under the tongue every 5 (five) minutes as needed for Chest pain. 60 tablet 12       BP (!) 104/47 (BP Location: Left arm, Patient Position: Lying, BP Method: Automatic)  Pulse 75  Temp 100 °F (37.8 °C) (Oral)  Resp (!) 1  Ht 5' 4" (1.626 m)  Wt 99.5 kg (219 lb 5.7 oz)  SpO2 (!) 90%  Breastfeeding? No  BMI 37.65 kg/m2           ROS:  GENERAL: No fever, chills, fatigability or weight loss.  SKIN: No rashes, itching or changes in color or texture of skin.  HEAD: No headaches or recent head trauma.  EYES: Visual acuity fine. No photophobia, ocular pain or diplopia.  EARS: Denies ear pain, discharge or vertigo.  NOSE: No loss of smell, no epistaxis or postnasal drip.  MOUTH & THROAT: No hoarseness or change in voice. No excessive gum bleeding.  NODES: Denies swollen glands.  CHEST: Denies COTTER, cyanosis, wheezing, cough and sputum production.  CARDIOVASCULAR: Denies chest pain, PND, orthopnea or reduced exercise tolerance.  ABDOMEN: Appetite fine. No weight loss. Denies diarrhea, abdominal pain, hematemesis or blood in stool.  URINARY: No flank pain, dysuria or hematuria.  PERIPHERAL VASCULAR: No claudication or cyanosis.  NEUROLOGIC: No history of seizures, paralysis, alteration of gait or coordination.  MUSCULOSKELETAL: See HPI      PE:  APPEARANCE: Well nourished, well developed, in no acute distress.   HEAD: Normocephalic, atraumatic.  EYES: PERRL. EOMI.   EARS: TM's intact. Light reflex normal. No retraction or perforation.   NOSE: Mucosa pink. Airway clear.  MOUTH & THROAT: No tonsillar enlargement. No pharyngeal erythema or exudate. No stridor.  NECK: Supple.   NODES: No cervical, axillary or inguinal lymph node enlargement.  CHEST: Lungs clear to auscultation.  CARDIOVASCULAR: Normal S1, S2. No rubs, murmurs or gallops.  ABDOMEN: Bowel sounds normal. Not " distended. Soft. No tenderness or masses.  NEUROLOGIC: Cranial Nerves: II-XII grossly intact, also see MUSCULOSKELETAL  MUSCULOSKELETAL:       Left Hip -dressing intact, 2 plus dorsalis pedis and posterior tibial artery pulses, light touch intact Left lower extremity. All digits are warm. No erythema, no warmth, no drainage, no swelling, no significant tenderness. Less than 2 seconds capillary refill all digits.          ASSESSMENT:   The patient is satisfied with the results of her surgery  The patient is stable and improving.          PLAN:  Follow-up in 2 months  Continue pain medication  Continue physical therapy

## 2017-04-20 NOTE — PROGRESS NOTES
C3 nurse attempted to conduct POST ACUTE 2 call with [facility].  Spoke with Darnell, staff nurse.  States she will have to have someone call us back.  Callback number given, [phone].

## 2017-04-20 NOTE — PATIENT INSTRUCTIONS
Total Hip Replacement  Total hip replacement surgery almost always reduces joint pain. During this surgery, your problem hip joint is replaced with an artificial joint, called a prosthesis.  Benefits of hip replacement  Total hip replacement surgery almost always:  · Stops or greatly reduces hip pain. Even the pain from surgery should go away within weeks.  · Increases leg function. Without hip pain, youll be able to use your legs more. This will build up your muscles.  · Improves quality of life by allowing you to do daily tasks and low-impact activities in greater comfort.  · Provides years of easier movement. Most total hip replacements last for many years.  Your surgical experience  You will most likely arrive at the hospital on the morning of surgery. In many cases, pre-op tests are done days or even weeks ahead of time. Follow all of your surgeons instructions on preparing for surgery. When you arrive, youll be given forms to fill out. You will also talk with the anesthesiologist,  the doctor who gives the anesthesia, if you havent done so already.  Preparing for surgery  You will be told when to stop eating and drinking before surgery. If you take daily medicines, especially blood thinners, ask your doctor if you should still take them the morning of surgery. You may need to stop certain medicines several days or weeks before the surgery. At the hospital your temperature, pulse, breathing, and blood pressure will be checked. An IV (intravenous) line will be started to provide fluids and medicines needed during surgery.    The surgical procedure  When the surgical team is ready, youll be taken to the operating room. There youll be given anesthesia. The anesthesia will help you sleep through surgery, or it will make you numb from the waist down. Then an incision is made, giving the surgeon access to your hip joint. The damaged ball is removed, and the socket is prepared to hold the prosthesis. After the  new joint is in place, the incision is closed with staples or stitches.  Preparing the bone  The hip is a ball-and-socket joint. The ball is cut from the thighbone, and the surface of the old socket is smoothed. Then the new socket is put into the pelvis. The socket is usually press-fit and may be held in place with screws. A press-fit prosthesis has tiny pores on its surface that your bone will grow into. Cement or press-fit may be used to hold the ball-and-stem portion of the hip replacement.    Joining the new parts  The new hip stem is inserted into the upper portion of your thighbone. After the stem is secure in the thighbone, the new ball and socket are joined. The stem of the prosthesis may be held with cement or press-fit. Your surgeon will choose the method that is best for you.  In the recovery room  After surgery youll be sent to the recovery room, also called the PACU (postanesthesia care unit). Your condition will be watched closely, and youll be given pain medications. You may have a catheter (small tube) in your bladder and a drain in your hip. To keep your new joint stable, a foam wedge or pillows may be placed between your legs. In some cases, a brace is used.  Risks and complications  As with any surgery, hip replacement has possible risks and complications. These include the following:  · Reaction to the anesthesia  · Blood clots  · Infection  · Dislocation of the joint or loosening of the prosthesis  · Fracture  · Wearing out the prosthetic  · Damage to nearby blood vessels, bones, or nerves  · Thigh pain   Date Last Reviewed: 8/28/2015 © 2000-2016 [employer]. [address] [place] [address]. All rights reserved. This information is not intended as a substitute for professional medical care. Always follow your healthcare professional's instructions.

## 2017-04-21 ENCOUNTER — OFFICE VISIT (OUTPATIENT)
Dept: INTERNAL MEDICINE | Facility: CLINIC | Age: 82
End: 2017-04-21
Payer: MEDICARE

## 2017-04-21 ENCOUNTER — PATIENT OUTREACH (OUTPATIENT)
Dept: ADMINISTRATIVE | Facility: CLINIC | Age: 82
End: 2017-04-21
Payer: MEDICARE

## 2017-04-21 ENCOUNTER — LAB VISIT (OUTPATIENT)
Dept: LAB | Facility: HOSPITAL | Age: 82
End: 2017-04-21
Attending: FAMILY MEDICINE
Payer: MEDICARE

## 2017-04-21 ENCOUNTER — NURSE TRIAGE (OUTPATIENT)
Dept: ADMINISTRATIVE | Facility: CLINIC | Age: 82
End: 2017-04-21

## 2017-04-21 VITALS
SYSTOLIC BLOOD PRESSURE: 104 MMHG | TEMPERATURE: 97 F | DIASTOLIC BLOOD PRESSURE: 58 MMHG | HEART RATE: 65 BPM | HEIGHT: 63 IN

## 2017-04-21 DIAGNOSIS — E78.5 HYPERLIPIDEMIA: ICD-10-CM

## 2017-04-21 DIAGNOSIS — D50.0 ANEMIA DUE TO CHRONIC BLOOD LOSS: ICD-10-CM

## 2017-04-21 DIAGNOSIS — Z98.890 POSTOPERATIVE STATE: ICD-10-CM

## 2017-04-21 DIAGNOSIS — R11.0 NAUSEA: ICD-10-CM

## 2017-04-21 DIAGNOSIS — D64.9 ANEMIA, UNSPECIFIED TYPE: Primary | ICD-10-CM

## 2017-04-21 DIAGNOSIS — R35.0 INCREASED FREQUENCY OF URINATION: ICD-10-CM

## 2017-04-21 LAB
BILIRUB UR QL STRIP: ABNORMAL
CLARITY UR: ABNORMAL
COLOR UR: ABNORMAL
GLUCOSE UR QL STRIP: NEGATIVE
HGB UR QL STRIP: NEGATIVE
KETONES UR QL STRIP: ABNORMAL
LEUKOCYTE ESTERASE UR QL STRIP: ABNORMAL
MICROSCOPIC COMMENT: ABNORMAL
NITRITE UR QL STRIP: NEGATIVE
PH UR STRIP: 5 [PH] (ref 5–8)
PROT UR QL STRIP: ABNORMAL
SP GR UR STRIP: 1.02 (ref 1–1.03)
SQUAMOUS #/AREA URNS HPF: 2 /HPF
URN SPEC COLLECT METH UR: ABNORMAL
WBC #/AREA URNS HPF: 50 /HPF (ref 0–5)

## 2017-04-21 PROCEDURE — 87086 URINE CULTURE/COLONY COUNT: CPT

## 2017-04-21 PROCEDURE — 99214 OFFICE O/P EST MOD 30 MIN: CPT | Mod: S$GLB,,, | Performed by: FAMILY MEDICINE

## 2017-04-21 PROCEDURE — 1157F ADVNC CARE PLAN IN RCRD: CPT | Mod: 8P,S$GLB,, | Performed by: FAMILY MEDICINE

## 2017-04-21 PROCEDURE — 81000 URINALYSIS NONAUTO W/SCOPE: CPT | Mod: PO

## 2017-04-21 PROCEDURE — 3078F DIAST BP <80 MM HG: CPT | Mod: S$GLB,,, | Performed by: FAMILY MEDICINE

## 2017-04-21 PROCEDURE — 1159F MED LIST DOCD IN RCRD: CPT | Mod: S$GLB,,, | Performed by: FAMILY MEDICINE

## 2017-04-21 PROCEDURE — 1160F RVW MEDS BY RX/DR IN RCRD: CPT | Mod: S$GLB,,, | Performed by: FAMILY MEDICINE

## 2017-04-21 PROCEDURE — 3074F SYST BP LT 130 MM HG: CPT | Mod: S$GLB,,, | Performed by: FAMILY MEDICINE

## 2017-04-21 PROCEDURE — 99999 PR PBB SHADOW E&M-EST. PATIENT-LVL III: CPT | Mod: PBBFAC,,, | Performed by: FAMILY MEDICINE

## 2017-04-21 RX ORDER — ACETAMINOPHEN 325 MG/1
650 TABLET ORAL EVERY 6 HOURS PRN
COMMUNITY

## 2017-04-21 RX ORDER — PANTOPRAZOLE SODIUM 40 MG/1
40 TABLET, DELAYED RELEASE ORAL
COMMUNITY
End: 2017-07-18

## 2017-04-21 RX ORDER — PRAVASTATIN SODIUM 20 MG/1
TABLET ORAL
Qty: 90 TABLET | Refills: 0 | Status: SHIPPED | OUTPATIENT
Start: 2017-04-21 | End: 2017-08-15 | Stop reason: SDUPTHER

## 2017-04-21 RX ORDER — ASPIRIN 81 MG/1
81 TABLET ORAL DAILY
COMMUNITY

## 2017-04-21 RX ORDER — ONDANSETRON 4 MG/1
4 TABLET, FILM COATED ORAL EVERY 8 HOURS PRN
Qty: 30 TABLET | Refills: 0 | Status: SHIPPED | OUTPATIENT
Start: 2017-04-21 | End: 2017-04-21 | Stop reason: SDUPTHER

## 2017-04-21 RX ORDER — METFORMIN HYDROCHLORIDE 500 MG/1
500 TABLET ORAL 2 TIMES DAILY
COMMUNITY
End: 2018-06-07 | Stop reason: SDUPTHER

## 2017-04-21 RX ORDER — TRAMADOL HYDROCHLORIDE 50 MG/1
50 TABLET ORAL EVERY 6 HOURS PRN
COMMUNITY
End: 2017-07-18

## 2017-04-21 NOTE — PROGRESS NOTES
Subjective:       Patient ID: Maldonado Deleon is a 81 y.o. female.    Chief Complaint: Hospital Follow Up; Fatigue; and Nausea    Fatigue   This is a new problem. The current episode started 1 to 4 weeks ago. The problem occurs constantly. Associated symptoms include fatigue, nausea and urinary symptoms (frequency). Pertinent negatives include no arthralgias, chills, coughing, diaphoresis, headaches, joint swelling, myalgias, visual change or vomiting. Nothing aggravates the symptoms. She has tried nothing for the symptoms. The treatment provided moderate relief.   Nausea   This is a new problem. The current episode started 1 to 4 weeks ago. Associated symptoms include fatigue, nausea and urinary symptoms (frequency). Pertinent negatives include no arthralgias, chills, coughing, diaphoresis, headaches, joint swelling, myalgias, visual change or vomiting. Nothing aggravates the symptoms. She has tried nothing for the symptoms. The treatment provided moderate relief.     Review of Systems   Constitutional: Positive for fatigue. Negative for chills and diaphoresis.   Respiratory: Negative for cough.    Gastrointestinal: Positive for nausea. Negative for vomiting.   Genitourinary: Negative.    Musculoskeletal: Negative for arthralgias, joint swelling and myalgias.   Neurological: Negative for headaches.       Objective:      Physical Exam   Constitutional: She appears well-developed and well-nourished.   Cardiovascular: Normal rate and regular rhythm.    Pulmonary/Chest: Effort normal and breath sounds normal.   Abdominal: Soft. Bowel sounds are normal.   Skin: Skin is warm and dry.       Assessment:       1. Anemia, unspecified type    2. Increased frequency of urination    3. Nausea    4. Postoperative state        Plan:       Anemia, unspecified type  Comments:  Pt last Hemoglobin 7.8. Pt is CAD pt. Will get stat CBC  Orders:  -     CBC auto differential; Future; Expected date: 4/21/17    Increased frequency of  urination  Comments:  Will get Urine and send for cultures  Orders:  -     Urinalysis; Future; Expected date: 4/21/17  -     Urine culture; Future; Expected date: 4/21/17    Nausea  Comments:  Will start on zofran    Postoperative state  Comments:  Some of pt complaints may be post op related and needs to recondition. Will continue to monitor    Other orders  -     Discontinue: ondansetron (ZOFRAN) 4 MG tablet; Take 1 tablet (4 mg total) by mouth every 8 (eight) hours as needed for Nausea.  Dispense: 30 tablet; Refill: 0

## 2017-04-21 NOTE — TELEPHONE ENCOUNTER
"  Reason for Disposition   MODERATE weakness (i.e., interferes with work, school, normal activities) and cause unknown    Protocols used: ST WEAKNESS (GENERALIZED) AND FATIGUE-A-OH    C3 nurse contacted Maldonado Deleon for a Post Acute Discharge follow up call.  She reports that she is "not feeling good."  Reports she feels she has gotten even weaker since being discharged on 4/19/17 from [facility].  Her bg during call was 167.    Patient advised to go to office now, per OOC protocol.  She verbalized understanding, but refuses disposition.  States she will go to her previously scheduled appointment with Bryson Nogueira MD, today at 1400.    Please contact patient directly with any further care advice.    Respectfully,  Angle NAQVI, RN  [facility]  [email or web address]   "

## 2017-04-21 NOTE — MR AVS SNAPSHOT
[facility] Internal Medicine  [address] [place] [address]  Phone: [phone]  Fax: [phone]                  Maldonado Deleon   2017 2:00 PM   Office Visit    Description:  Female : 1935   Provider:  Bryson Nogueira MD   Department:  [facility] - Internal Medicine           Reason for Visit     Hospital Follow Up     Fatigue     Nausea           Diagnoses this Visit        Comments    Anemia, unspecified type    -  Primary Pt last Hemoglobin 7.8. Pt is CAD pt. Will get stat CBC    Increased frequency of urination     Will get Urine and send for cultures    Nausea     Will start on zofran           To Do List           Future Appointments        Provider Department Dept Phone    2017 2:55 PM LABORATORY, [facility] [phone]    2017 3:10 PM SPECIMEN, [facility] [phone]    2017 10:00 AM Christopher Gutierrez Sr., MD [facility] - Orthopedics [phone]    3/12/2018 9:00 AM [facility]2 [facility] [phone]    3/12/2018 9:20 AM PULMONARY LAB, [facility]- Pulmonary Function [facility] [phone]      Goals (5 Years of Data)     None      [facility]     [facility] Nurse Care Line - 24 Assistance  Unless otherwise directed by your provider, please contact [facility], our nurse care line that is available for  assistance.     Registered nurses in the [facility] provide: appointment scheduling, clinical advisement, health education, and other advisory services.  Call: [phone] (toll free)               Medications           Message regarding Medications     Verify the changes and/or additions to your medication regime listed below are the same as discussed with your clinician today.  If any of these changes or additions are incorrect, please notify your healthcare provider.        STOP taking these medications     fluticasone (FLONASE) 50 mcg/actuation nasal spray 2 sprays by Each  Nare route once daily.    fluticasone-vilanterol (BREO ELLIPTA) 200-25 mcg/dose DsDv diskus inhaler Inhale 1 puff into the lungs once daily. Controller    oxycodone (ROXICODONE) 15 MG Tab Take 1 tablet (15 mg total) by mouth every 4 (four) hours as needed for Pain (severe pain 7-10/10 pain scale).    oxycodone-acetaminophen (PERCOCET)  mg per tablet Take 1 tablet by mouth every 4 (four) hours as needed for Pain.           Verify that the below list of medications is an accurate representation of the medications you are currently taking.  If none reported, the list may be blank. If incorrect, please contact your healthcare provider. Carry this list with you in case of emergency.           Current Medications     acetaminophen (TYLENOL) 325 MG tablet Take 650 mg by mouth every 6 (six) hours as needed for Pain.    albuterol-ipratropium 2.5mg-0.5mg/3mL (DUO-NEB) 0.5 mg-3 mg(2.5 mg base)/3 mL nebulizer solution Take 3 mLs by nebulization every 6 (six) hours.    amlodipine (NORVASC) 5 MG tablet TAKE 1 TABLET TWICE DAILY    aspirin (ECOTRIN) 81 MG EC tablet Take 81 mg by mouth once daily.    atenolol (TENORMIN) 25 MG tablet Take 1 tablet (25 mg total) by mouth once daily.    benazepril (LOTENSIN) 20 MG tablet Take 1 tablet (20 mg total) by mouth 2 (two) times daily.    calcium carbonate (OS-RICHY) 600 mg (1,500 mg) Tab Take 600 mg by mouth once daily.     clopidogrel (PLAVIX) 75 mg tablet TAKE ONE TABLET BY MOUTH ONCE DAILY    gabapentin (NEURONTIN) 300 MG capsule Take 1 capsule (300 mg total) by mouth 3 (three) times daily.    hydrocodone-acetaminophen 10-325mg (NORCO)  mg Tab Take 1 tablet by mouth every 4 (four) hours as needed.    ipratropium (ATROVENT) 0.06 % nasal spray 2 sprays by Nasal route 4 (four) times daily. As needed for rhinitis    isosorbide mononitrate (IMDUR) 60 MG 24 hr tablet Take 1 tablet (60 mg total) by mouth 2 (two) times daily.    metformin (GLUCOPHAGE) 500 MG tablet Take 500 mg by mouth  "2 (two) times daily.    milk thistle 175 mg tablet Take 175 mg by mouth once daily.    nebulizer accessories Kit Use with nebulizer    nitroGLYCERIN (NITROSTAT) 0.4 MG SL tablet Place 1 tablet (0.4 mg total) under the tongue every 5 (five) minutes as needed for Chest pain.    pantoprazole (PROTONIX) 40 MG tablet Take 40 mg by mouth before breakfast.    pravastatin (PRAVACHOL) 20 MG tablet TAKE ONE TABLET BY MOUTH ONCE DAILY    sertraline (ZOLOFT) 100 MG tablet Take 1 tablet (100 mg total) by mouth every evening.    UNABLE TO FIND 1 tablet once daily. MULTIVIT-iron-FA-calcium-mins tab  9mg-iron-400mcg    ramelteon (ROZEREM) 8 mg tablet Take 1 tablet (8 mg total) by mouth nightly as needed for Insomnia.    tramadol (ULTRAM) 50 mg tablet Take 50 mg by mouth every 6 (six) hours as needed for Pain. For two days, per PER [facility] (patient was discharged from their facility on 4/19/17)           Clinical Reference Information           Your Vitals Were     BP Pulse Temp Height          104/58 (BP Location: Right arm, Patient Position: Sitting, BP Method: Manual) 65 97.4 °F (36.3 °C) (Tympanic) 5' 3" (1.6 m)        Blood Pressure          Most Recent Value    BP  (!)  104/58      Allergies as of 4/21/2017     No Known Allergies      Immunizations Administered on Date of Encounter - 4/21/2017     None      Orders Placed During Today's Visit     Future Labs/Procedures Expected by Expires    CBC auto differential  4/21/2017 6/20/2018    Urinalysis  4/21/2017 6/20/2018    Urine culture  4/21/2017 6/20/2018      Language Assistance Services     ATTENTION: Language assistance services are available, free of charge. Please call [phone].      ATENCIÓN: Si erinla alfred, tiene a rinaldi disposición servicios gratuitos de asistencia lingüística. Llame al [phone].     CHÚ Ý: N?u b?n nói Ti?ng Vi?t, có các d?ch v? h? tr? ngôn ng? mi?n phí dành cho b?n. G?i s? [phone].         [facility] " complies with applicable Federal civil rights laws and does not discriminate on the basis of race, color, national origin, age, disability, or sex.

## 2017-04-21 NOTE — PATIENT INSTRUCTIONS
Discharge Instructions: Caring for Your Incision  You are going home with stitches (sutures), surgical staples, special strips of surgical tape called Steri-Strips, or surgical skin glue. One of these items was used to close your incision, help stop bleeding, and speed healing. Follow the tips on this sheet to help your incision heal.  Home care  · Always wash your hands before touching your incision.  · Keep your incision clean and dry.  · Avoid doing things that could cause dirt or sweat to get on your incision.  · Dont pick at scabs. They help protect the wound.  · Keep your incision out of water.  · Take a sponge bath to avoid getting your incision wet, unless your healthcare provider tells you otherwise.  · Ask your provider when can you take a shower or bathe.  · Ask your provider about the best way to keep your incision dry when bathing or showering.  · Pat sutures dry if they get wet. Dont rub.  · Leave the dressing (bandage) in place until you are told to remove it or change it. Change it only as directed, using clean hands.  · After the first 12 hours, change your dressing every 24 hours, or as directed by your healthcare provider.  · Change your dressing if it gets wet or soiled.  Care for specific closures  Follow these guidelines unless your child's healthcare provider tells you otherwise:  · Sutures or staples. Once you no longer need to keep these dry, clean the incision or wound daily. First remove the bandage using clean hands. Then wash the area gently with soap and warm water. Use a wet cotton swab to loosen and remove any blood or crust that forms. After cleaning, put a thin layer of antibiotic ointment on. Then put on a new bandage.  · Skin glue. Dont put liquid, ointment, or cream on your incision or wound while the glue is in place. Avoid activities that cause heavy sweating. Protect the incision or wound from sunlight. Do not scratch, rub, or pick at the glue. Do not put tape directly  over the glue. The glue should peel off within 5 to 10 days.  · Surgical tape. Keep your incision or wound dry. If it gets wet, blot the area dry with a clean towel. Surgical tape usually falls off within 7 to 10 days. If it has not fallen off after 10 days, contact your healthcare provider before taking it off yourself. If you are told to remove the tape, put mineral oil or petroleum jelly on a cotton ball. Gently rub the tape until it is removed.  Follow-up care  Follow up with your healthcare provider to ask how long sutures or staples should be left in place. Be sure to return for suture or staple removal as directed. If dissolving stitches were used in your mouth, these will not need to be removed. They should fall out or dissolve on their own.  If tape closures were used, remove them yourself when your provider recommends if they have not fallen off on their own. If skin glue was used, the glue will wear off by itself.      When to seek medical care  Call your healthcare provider right away if you have any of the following:  · More pain, redness, swelling, bleeding, or foul-smelling discharge around the incision area  · Fever of 100.4°F (38°C) or higher, or as directed by your healthcare provider  · Shaking chills  · Vomiting or nausea that doesnt go away  · Numbness, coldness, or tingling around the incision area, or changes in skin color  · Opening of the sutures or wound  · Stitches or staples come apart or fall out or surgical tape falls off before 7 days, or as directed by your provider   Date Last Reviewed: 10/22/2014  © 5004-2482 [employer] [address] [place] [address], [place] [address]. All rights reserved. This information is not intended as a substitute for professional medical care. Always follow your healthcare professional's instructions.

## 2017-04-21 NOTE — PROGRESS NOTES
C3 nurse attempted to conduct POST ACUTE 2 call with [facility].  SW is not available at this time.

## 2017-04-22 LAB
BACTERIA UR CULT: NORMAL
BACTERIA UR CULT: NORMAL

## 2017-04-23 PROBLEM — R11.0 NAUSEA: Status: ACTIVE | Noted: 2017-04-23

## 2017-04-23 RX ORDER — ONDANSETRON 4 MG/1
4 TABLET, FILM COATED ORAL EVERY 8 HOURS PRN
Qty: 30 TABLET | Refills: 0 | Status: SHIPPED | OUTPATIENT
Start: 2017-04-23 | End: 2017-07-18

## 2017-04-24 ENCOUNTER — TELEPHONE (OUTPATIENT)
Dept: INTERNAL MEDICINE | Facility: CLINIC | Age: 82
End: 2017-04-24

## 2017-04-24 NOTE — TELEPHONE ENCOUNTER
----- Message from Stephanie Maza sent at 4/24/2017 12:44 PM CDT -----  results needed..[phone] (home)

## 2017-04-24 NOTE — TELEPHONE ENCOUNTER
Patient is called to see what antibiotic is going to be sent to pharmacy for her UTI. Please advise.

## 2017-04-24 NOTE — TELEPHONE ENCOUNTER
Patient needs a script for Accu check meter and diabetic supplies to be sent to [facility].

## 2017-04-24 NOTE — TELEPHONE ENCOUNTER
----- Message from Barbara Triana sent at 4/24/2017  2:42 PM CDT -----  Contact: Liya with [employer]  [phone]  fax #[phone]  Need script/orders for diabetic testing supplies for AccuCheck Meter sent directly to them - not the pharmacy.  Also requires clinical notes and a medical necessity form.

## 2017-04-24 NOTE — TELEPHONE ENCOUNTER
----- Message from Lisa Calhoun sent at 4/24/2017  1:08 PM CDT -----  Contact: Patient  Patient returned call. She can be contacted at [phone].    Thanks,  Lisa

## 2017-04-25 RX ORDER — SULFAMETHOXAZOLE AND TRIMETHOPRIM 800; 160 MG/1; MG/1
1 TABLET ORAL 2 TIMES DAILY
Qty: 20 TABLET | Refills: 0 | Status: SHIPPED | OUTPATIENT
Start: 2017-04-25 | End: 2017-07-18 | Stop reason: ALTCHOICE

## 2017-04-25 NOTE — TELEPHONE ENCOUNTER
Yes patient is still having symptoms. She stated that she is still has urinary frequency and at times it is painful.

## 2017-05-05 RX ORDER — SERTRALINE HYDROCHLORIDE 100 MG/1
TABLET, FILM COATED ORAL
Qty: 90 TABLET | Refills: 3 | Status: SHIPPED | OUTPATIENT
Start: 2017-05-05 | End: 2017-11-07 | Stop reason: SDUPTHER

## 2017-05-10 ENCOUNTER — TELEPHONE (OUTPATIENT)
Dept: ORTHOPEDICS | Facility: CLINIC | Age: 82
End: 2017-05-10

## 2017-05-10 NOTE — TELEPHONE ENCOUNTER
Return call, patient would like to starting to driving her car to go to [employer]. Patient states she does use a walker for neuropathy. I will ask Geovany Reddy PA-C and call her back on tomorrow.

## 2017-05-10 NOTE — TELEPHONE ENCOUNTER
----- Message from Maida Johnston sent at 5/10/2017  1:36 PM CDT -----  States would like to know when she can drive her car so she can go to [employer]. States she have one more week of home therapy. Please adv/call [phone].//thanks. cw

## 2017-05-11 ENCOUNTER — TELEPHONE (OUTPATIENT)
Dept: ORTHOPEDICS | Facility: CLINIC | Age: 82
End: 2017-05-11

## 2017-05-11 NOTE — TELEPHONE ENCOUNTER
Discuss with patient ok for her to drive to [employer]. She has full ROM, is weight bearing. Advised to test drive around her street first.

## 2017-05-19 RX ORDER — GABAPENTIN 300 MG/1
CAPSULE ORAL
Qty: 270 CAPSULE | Refills: 0 | Status: SHIPPED | OUTPATIENT
Start: 2017-05-19 | End: 2017-08-15 | Stop reason: SDUPTHER

## 2017-05-23 ENCOUNTER — TELEPHONE (OUTPATIENT)
Dept: ORTHOPEDICS | Facility: CLINIC | Age: 82
End: 2017-05-23

## 2017-05-23 RX ORDER — HYDROCODONE BITARTRATE AND ACETAMINOPHEN 10; 325 MG/1; MG/1
1 TABLET ORAL EVERY 8 HOURS PRN
Qty: 40 TABLET | Refills: 0 | Status: SHIPPED | OUTPATIENT
Start: 2017-05-23 | End: 2017-06-02

## 2017-05-23 NOTE — TELEPHONE ENCOUNTER
----- Message from Ramirez Solis sent at 5/23/2017 12:34 PM CDT -----  The pt is requesting a call back from the staff and can be reached at [phone].

## 2017-05-23 NOTE — TELEPHONE ENCOUNTER
Returned pt phone call. Pt requested a refill on hydrocodone acetaminophen 10. Informed pt i would send that request to the provider. Pt pharmacy was verified as [facility] on [address]

## 2017-06-21 DIAGNOSIS — M25.552 LEFT HIP PAIN: Primary | ICD-10-CM

## 2017-06-22 ENCOUNTER — OFFICE VISIT (OUTPATIENT)
Dept: ORTHOPEDICS | Facility: CLINIC | Age: 82
End: 2017-06-22
Payer: MEDICARE

## 2017-06-22 ENCOUNTER — HOSPITAL ENCOUNTER (OUTPATIENT)
Dept: RADIOLOGY | Facility: HOSPITAL | Age: 82
Discharge: HOME | End: 2017-06-22
Attending: ORTHOPAEDIC SURGERY
Payer: MEDICARE

## 2017-06-22 VITALS
SYSTOLIC BLOOD PRESSURE: 169 MMHG | BODY MASS INDEX: 38.8 KG/M2 | WEIGHT: 219 LBS | HEART RATE: 71 BPM | DIASTOLIC BLOOD PRESSURE: 88 MMHG | HEIGHT: 63 IN

## 2017-06-22 DIAGNOSIS — M25.552 LEFT HIP PAIN: ICD-10-CM

## 2017-06-22 DIAGNOSIS — Z98.890 POSTOPERATIVE STATE: Primary | ICD-10-CM

## 2017-06-22 PROCEDURE — 73502 X-RAY EXAM HIP UNI 2-3 VIEWS: CPT | Mod: 26,LT,, | Performed by: RADIOLOGY

## 2017-06-22 PROCEDURE — 99024 POSTOP FOLLOW-UP VISIT: CPT | Mod: S$GLB,,, | Performed by: ORTHOPAEDIC SURGERY

## 2017-06-22 PROCEDURE — 99999 PR PBB SHADOW E&M-EST. PATIENT-LVL III: CPT | Mod: PBBFAC,,, | Performed by: ORTHOPAEDIC SURGERY

## 2017-06-22 RX ORDER — TRAMADOL HYDROCHLORIDE 50 MG/1
50 TABLET ORAL EVERY 4 HOURS PRN
Qty: 60 TABLET | Refills: 0 | Status: SHIPPED | OUTPATIENT
Start: 2017-06-22 | End: 2017-07-02

## 2017-06-22 NOTE — PATIENT INSTRUCTIONS
Total Hip Replacement  Total hip replacement surgery almost always reduces joint pain. During this surgery, your problem hip joint is replaced with an artificial joint, called a prosthesis.  Benefits of hip replacement  Total hip replacement surgery almost always:  · Stops or greatly reduces hip pain. Even the pain from surgery should go away within weeks.  · Increases leg function. Without hip pain, youll be able to use your legs more. This will build up your muscles.  · Improves quality of life by allowing you to do daily tasks and low-impact activities in greater comfort.  · Provides years of easier movement. Most total hip replacements last for many years.  Your surgical experience  You will most likely arrive at the hospital on the morning of surgery. In many cases, pre-op tests are done days or even weeks ahead of time. Follow all of your surgeons instructions on preparing for surgery. When you arrive, youll be given forms to fill out. You will also talk with the anesthesiologist,  the doctor who gives the anesthesia, if you havent done so already.  Preparing for surgery  You will be told when to stop eating and drinking before surgery. If you take daily medicines, especially blood thinners, ask your doctor if you should still take them the morning of surgery. You may need to stop certain medicines several days or weeks before the surgery. At the hospital your temperature, pulse, breathing, and blood pressure will be checked. An IV (intravenous) line will be started to provide fluids and medicines needed during surgery.    The surgical procedure  When the surgical team is ready, youll be taken to the operating room. There youll be given anesthesia. The anesthesia will help you sleep through surgery, or it will make you numb from the waist down. Then an incision is made, giving the surgeon access to your hip joint. The damaged ball is removed, and the socket is prepared to hold the prosthesis. After the  new joint is in place, the incision is closed with staples or stitches.  Preparing the bone  The hip is a ball-and-socket joint. The ball is cut from the thighbone, and the surface of the old socket is smoothed. Then the new socket is put into the pelvis. The socket is usually press-fit and may be held in place with screws. A press-fit prosthesis has tiny pores on its surface that your bone will grow into. Cement or press-fit may be used to hold the ball-and-stem portion of the hip replacement.    Joining the new parts  The new hip stem is inserted into the upper portion of your thighbone. After the stem is secure in the thighbone, the new ball and socket are joined. The stem of the prosthesis may be held with cement or press-fit. Your surgeon will choose the method that is best for you.  In the recovery room  After surgery youll be sent to the recovery room, also called the PACU (postanesthesia care unit). Your condition will be watched closely, and youll be given pain medications. You may have a catheter (small tube) in your bladder and a drain in your hip. To keep your new joint stable, a foam wedge or pillows may be placed between your legs. In some cases, a brace is used.  Risks and complications  As with any surgery, hip replacement has possible risks and complications. These include the following:  · Reaction to the anesthesia  · Blood clots  · Infection  · Dislocation of the joint or loosening of the prosthesis  · Fracture  · Wearing out the prosthetic  · Damage to nearby blood vessels, bones, or nerves  · Thigh pain   Date Last Reviewed: 8/28/2015 © 2000-2016 [employer]. [address] [place] [address]. All rights reserved. This information is not intended as a substitute for professional medical care. Always follow your healthcare professional's instructions.

## 2017-06-22 NOTE — PROGRESS NOTES
SUBJECTIVE:  Patient is status post Left JOHN.  The patient does have a neuropathy in the right lower extremity.  She does protect the right lower extremity and bear more weight on the left lower extremity.  Patient complains of 5/ 10 pain that is better with the pain meds and aggravated with movement.   The patient is alert and oriented times three. She looks very Comfortable.                    Past Surgical History:   Procedure Laterality Date    ABDOMINAL SURGERY          BACK SURGERY            x 2    CATARACT EXTRACTION          CHOLECYSTECTOMY          COLECTOMY          CORONARY ANGIOPLASTY          EYE SURGERY          JOINT REPLACEMENT Right         hip replacement x 4    PARATHYROIDECTOMY          SHOULDER SURGERY Right       TOTAL KNEE ARTHROPLASTY Bilateral          Review of patient's allergies indicates:  No Known Allergies  No current facility-administered medications on file prior to encounter.                         Current Outpatient Prescriptions on File Prior to Encounter   Medication Sig Dispense Refill    amlodipine (NORVASC) 5 MG tablet TAKE 1 TABLET TWICE DAILY (Patient taking differently: Take 5 mg by mouth 2 (two) times daily. TAKE 1 TABLET TWICE DAILY) 180 tablet 3    atenolol (TENORMIN) 25 MG tablet Take 1 tablet (25 mg total) by mouth once daily. (Patient taking differently: Take 25 mg by mouth every evening. ) 90 tablet 3    benazepril (LOTENSIN) 20 MG tablet Take 1 tablet (20 mg total) by mouth 2 (two) times daily. 180 tablet 3    fluticasone (FLONASE) 50 mcg/actuation nasal spray 2 sprays by Each Nare route once daily. 1 Bottle 11    gabapentin (NEURONTIN) 300 MG capsule Take 1 capsule (300 mg total) by mouth 3 (three) times daily. 270 capsule 3    isosorbide mononitrate (IMDUR) 60 MG 24 hr tablet Take 1 tablet (60 mg total) by mouth 2 (two) times daily. 180 tablet 3    pravastatin (PRAVACHOL) 20 MG tablet Take 1 tablet (20 mg total) by mouth once daily. (Patient  "taking differently: Take 20 mg by mouth every evening. ) 90 tablet 4    sertraline (ZOLOFT) 100 MG tablet Take 1 tablet (100 mg total) by mouth every evening. 90 tablet 3    nebulizer accessories Kit Use with nebulizer 1 kit prn    nitroGLYCERIN (NITROSTAT) 0.4 MG SL tablet Place 1 tablet (0.4 mg total) under the tongue every 5 (five) minutes as needed for Chest pain. 60 tablet 12       BP (!) 169/88 (BP Location: Right arm, Patient Position: Sitting, BP Method: Automatic)   Pulse 71   Ht 5' 3" (1.6 m)   Wt 99.3 kg (219 lb)   BMI 38.79 kg/m²             ROS:  GENERAL: No fever, chills, fatigability or weight loss.  SKIN: No rashes, itching or changes in color or texture of skin.  HEAD: No headaches or recent head trauma.  EYES: Visual acuity fine. No photophobia, ocular pain or diplopia.  EARS: Denies ear pain, discharge or vertigo.  NOSE: No loss of smell, no epistaxis or postnasal drip.  MOUTH & THROAT: No hoarseness or change in voice. No excessive gum bleeding.  NODES: Denies swollen glands.  CHEST: Denies COTTER, cyanosis, wheezing, cough and sputum production.  CARDIOVASCULAR: Denies chest pain, PND, orthopnea or reduced exercise tolerance.  ABDOMEN: Appetite fine. No weight loss. Denies diarrhea, abdominal pain, hematemesis or blood in stool.  URINARY: No flank pain, dysuria or hematuria.  PERIPHERAL VASCULAR: No claudication or cyanosis.  NEUROLOGIC: No history of seizures, paralysis, alteration of gait or coordination.  MUSCULOSKELETAL: See HPI      PE:  APPEARANCE: Well nourished, well developed, in no acute distress.   HEAD: Normocephalic, atraumatic.  EYES: PERRL. EOMI.   EARS: TM's intact. Light reflex normal. No retraction or perforation.   NOSE: Mucosa pink. Airway clear.  MOUTH & THROAT: No tonsillar enlargement. No pharyngeal erythema or exudate. No stridor.  NECK: Supple.   NODES: No cervical, axillary or inguinal lymph node enlargement.  CHEST: Lungs clear to auscultation.  CARDIOVASCULAR: " Normal S1, S2. No rubs, murmurs or gallops.  ABDOMEN: Bowel sounds normal. Not distended. Soft. No tenderness or masses.  NEUROLOGIC: Cranial Nerves: II-XII grossly intact, also see MUSCULOSKELETAL  MUSCULOSKELETAL:       Left Hip  2 plus dorsalis pedis and posterior tibial artery pulses, light touch intact Left lower extremity. All digits are warm. No erythema, no warmth, no drainage, no swelling, no significant tenderness. Less than 2 seconds capillary refill all digits.          ASSESSMENT:   The patient is satisfied with the results of her surgery  The patient is stable and improving.          PLAN:  Refer to pain management or physiatry for an evaluation of lumbar spine  Follow-up in 6 months  Continue pain medication  Continue physical therapy

## 2017-06-27 RX ORDER — METFORMIN HYDROCHLORIDE 500 MG/1
TABLET ORAL
Qty: 180 TABLET | Refills: 2 | Status: SHIPPED | OUTPATIENT
Start: 2017-06-27 | End: 2017-07-18 | Stop reason: SDUPTHER

## 2017-07-18 ENCOUNTER — OFFICE VISIT (OUTPATIENT)
Dept: CARDIOLOGY | Facility: CLINIC | Age: 82
End: 2017-07-18
Payer: MEDICARE

## 2017-07-18 VITALS
BODY MASS INDEX: 39.64 KG/M2 | DIASTOLIC BLOOD PRESSURE: 48 MMHG | SYSTOLIC BLOOD PRESSURE: 88 MMHG | WEIGHT: 223.75 LBS | HEIGHT: 63 IN | HEART RATE: 80 BPM

## 2017-07-18 DIAGNOSIS — I20.9 AP (ANGINA PECTORIS): ICD-10-CM

## 2017-07-18 DIAGNOSIS — I25.10 ATHEROSCLEROSIS OF NATIVE CORONARY ARTERY OF NATIVE HEART WITHOUT ANGINA PECTORIS: ICD-10-CM

## 2017-07-18 DIAGNOSIS — I77.9 CAROTID ARTERY DISEASE WITHOUT CEREBRAL INFARCTION: ICD-10-CM

## 2017-07-18 DIAGNOSIS — I25.10 CAD IN NATIVE ARTERY: ICD-10-CM

## 2017-07-18 DIAGNOSIS — E11.9 CONTROLLED TYPE 2 DIABETES MELLITUS WITHOUT COMPLICATION, WITHOUT LONG-TERM CURRENT USE OF INSULIN: ICD-10-CM

## 2017-07-18 DIAGNOSIS — I51.89 DIASTOLIC DYSFUNCTION: Primary | ICD-10-CM

## 2017-07-18 DIAGNOSIS — R42 DIZZINESS: ICD-10-CM

## 2017-07-18 DIAGNOSIS — I10 ESSENTIAL HYPERTENSION: ICD-10-CM

## 2017-07-18 DIAGNOSIS — I65.23 ASYMPTOMATIC STENOSIS OF BOTH CAROTID ARTERIES WITHOUT INFARCTION: ICD-10-CM

## 2017-07-18 DIAGNOSIS — E78.2 MIXED HYPERLIPIDEMIA: ICD-10-CM

## 2017-07-18 DIAGNOSIS — I77.1 STENOSIS OF RIGHT SUBCLAVIAN ARTERY: ICD-10-CM

## 2017-07-18 DIAGNOSIS — Z98.61 HISTORY OF PTCA: ICD-10-CM

## 2017-07-18 PROBLEM — Z98.890 POSTOPERATIVE STATE: Status: RESOLVED | Noted: 2017-04-20 | Resolved: 2017-07-18

## 2017-07-18 PROBLEM — Z01.818 PREOP EXAM FOR INTERNAL MEDICINE: Status: RESOLVED | Noted: 2017-03-17 | Resolved: 2017-07-18

## 2017-07-18 PROCEDURE — 99214 OFFICE O/P EST MOD 30 MIN: CPT | Mod: S$GLB,,, | Performed by: INTERNAL MEDICINE

## 2017-07-18 PROCEDURE — 99999 PR PBB SHADOW E&M-EST. PATIENT-LVL III: CPT | Mod: PBBFAC,,, | Performed by: INTERNAL MEDICINE

## 2017-07-18 PROCEDURE — 1159F MED LIST DOCD IN RCRD: CPT | Mod: S$GLB,,, | Performed by: INTERNAL MEDICINE

## 2017-07-18 RX ORDER — AMLODIPINE BESYLATE 2.5 MG/1
2.5 TABLET ORAL DAILY
Qty: 30 TABLET | Refills: 11 | Status: SHIPPED | OUTPATIENT
Start: 2017-07-18 | End: 2020-06-29

## 2017-07-18 NOTE — PROGRESS NOTES
Subjective:    Patient ID:  Maldonado Deleon is a 82 y.o. female who presents for evaluation of Coronary Artery Disease (Patient presents to clinic today for 6 month follow up.); Carotid Artery Disease; Shortness of Breath; Hypertension; and Hyperlipidemia      HPI Mrs. Deleon returns for f/u of CAD.   Her current medical conditions include COPD, carotid artery disease, hypertension, hyperlipidemia, diabetes (former smoker) and CAD. Nonsmoker.  On home O2 qhs.   s/p [facility] 6/15 for anginal sxs and had PCI of calcified 90% mid-RCA stenosis with Diamondback atherectomy and LINDSAY x one. Her mid-LAD stenosis was overal stable in 50 - 60% range and medical mgt advised.  She is here for f/u.   S/p hip surgery March 2017, recovering.  Has chronic angina, took 1 - 2 sl ntg over last 3 months, and alleviated cp.  Cp worse with stress anxiety.  Stable chronic COTTER.  No pnd/orthopnea.   C/o ankle edema at end of day.  Diuretics cause leg cramps.  BP low, chronic dizziness and fatigue.  No acute sxs.   Off balance at times. No syncope.       Patient Active Problem List   Diagnosis    Colon cancer    Anemia due to chronic blood loss    Iron deficiency anemia, unspecified    Diaphragmatic hernia without obstruction and without gangrene    Chronic obstructive pulmonary disease    Abnormal CXR (chest x-ray)    CAD in native artery    History of PTCA    Carotid artery disease without cerebral infarction    Hypertension    Mixed hyperlipidemia    Neck pain    Lung nodule - Hamartoma    Sinus congestion    Chronic pain syndrome    Intractable episodic headache    Left knee pain    Left leg swelling    Hematoma    Anxiety    AP (angina pectoris)    Atherosclerosis of native coronary artery without angina pectoris    Diabetes type 2, controlled    Controlled type 2 diabetes mellitus without complication, without long-term current use of insulin    Left hip pain    Diastolic dysfunction    Arthritis of left hip     Vasomotor rhinitis    Anemia    Increased frequency of urination    Nausea    Asymptomatic stenosis of both carotid arteries without infarction    Stenosis of right subclavian artery    Dizziness     Past Medical History:   Diagnosis Date    Anticoagulant long-term use     Plavix    Atypical chest pain 8/26/2013    Back pain     CAD (coronary artery disease)     CAD in native artery 10/5/2015    Carotid artery disease without cerebral infarction 8/26/2013    Colon cancer     resection and chemo.    COPD (chronic obstructive pulmonary disease)     Depression     Diabetes mellitus type II     Diaphragmatic hernia without obstruction and without gangrene 8/13/2015    Emphysema of lung     Encounter for blood transfusion     Gallstone pancreatitis     History of PTCA 10/5/2015    Hypertension     Lymphocytic colitis     Meningioma     OA (osteoarthritis)     Obesity 8/26/2013    Oxygen dependent     q hs and prn       Current Outpatient Prescriptions:     acetaminophen (TYLENOL) 325 MG tablet, Take 650 mg by mouth every 6 (six) hours as needed for Pain., Disp: , Rfl:     albuterol-ipratropium 2.5mg-0.5mg/3mL (DUO-NEB) 0.5 mg-3 mg(2.5 mg base)/3 mL nebulizer solution, Take 3 mLs by nebulization every 6 (six) hours., Disp: 120 vial, Rfl: 12    aspirin (ECOTRIN) 81 MG EC tablet, Take 81 mg by mouth once daily., Disp: , Rfl:     atenolol (TENORMIN) 25 MG tablet, Take 1 tablet (25 mg total) by mouth once daily. (Patient taking differently: Take 25 mg by mouth every evening. ), Disp: 90 tablet, Rfl: 3    benazepril (LOTENSIN) 20 MG tablet, Take 1 tablet (20 mg total) by mouth 2 (two) times daily., Disp: 180 tablet, Rfl: 3    clopidogrel (PLAVIX) 75 mg tablet, TAKE ONE TABLET BY MOUTH ONCE DAILY, Disp: 90 tablet, Rfl: 2    gabapentin (NEURONTIN) 300 MG capsule, TAKE ONE CAPSULE BY MOUTH THREE TIMES DAILY, Disp: 270 capsule, Rfl: 0    ipratropium (ATROVENT) 0.06 % nasal spray, 2 sprays by  "Nasal route 4 (four) times daily. As needed for rhinitis, Disp: 15 mL, Rfl: 11    isosorbide mononitrate (IMDUR) 60 MG 24 hr tablet, Take 1 tablet (60 mg total) by mouth 2 (two) times daily., Disp: 180 tablet, Rfl: 3    metformin (GLUCOPHAGE) 500 MG tablet, Take 500 mg by mouth 2 (two) times daily., Disp: , Rfl:     milk thistle 175 mg tablet, Take 175 mg by mouth once daily., Disp: , Rfl:     pravastatin (PRAVACHOL) 20 MG tablet, TAKE ONE TABLET BY MOUTH ONCE DAILY, Disp: 90 tablet, Rfl: 0    sertraline (ZOLOFT) 100 MG tablet, TAKE ONE TABLET BY MOUTH IN THE EVENING, Disp: 90 tablet, Rfl: 3    UNABLE TO FIND, 1 tablet once daily. MULTIVIT-iron-FA-calcium-mins tab 9mg-iron-400mcg, Disp: , Rfl:     amlodipine (NORVASC) 2.5 MG tablet, Take 1 tablet (2.5 mg total) by mouth once daily., Disp: 30 tablet, Rfl: 11    calcium carbonate (OS-RICHY) 600 mg (1,500 mg) Tab, Take 600 mg by mouth once daily. , Disp: , Rfl:     nebulizer accessories Kit, Use with nebulizer, Disp: 1 kit, Rfl: prn    nitroGLYCERIN (NITROSTAT) 0.4 MG SL tablet, Place 1 tablet (0.4 mg total) under the tongue every 5 (five) minutes as needed for Chest pain., Disp: 60 tablet, Rfl: 12    Review of Systems   Constitution: Positive for malaise/fatigue.   HENT: Positive for headaches.    Eyes: Negative.    Cardiovascular: Positive for chest pain, dyspnea on exertion and leg swelling.   Respiratory: Positive for shortness of breath.    Endocrine: Negative.    Hematologic/Lymphatic: Negative.    Skin: Negative.    Musculoskeletal: Positive for arthritis and back pain.   Gastrointestinal: Negative.    Genitourinary: Negative.    Neurological: Positive for dizziness, light-headedness and loss of balance.   Psychiatric/Behavioral: Negative.    Allergic/Immunologic: Negative.        BP (!) 88/48   Pulse 80   Ht 5' 3" (1.6 m)   Wt 101.5 kg (223 lb 12.3 oz)   BMI 39.64 kg/m²      /60    RUE 90/50      Wt Readings from Last 3 Encounters:   07/18/17 " 101.5 kg (223 lb 12.3 oz)   06/22/17 99.3 kg (219 lb)   04/20/17 99.5 kg (219 lb 5.7 oz)     Temp Readings from Last 3 Encounters:   04/21/17 97.4 °F (36.3 °C) (Tympanic)   04/04/17 98.2 °F (36.8 °C) (Oral)   03/17/17 97.1 °F (36.2 °C) (Tympanic)     BP Readings from Last 3 Encounters:   07/18/17 (!) 88/48   06/22/17 (!) 169/88   04/21/17 (!) 104/58     Pulse Readings from Last 3 Encounters:   07/18/17 80   06/22/17 71   04/21/17 65          Objective:    Physical Exam   Constitutional: She is oriented to person, place, and time. Vital signs are normal. She appears well-developed and well-nourished. She is active and cooperative. She does not have a sickly appearance. She does not appear ill. No distress.   HENT:   Head: Normocephalic.   Neck: Neck supple. Normal carotid pulses, no hepatojugular reflux and no JVD present. Carotid bruit is not present. No thyromegaly present.   Cardiovascular: Normal rate, regular rhythm, S1 normal, S2 normal and normal pulses.  PMI is not displaced.  Exam reveals no gallop and no friction rub.    Murmur heard.   Medium-pitched midsystolic murmur is present with a grade of 1/6  at the upper left sternal border  Pulses:       Radial pulses are 2+ on the right side, and 2+ on the left side.   Pulmonary/Chest: Effort normal and breath sounds normal. She has no wheezes. She has no rales.   Abdominal: Soft. Normal appearance, normal aorta and bowel sounds are normal. She exhibits no pulsatile liver, no abdominal bruit, no ascites and no mass. There is no splenomegaly or hepatomegaly. There is no tenderness.   Musculoskeletal: She exhibits edema.   Lymphadenopathy:     She has no cervical adenopathy.   Neurological: She is alert and oriented to person, place, and time.   Skin: Skin is warm. She is not diaphoretic.   Psychiatric: She has a normal mood and affect. Her behavior is normal.   Nursing note and vitals reviewed.      I have reviewed all pertinent labs and cardiac studies.       Chemistry        Component Value Date/Time     03/15/2017 1020    K 4.2 03/15/2017 1020     03/15/2017 1020    CO2 31 (H) 03/15/2017 1020    BUN 20 03/15/2017 1020    CREATININE 0.9 03/15/2017 1020     03/15/2017 1020        Component Value Date/Time    CALCIUM 9.6 03/15/2017 1020    ALKPHOS 66 03/15/2017 1020    AST 18 03/15/2017 1020    ALT 12 03/15/2017 1020    BILITOT 0.6 03/15/2017 1020    ESTGFRAFRICA >60 03/15/2017 1020    EGFRNONAA >60 03/15/2017 1020        Lab Results   Component Value Date    WBC 8.85 04/21/2017    HGB 10.8 (L) 04/21/2017    HCT 35.4 (L) 04/21/2017    MCV 94 04/21/2017     04/21/2017     Lab Results   Component Value Date    HGBA1C 6.1 03/29/2017     Lab Results   Component Value Date    CHOL 168 07/19/2016    CHOL 159 04/12/2016    CHOL 151 06/17/2014     Lab Results   Component Value Date    HDL 54 07/19/2016    HDL 58 04/12/2016    HDL 61 06/17/2014     Lab Results   Component Value Date    LDLCALC 92.0 07/19/2016    LDLCALC 75.8 04/12/2016    LDLCALC 69.4 06/17/2014     Lab Results   Component Value Date    TRIG 110 07/19/2016    TRIG 126 04/12/2016    TRIG 103 06/17/2014     Lab Results   Component Value Date    CHOLHDL 32.1 07/19/2016    CHOLHDL 36.5 04/12/2016    CHOLHDL 40.4 06/17/2014           Assessment:       1. Diastolic dysfunction    2. Mixed hyperlipidemia    3. History of PTCA    4. CAD in native artery    5. AP (angina pectoris)    6. Atherosclerosis of native coronary artery of native heart without angina pectoris    7. Carotid artery disease without cerebral infarction    8. Controlled type 2 diabetes mellitus without complication, without long-term current use of insulin    9. Asymptomatic stenosis of both carotid arteries without infarction    10. Stenosis of right subclavian artery    11. Essential hypertension    12. Dizziness         Plan:             LOWER AMLODIPINE TO 2.5 MG QD FROM 5 MG BID DUE TO LOW BP AND ANKLE EDEMA SIDE  EFFECT.  CHECK CAROTID US AND RIGHT UPPER EXTREMITY ARTERIAL U/S TO RULE OUT RIGHT SUBCLAVIAN STENOSIS AS ETIOLOGY OF HER DISCREPANCY BETWEEN UPPER EXTREMITY BP READINGS.  CONTINUE OTHER MEDS.  SL NTG PRN FOR ANGINA WHICH OVERALL SEEMS CHRONIC AND STABLE.  CHECK 48 HOUR HOLTER.   CARDIAC DIET    F/U 8 WEEKS.

## 2017-07-24 ENCOUNTER — CLINICAL SUPPORT (OUTPATIENT)
Dept: CARDIOLOGY | Facility: CLINIC | Age: 82
End: 2017-07-24
Payer: MEDICARE

## 2017-07-24 DIAGNOSIS — I65.23 ASYMPTOMATIC STENOSIS OF BOTH CAROTID ARTERIES WITHOUT INFARCTION: ICD-10-CM

## 2017-07-24 DIAGNOSIS — R42 DIZZINESS: ICD-10-CM

## 2017-07-24 DIAGNOSIS — I77.1 STENOSIS OF RIGHT SUBCLAVIAN ARTERY: ICD-10-CM

## 2017-07-24 PROCEDURE — 93880 EXTRACRANIAL BILAT STUDY: CPT | Mod: S$GLB,,, | Performed by: INTERNAL MEDICINE

## 2017-07-24 PROCEDURE — 93224 XTRNL ECG REC UP TO 48 HRS: CPT | Mod: S$GLB,,, | Performed by: INTERNAL MEDICINE

## 2017-07-24 PROCEDURE — 93931 UPPER EXTREMITY STUDY: CPT | Mod: S$GLB,,, | Performed by: INTERNAL MEDICINE

## 2017-07-25 LAB — INTERNAL CAROTID STENOSIS: ABNORMAL

## 2017-07-30 ENCOUNTER — HOSPITAL ENCOUNTER (EMERGENCY)
Facility: HOSPITAL | Age: 82
Discharge: HOME | End: 2017-07-30
Attending: EMERGENCY MEDICINE
Payer: MEDICARE

## 2017-07-30 VITALS
TEMPERATURE: 99 F | WEIGHT: 222 LBS | HEART RATE: 82 BPM | RESPIRATION RATE: 16 BRPM | OXYGEN SATURATION: 98 % | BODY MASS INDEX: 37.9 KG/M2 | SYSTOLIC BLOOD PRESSURE: 182 MMHG | HEIGHT: 64 IN | DIASTOLIC BLOOD PRESSURE: 76 MMHG

## 2017-07-30 DIAGNOSIS — Z87.898 HISTORY OF UNSTEADY GAIT: Primary | ICD-10-CM
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
## 2017-07-30 DIAGNOSIS — S02.2XXA CLOSED FRACTURE OF NASAL BONE, INITIAL ENCOUNTER: ICD-10-CM

## 2017-07-30 DIAGNOSIS — I10 ESSENTIAL HYPERTENSION: ICD-10-CM

## 2017-07-30 LAB
ANION GAP SERPL CALC-SCNC: 10 MMOL/L
APTT BLDCRRT: 25.1 SEC
BASOPHILS # BLD AUTO: 0.06 K/UL
BASOPHILS NFR BLD: 0.7 %
BILIRUB UR QL STRIP: NEGATIVE
BUN SERPL-MCNC: 18 MG/DL
CALCIUM SERPL-MCNC: 9.3 MG/DL
CHLORIDE SERPL-SCNC: 102 MMOL/L
CLARITY UR: CLEAR
CO2 SERPL-SCNC: 29 MMOL/L
COLOR UR: YELLOW
CREAT SERPL-MCNC: 0.8 MG/DL
DIFFERENTIAL METHOD: ABNORMAL
EOSINOPHIL # BLD AUTO: 0.2 K/UL
EOSINOPHIL NFR BLD: 1.8 %
ERYTHROCYTE [DISTWIDTH] IN BLOOD BY AUTOMATED COUNT: 16 %
EST. GFR  (AFRICAN AMERICAN): >60 ML/MIN/1.73 M^2
EST. GFR  (NON AFRICAN AMERICAN): >60 ML/MIN/1.73 M^2
GLUCOSE SERPL-MCNC: 99 MG/DL
GLUCOSE UR QL STRIP: NEGATIVE
HCT VFR BLD AUTO: 37.5 %
HGB BLD-MCNC: 12.1 G/DL
HGB UR QL STRIP: ABNORMAL
INR PPP: 1
KETONES UR QL STRIP: NEGATIVE
LEUKOCYTE ESTERASE UR QL STRIP: NEGATIVE
LYMPHOCYTES # BLD AUTO: 1.5 K/UL
LYMPHOCYTES NFR BLD: 18 %
MCH RBC QN AUTO: 27.6 PG
MCHC RBC AUTO-ENTMCNC: 32.3 G/DL
MCV RBC AUTO: 85 FL
MONOCYTES # BLD AUTO: 0.7 K/UL
MONOCYTES NFR BLD: 8.7 %
NEUTROPHILS # BLD AUTO: 5.9 K/UL
NEUTROPHILS NFR BLD: 70.8 %
NITRITE UR QL STRIP: NEGATIVE
PH UR STRIP: 7 [PH] (ref 5–8)
PLATELET # BLD AUTO: 189 K/UL
PMV BLD AUTO: 10.7 FL
POTASSIUM SERPL-SCNC: 4.4 MMOL/L
PROT UR QL STRIP: NEGATIVE
PROTHROMBIN TIME: 10.5 SEC
RBC # BLD AUTO: 4.39 M/UL
SODIUM SERPL-SCNC: 141 MMOL/L
SP GR UR STRIP: <=1.005 (ref 1–1.03)
URN SPEC COLLECT METH UR: ABNORMAL
UROBILINOGEN UR STRIP-ACNC: NEGATIVE EU/DL
WBC # BLD AUTO: 8.3 K/UL

## 2017-07-30 PROCEDURE — 99284 EMERGENCY DEPT VISIT MOD MDM: CPT | Mod: 25

## 2017-07-30 PROCEDURE — 85730 THROMBOPLASTIN TIME PARTIAL: CPT

## 2017-07-30 PROCEDURE — 90715 TDAP VACCINE 7 YRS/> IM: CPT | Performed by: EMERGENCY MEDICINE

## 2017-07-30 PROCEDURE — 80048 BASIC METABOLIC PNL TOTAL CA: CPT

## 2017-07-30 PROCEDURE — 85025 COMPLETE CBC W/AUTO DIFF WBC: CPT

## 2017-07-30 PROCEDURE — 85610 PROTHROMBIN TIME: CPT

## 2017-07-30 PROCEDURE — 96374 THER/PROPH/DIAG INJ IV PUSH: CPT

## 2017-07-30 PROCEDURE — 63600175 PHARM REV CODE 636 W HCPCS: Performed by: EMERGENCY MEDICINE

## 2017-07-30 PROCEDURE — 90471 IMMUNIZATION ADMIN: CPT | Performed by: EMERGENCY MEDICINE

## 2017-07-30 PROCEDURE — 25000003 PHARM REV CODE 250: Performed by: EMERGENCY MEDICINE

## 2017-07-30 PROCEDURE — 81003 URINALYSIS AUTO W/O SCOPE: CPT

## 2017-07-30 RX ORDER — AMOXICILLIN 875 MG/1
875 TABLET, FILM COATED ORAL 2 TIMES DAILY
Qty: 14 TABLET | Refills: 0 | Status: SHIPPED | OUTPATIENT
Start: 2017-07-30 | End: 2017-09-08 | Stop reason: ALTCHOICE

## 2017-07-30 RX ORDER — DOCUSATE SODIUM 100 MG/1
100 CAPSULE, LIQUID FILLED ORAL 2 TIMES DAILY PRN
Qty: 30 CAPSULE | Refills: 0 | Status: SHIPPED | OUTPATIENT
Start: 2017-07-30 | End: 2018-01-15 | Stop reason: ALTCHOICE

## 2017-07-30 RX ORDER — HYDROCODONE BITARTRATE AND ACETAMINOPHEN 10; 325 MG/1; MG/1
1 TABLET ORAL
Status: COMPLETED | OUTPATIENT
Start: 2017-07-30 | End: 2017-07-30

## 2017-07-30 RX ORDER — HYDROCODONE BITARTRATE AND ACETAMINOPHEN 7.5; 325 MG/1; MG/1
1 TABLET ORAL EVERY 6 HOURS PRN
Qty: 15 TABLET | Refills: 0 | Status: SHIPPED | OUTPATIENT
Start: 2017-07-30 | End: 2018-01-15 | Stop reason: SDUPTHER

## 2017-07-30 RX ORDER — HYDRALAZINE HYDROCHLORIDE 20 MG/ML
20 INJECTION INTRAMUSCULAR; INTRAVENOUS
Status: COMPLETED | OUTPATIENT
Start: 2017-07-30 | End: 2017-07-30

## 2017-07-30 RX ORDER — CETIRIZINE HYDROCHLORIDE 10 MG/1
10 TABLET ORAL DAILY
Qty: 30 TABLET | Refills: 0 | Status: SHIPPED | OUTPATIENT
Start: 2017-07-30 | End: 2017-09-08

## 2017-07-30 RX ADMIN — HYDROCODONE BITARTRATE AND ACETAMINOPHEN 1 TABLET: 10; 325 TABLET ORAL at 04:07

## 2017-07-30 RX ADMIN — CLOSTRIDIUM TETANI TOXOID ANTIGEN (FORMALDEHYDE INACTIVATED), CORYNEBACTERIUM DIPHTHERIAE TOXOID ANTIGEN (FORMALDEHYDE INACTIVATED), BORDETELLA PERTUSSIS TOXOID ANTIGEN (GLUTARALDEHYDE INACTIVATED), BORDETELLA PERTUSSIS FILAMENTOUS HEMAGGLUTININ ANTIGEN (FORMALDEHYDE INACTIVATED), BORDETELLA PERTUSSIS PERTACTIN ANTIGEN, AND BORDETELLA PERTUSSIS FIMBRIAE 2/3 ANTIGEN 0.5 ML: 5; 2; 2.5; 5; 3; 5 INJECTION, SUSPENSION INTRAMUSCULAR at 04:07

## 2017-07-30 RX ADMIN — HYDRALAZINE HYDROCHLORIDE 20 MG: 20 INJECTION INTRAMUSCULAR; INTRAVENOUS at 05:07

## 2017-07-30 NOTE — ED NOTES
Level of Consciousness: Patient is awake, alert, oriented to person, place, time, and situation.   Appearance: Pt resting comfortably in stretcher, no acute distress at this time. Clothing appropriately placed and clean. Hygiene is appropriate.   Skin: Skin is warm, dry, and intact. Skin turgor is normal/elastic. Mucous membranes moist. Skin color is normal for ethnicity. No skin breakdown noted. Small lac to nose  Musculoskeletal: Moves all extremities well. Full active ROM. No deformities noted. Pt c/o general weakness/dizziness, ambulated with w/c   Respiratory: Airway open and patent. Respirations equal and unlabored. Breath sounds clear to auscultation. Denies any SOB.   Cardiac: Regular rate and rhythm. No peripheral edema noted. Radial and pedal pulses present and normal. Capillary refill is within normal limits. Denies chest pain.   GI: Abdomen soft. Bowel sounds present and active in all quads. Abdomen symmetric with no distention noted. Denies any N/V/D.   Neurological: Symmetrical expressions noted to face. Equal bilateral . Normal sensation reported to all extremities. No obvious neurological deficits noted.   Psychosocial: Speech spontaneous, clear, and coherent.

## 2017-07-30 NOTE — ED PROVIDER NOTES
"SCRIBE #1 NOTE: I, Nathalia Dominguez, am scribing for, and in the presence of, Clemente White Jr., MD. I have scribed the entire note.      History      Chief Complaint   Patient presents with    Fall     Fell down and hit face on floor,reports on blood thinners       Review of patient's allergies indicates:  No Known Allergies     HPI   HPI    7/30/2017, 4:18 PM   History obtained from the patient      History of Present Illness: Maldonado Deleon is a 82 y.o. female patient who presents to the Emergency Department for accidental fall which onset suddenly today about 1 hour PTA. Pt states that she was watching the [facility] [place] with her family at onset. Symptoms are constant and moderate in severity.  No mitigating or exacerbating factors reported. Associated sxs include "chronic balance problem (unknown cause)", abrasion to nose, and elevated BP. Pt states that she takes Plavix.  Patient denies any head injury/trauma, LOC,  HA, dizziness, back pain, neck pain, knee pain, hip pain, abd pain, CP, SOB, and all other sxs at this time.  No prior Tx reported. No further complaints or concerns at this time.         Arrival mode: Personal vehicle      PCP: Bryson Nogueira MD       Past Medical History:  Past Medical History:   Diagnosis Date    Anticoagulant long-term use     Plavix    Atypical chest pain 8/26/2013    Back pain     CAD (coronary artery disease)     CAD in native artery 10/5/2015    Carotid artery disease without cerebral infarction 8/26/2013    Colon cancer     resection and chemo.    COPD (chronic obstructive pulmonary disease)     Depression     Diabetes mellitus type II     Diaphragmatic hernia without obstruction and without gangrene 8/13/2015    Emphysema of lung     Encounter for blood transfusion     Gallstone pancreatitis     History of PTCA 10/5/2015    Hypertension     Lymphocytic colitis     Meningioma     OA (osteoarthritis)     Obesity 8/26/2013    Oxygen dependent     q hs and prn "       Past Surgical History:  Past Surgical History:   Procedure Laterality Date    ABDOMINAL SURGERY      BACK SURGERY      x 2    CATARACT EXTRACTION      CHOLECYSTECTOMY      COLECTOMY      CORONARY ANGIOPLASTY      EYE SURGERY      JOINT REPLACEMENT Right     hip replacement x 4    PARATHYROIDECTOMY      SHOULDER SURGERY Right     TOTAL HIP ARTHROPLASTY Left     TOTAL KNEE ARTHROPLASTY Bilateral          Family History:  Family History   Problem Relation Age of Onset    Hypertension Mother     Diabetes Mother     Heart failure Mother     Diabetes Maternal Aunt     Hypertension Maternal Aunt     Heart failure Maternal Aunt     Diabetes Maternal Aunt     Hypertension Maternal Aunt     Heart failure Maternal Aunt     Diabetes Maternal Aunt     Hypertension Maternal Aunt     Heart failure Maternal Aunt     Diabetes Maternal Aunt     Hypertension Maternal Aunt     Heart failure Maternal Aunt     Heart disease Father        Social History:  Social History     Social History Main Topics    Smoking status: Former Smoker     Packs/day: 2.50     Years: 50.00     Types: Cigarettes     Quit date: 8/7/1987    Smokeless tobacco: Never Used    Alcohol use No    Drug use: No    Sexual activity: No       ROS   Review of Systems   Constitutional: Negative for chills and fever.        - head trauma / LOC   + elevated BP    HENT: Negative for sore throat.         + abrasion to nose    Respiratory: Negative for shortness of breath.    Cardiovascular: Negative for chest pain.   Gastrointestinal: Negative for nausea.   Genitourinary: Negative for dysuria.   Musculoskeletal: Negative for back pain and neck pain.         - knee pain  - hip pain   Skin: Negative for rash.   Neurological: Negative for dizziness, weakness and headaches.        + balance problem   Hematological: Does not bruise/bleed easily.       Physical Exam      Initial Vitals [07/30/17 1558]   BP Pulse Resp Temp SpO2   (!) 219/105 73 20  "97.3 °F (36.3 °C) 96 %      MAP       143          Physical Exam  Nursing Notes and Vital Signs Reviewed.  Constitutional: Patient is in no apparent distress. Well-developed and well-nourished.  Head: Atraumatic. Normocephalic.  Eyes: PERRL. EOM intact. Conjunctivae are not pale. No scleral icterus.  ENT: Mucous membranes are moist. Oropharynx is clear and symmetric.    Neck: Supple. Full ROM. No lymphadenopathy. No cervical midline bony tenderness, deformities, or step-offs.   Back: No tenderness. No midline bony tenderness, deformities, or step-offs of the T-spine or L-spine. Skin appears normal without abrasions or bruising. No erythema, induration, or fluctuance.   Neurological: Awake and alert. Appropriate for age. Positive straight leg raise bilaterally. No strength deficit; equal and 5/5 in bilateral upper and lower extremities. No light touch sensory deficit. DTRs 2+ and equal. Normal gait. No acute focal neurological deficits noted.  Cardiovascular: Regular rate. Regular rhythm. No murmurs, rubs, or gallops. Distal pulses are 2+ and symmetric.  Pulmonary/Chest: No respiratory distress. Clear to auscultation bilaterally. No wheezing, rales, or rhonchi.  Abdominal: Soft and non-distended.  There is no tenderness.  No rebound, guarding, or rigidity. Good bowel sounds.  Genitourinary: No CVA tenderness  Musculoskeletal: Moves all extremities. No obvious deformities. No edema. No calf tenderness.  Skin: Warm and dry. Abrasion noted to bridge of nose.  Psychiatric: Normal affect. Good eye contact. Appropriate in content.    ED Course    Procedures  ED Vital Signs:  Vitals:    07/30/17 1558 07/30/17 1731   BP: (!) 219/105 (!) 215/107   Pulse: 73 66   Resp: 20 12   Temp: 97.3 °F (36.3 °C)    TempSrc: Oral    SpO2: 96% 95%   Weight: 100.7 kg (222 lb)    Height: 5' 4" (1.626 m)        Abnormal Lab Results:  Labs Reviewed   CBC W/ AUTO DIFFERENTIAL - Abnormal; Notable for the following:        Result Value    RDW 16.0 " (*)     All other components within normal limits   APTT   PROTIME-INR   BASIC METABOLIC PANEL   URINALYSIS   URINALYSIS        All Lab Results:  Results for orders placed or performed during the hospital encounter of 07/30/17   CBC auto differential   Result Value Ref Range    WBC 8.30 3.90 - 12.70 K/uL    RBC 4.39 4.00 - 5.40 M/uL    Hemoglobin 12.1 12.0 - 16.0 g/dL    Hematocrit 37.5 37.0 - 48.5 %    MCV 85 82 - 98 fL    MCH 27.6 27.0 - 31.0 pg    MCHC 32.3 32.0 - 36.0 g/dL    RDW 16.0 (H) 11.5 - 14.5 %    Platelets 189 150 - 350 K/uL    MPV 10.7 9.2 - 12.9 fL    Gran # 5.9 1.8 - 7.7 K/uL    Lymph # 1.5 1.0 - 4.8 K/uL    Mono # 0.7 0.3 - 1.0 K/uL    Eos # 0.2 0.0 - 0.5 K/uL    Baso # 0.06 0.00 - 0.20 K/uL    Gran% 70.8 38.0 - 73.0 %    Lymph% 18.0 18.0 - 48.0 %    Mono% 8.7 4.0 - 15.0 %    Eosinophil% 1.8 0.0 - 8.0 %    Basophil% 0.7 0.0 - 1.9 %    Differential Method Automated    APTT   Result Value Ref Range    aPTT 25.1 21.0 - 32.0 sec   Protime-INR   Result Value Ref Range    Prothrombin Time 10.5 9.0 - 12.5 sec    INR 1.0 0.8 - 1.2         Imaging Results:  Imaging Results          CT Head Without Contrast (Final result)  Result time 07/30/17 16:35:29    Final result by Marlin Nye III, MD (07/30/17 16:35:29)                 Impression:       1.  No acute intracranial disease identified.        Electronically signed by: MARLIN NYE MD  Date:     07/30/17  Time:    16:35              Narrative:    Head CT without contrast    Clinical history: fall, head injury    TECHNIQUE: Routine noncontrast axial head CT. All CT scans at this facility use dose modulation, iterative reconstruction, and/or weight based dosing when appropriate to reduce radiation dose to as low as reasonably achievable.    FINDINGS: Atherosclerotic calcifications of the intracranial internal carotid arteries are noted. There is moderate bihemispheric white matter hypodensity, most consistent with chronic microvascular ischemic change.   There is a 1.2 cm calcified right anterior parafalcine meningioma. There is no CT evidence of acute cortical ischemia, intracranial hemorrhage, mass-effect,  obstructive hydrocephalus or other acute disease.  There is chronic mucosal thickening of the left maxillary sinus. The remaining visualized paranasal sinuses and mastoid air cells are clear. No calvarial fracture is identified.                             CT Maxillofacial Without Contrast (Final result)  Result time 07/30/17 16:59:16    Final result by Marlin Nye III, MD (07/30/17 16:59:16)                 Impression:            Minimally displaced nasal fractures.        Electronically signed by: MARLIN NYE MD  Date:     07/30/17  Time:    16:59              Narrative:    Exam: CT MAXILLOFACIAL WITHOUT CONTRAST    History: fall, facial injury.     Technique: Axial images through the face were obtained without the use of IV contrast.  Sagittal and coronal reconstructions are provided for review. All CT scans at this facility use dose modulation, iterative reconstruction, and/or weight based dosing when appropriate to reduce radiation dose to as low as reasonably achievable.     Findings:  None are bilateral anterior nasal fractures with minimal impaction on the right and rightward angulation.  There may be nondisplaced fracture extension into the adjacent anterior superior bony nasal septum.  No other fractures identified.  There is chronic mucosal thickening in the maxillary sinuses and ethmoid air cells. The remaining paranasal sinuses are clear.  There is periapical lucency surrounding several teeth compatible with chronic periodontal disease.  The globes are intact. The intraorbital structures are unremarkable.                                      The Emergency Provider reviewed the vital signs and test results, which are outlined above.    ED Discussion   Pre-hypertension/Hypertension: The pt has been informed that they may have pre-hypertension or  hypertension based on a blood pressure reading in the ED. I recommend that the pt call the PCP listed on their discharge instructions or a physician of their choice this week to arrange f/u for further evaluation of possible pre-hypertension or hypertension.     Trauma precautions were discussed with patient and/or family/caretaker; I do not specifically detect any abdominal, thoracic, CNS, orthopedic, or other emergent or life threatening condition and that patient is safe to be discharged.  It was also discussed that despite an unrevealing examination and negative radiographic examination for serious or life threatening injury, these conditions may still exist.  As such, patient should return to ED immediately should they experience, severe or worsening pain, shortness of breath, abdominal pain, headache, vomiting, or any other concern.  It was also discussed that not infrequently, injuries may not be diagnosed during the initial ED visit (such as fractures) and that if the patient discovers a new area of concern, a new area of injury that was not evaluated in the ED, they should return for evaluation as they may have an injury that requires treatment.    5:40 PM: Reassessed pt at this time.  Pt states her condition has improved at this time. Discussed with pt all pertinent ED information and results. Discussed pt dx and plan of tx. Gave pt all f/u and return to the ED instructions. All questions and concerns were addressed at this time. Pt expresses understanding of information and instructions, and is comfortable with plan to discharge. Pt is stable for discharge.        ED Medication(s):  Medications   Tdap vaccine injection 0.5 mL (0.5 mLs Intramuscular Given 7/30/17 1620)   hydrocodone-acetaminophen 10-325mg per tablet 1 tablet (1 tablet Oral Given 7/30/17 1620)   hydrALAZINE injection 20 mg (20 mg Intravenous Given 7/30/17 1737)       New Prescriptions    No medications on file             Medical Decision  Making    Medical Decision Making:   Clinical Tests:   Lab Tests: Reviewed and Ordered  Radiological Study: Reviewed and Ordered           Scribe Attestation:   Scribe #1: I performed the above scribed service and the documentation accurately describes the services I performed. I attest to the accuracy of the note.    Attending:   Physician Attestation Statement for Scribe #1: I, Clemente White Jr., MD, personally performed the services described in this documentation, as scribed by Nathalia Dominguez, in my presence, and it is both accurate and complete.          Clinical Impression       ICD-10-CM ICD-9-CM   1. History of unsteady gait Z87.898 V15.89   2. Fall, initial encounter W19.XXXA E888.9   3. Closed fracture of nasal bone, initial encounter S02.2XXA 802.0   4. Essential hypertension I10 401.9         Disposition:   Disposition: Discharged  Condition: Stable         Clemente White Jr., MD  07/31/17 0045

## 2017-08-02 ENCOUNTER — OFFICE VISIT (OUTPATIENT)
Dept: INTERNAL MEDICINE | Facility: CLINIC | Age: 82
End: 2017-08-02
Payer: MEDICARE

## 2017-08-02 VITALS
OXYGEN SATURATION: 90 % | HEART RATE: 80 BPM | HEIGHT: 64 IN | WEIGHT: 226.63 LBS | SYSTOLIC BLOOD PRESSURE: 146 MMHG | BODY MASS INDEX: 38.69 KG/M2 | TEMPERATURE: 98 F | DIASTOLIC BLOOD PRESSURE: 84 MMHG

## 2017-08-02 DIAGNOSIS — I10 ESSENTIAL HYPERTENSION: ICD-10-CM

## 2017-08-02 DIAGNOSIS — F41.9 ANXIETY: ICD-10-CM

## 2017-08-02 DIAGNOSIS — E11.9 CONTROLLED TYPE 2 DIABETES MELLITUS WITHOUT COMPLICATION, WITHOUT LONG-TERM CURRENT USE OF INSULIN: Primary | ICD-10-CM

## 2017-08-02 DIAGNOSIS — S02.2XXA CLOSED FRACTURE OF NASAL BONE, INITIAL ENCOUNTER: ICD-10-CM

## 2017-08-02 DIAGNOSIS — G62.9 NEUROPATHY: ICD-10-CM

## 2017-08-02 PROCEDURE — 99214 OFFICE O/P EST MOD 30 MIN: CPT | Mod: S$GLB,,, | Performed by: FAMILY MEDICINE

## 2017-08-02 PROCEDURE — 1125F AMNT PAIN NOTED PAIN PRSNT: CPT | Mod: S$GLB,,, | Performed by: FAMILY MEDICINE

## 2017-08-02 PROCEDURE — 99999 PR PBB SHADOW E&M-EST. PATIENT-LVL III: CPT | Mod: PBBFAC,,, | Performed by: FAMILY MEDICINE

## 2017-08-02 PROCEDURE — 1159F MED LIST DOCD IN RCRD: CPT | Mod: S$GLB,,, | Performed by: FAMILY MEDICINE

## 2017-08-02 RX ORDER — SERTRALINE HYDROCHLORIDE 50 MG/1
50 TABLET, FILM COATED ORAL DAILY
Qty: 30 TABLET | Refills: 0 | Status: SHIPPED | OUTPATIENT
Start: 2017-08-02 | End: 2017-08-30 | Stop reason: SDUPTHER

## 2017-08-02 NOTE — PROGRESS NOTES
Subjective:       Patient ID: Maldonado Deleon is a 82 y.o. female.    Chief Complaint: Follow-up (emergency room)    Fall:      Pt is a 82 year old who is here after follow-up after fall in daughter house. Pt has bad neuropathy and is on gabapentin 300 mg tid. Pt has also a fracture of the nasal boens.       Review of Systems   Constitutional: Negative.    Respiratory: Negative.    Cardiovascular: Negative.    Genitourinary: Negative.    Neurological: Negative.    Hematological: Negative.        Objective:      Physical Exam   Constitutional: She is oriented to person, place, and time. She appears well-developed and well-nourished.   Cardiovascular: Normal rate and regular rhythm.    Pulmonary/Chest: Effort normal and breath sounds normal.   Abdominal: Soft. Bowel sounds are normal.   Neurological: She is alert and oriented to person, place, and time.   Skin: Skin is warm and dry.       Assessment:       1. Controlled type 2 diabetes mellitus without complication, without long-term current use of insulin    2. Neuropathy    3. Closed fracture of nasal bone, initial encounter    4. Anxiety    5. Essential hypertension        Plan:       Controlled type 2 diabetes mellitus without complication, without long-term current use of insulin  Comments:  Pt glucsoe is stable at hgA1C is 6.1    Neuropathy  Comments:  Will have pt increase the gabapentin 300 mg morning and 900 mg at night.    Closed fracture of nasal bone, initial encounter  Comments:  Will re xray in about 6 weeks    Anxiety  Comments:  Will increase the zoloft to 150 mg.    Essential hypertension  Comments:  Will have pt check her BP since amlodpine was decreased    Other orders  -     sertraline (ZOLOFT) 50 MG tablet; Take 1 tablet (50 mg total) by mouth once daily.  Dispense: 30 tablet; Refill: 0

## 2017-08-15 DIAGNOSIS — E78.5 HYPERLIPIDEMIA: ICD-10-CM

## 2017-08-15 NOTE — TELEPHONE ENCOUNTER
----- Message from Stephanie Maza sent at 8/15/2017  1:27 PM CDT -----  needs callback rg rx refill, will elaborate...[phone] (home)

## 2017-08-15 NOTE — TELEPHONE ENCOUNTER
Patient asking if she can take 2 tablets of gabapentin in the morning and 3 tablets of gabapentin at night. Please advise

## 2017-08-16 RX ORDER — PRAVASTATIN SODIUM 20 MG/1
TABLET ORAL
Qty: 90 TABLET | Refills: 2 | Status: SHIPPED | OUTPATIENT
Start: 2017-08-16 | End: 2018-01-29 | Stop reason: ALTCHOICE

## 2017-08-16 RX ORDER — GABAPENTIN 300 MG/1
300 CAPSULE ORAL 3 TIMES DAILY
Qty: 270 CAPSULE | Refills: 2 | Status: SHIPPED | OUTPATIENT
Start: 2017-08-16 | End: 2017-11-07 | Stop reason: SDUPTHER

## 2017-08-31 RX ORDER — SERTRALINE HYDROCHLORIDE 50 MG/1
TABLET, FILM COATED ORAL
Qty: 90 TABLET | Refills: 1 | Status: SHIPPED | OUTPATIENT
Start: 2017-08-31 | End: 2017-11-07

## 2017-09-08 ENCOUNTER — OFFICE VISIT (OUTPATIENT)
Dept: INTERNAL MEDICINE | Facility: CLINIC | Age: 82
End: 2017-09-08
Payer: MEDICARE

## 2017-09-08 VITALS
BODY MASS INDEX: 38.42 KG/M2 | SYSTOLIC BLOOD PRESSURE: 180 MMHG | WEIGHT: 225.06 LBS | HEIGHT: 64 IN | HEART RATE: 82 BPM | DIASTOLIC BLOOD PRESSURE: 90 MMHG | TEMPERATURE: 97 F

## 2017-09-08 DIAGNOSIS — E11.9 CONTROLLED TYPE 2 DIABETES MELLITUS WITHOUT COMPLICATION, WITHOUT LONG-TERM CURRENT USE OF INSULIN: ICD-10-CM

## 2017-09-08 DIAGNOSIS — R51.9 INTRACTABLE HEADACHE, UNSPECIFIED CHRONICITY PATTERN, UNSPECIFIED HEADACHE TYPE: ICD-10-CM

## 2017-09-08 DIAGNOSIS — I10 ESSENTIAL HYPERTENSION: ICD-10-CM

## 2017-09-08 DIAGNOSIS — M89.9 BONE DISORDER: Primary | ICD-10-CM

## 2017-09-08 DIAGNOSIS — G62.9 NEUROPATHY: ICD-10-CM

## 2017-09-08 PROCEDURE — 3077F SYST BP >= 140 MM HG: CPT | Mod: S$GLB,,, | Performed by: FAMILY MEDICINE

## 2017-09-08 PROCEDURE — 1126F AMNT PAIN NOTED NONE PRSNT: CPT | Mod: S$GLB,,, | Performed by: FAMILY MEDICINE

## 2017-09-08 PROCEDURE — 3008F BODY MASS INDEX DOCD: CPT | Mod: S$GLB,,, | Performed by: FAMILY MEDICINE

## 2017-09-08 PROCEDURE — 1159F MED LIST DOCD IN RCRD: CPT | Mod: S$GLB,,, | Performed by: FAMILY MEDICINE

## 2017-09-08 PROCEDURE — 3080F DIAST BP >= 90 MM HG: CPT | Mod: S$GLB,,, | Performed by: FAMILY MEDICINE

## 2017-09-08 PROCEDURE — 99214 OFFICE O/P EST MOD 30 MIN: CPT | Mod: S$GLB,,, | Performed by: FAMILY MEDICINE

## 2017-09-08 PROCEDURE — 99999 PR PBB SHADOW E&M-EST. PATIENT-LVL III: CPT | Mod: PBBFAC,,, | Performed by: FAMILY MEDICINE

## 2017-09-08 RX ORDER — HYDRALAZINE HYDROCHLORIDE 10 MG/1
10 TABLET, FILM COATED ORAL 3 TIMES DAILY
Qty: 90 TABLET | Refills: 11 | Status: SHIPPED | OUTPATIENT
Start: 2017-09-08 | End: 2019-01-24 | Stop reason: SDUPTHER

## 2017-09-08 RX ORDER — LEVOCETIRIZINE DIHYDROCHLORIDE 5 MG/1
5 TABLET, FILM COATED ORAL NIGHTLY
Qty: 30 TABLET | Refills: 11 | Status: SHIPPED | OUTPATIENT
Start: 2017-09-08 | End: 2018-02-20 | Stop reason: ALTCHOICE

## 2017-09-08 NOTE — PATIENT INSTRUCTIONS
Xyzal 1 tablet- this is replacing the zyrtec    Hydralazine 10 mg: take if top number is greater than 150 or bottom is greater than 100. Recheck blood pressure in 2 hours. May repeat this 4 time if blood pressure remains high.      Amlodipine 5 mg.

## 2017-09-08 NOTE — PROGRESS NOTES
Subjective:       Patient ID: Maldonado Deleon is a 82 y.o. female.    Chief Complaint: Headache    HTN:     Pt has had increase BP since fall in June. Pt is currently on amlodipine 2.5 with BP going up and down.       Headache    This is a chronic problem. The current episode started yesterday. The problem occurs constantly. The pain is located in the bilateral region. The quality of the pain is described as aching. The pain is at a severity of 7/10. Pertinent negatives include no blurred vision, dizziness, drainage, ear pain, eye pain, insomnia, loss of balance, muscle aches, neck pain, numbness, phonophobia, sinus pressure, swollen glands or visual change. Nothing aggravates the symptoms. She has tried nothing for the symptoms. Her past medical history is significant for sinus disease. There is no history of cancer, cluster headaches, hypertension, obesity, pseudotumor cerebri or TMJ.     Review of Systems   Constitutional: Negative.    HENT: Positive for congestion. Negative for ear pain and sinus pressure.    Eyes: Negative for blurred vision and pain.   Respiratory: Negative.    Cardiovascular: Negative.    Gastrointestinal: Negative.    Genitourinary: Negative.    Musculoskeletal: Negative for neck pain.   Neurological: Positive for headaches. Negative for dizziness, numbness and loss of balance.   Psychiatric/Behavioral: The patient does not have insomnia.        Objective:      Physical Exam   Constitutional: She is oriented to person, place, and time. She appears well-developed and well-nourished.   HENT:   Right Ear: Tympanic membrane is erythematous.   Left Ear: Tympanic membrane is erythematous.   Nose: Mucosal edema and rhinorrhea present.   Mouth/Throat: Posterior oropharyngeal erythema present.   Eyes: EOM are normal. Pupils are equal, round, and reactive to light.   Cardiovascular: Normal rate and regular rhythm.  Exam reveals no friction rub.    No murmur heard.  Pulmonary/Chest: Effort normal and  breath sounds normal. She has no wheezes. She has no rales.   Abdominal: Soft. Bowel sounds are normal.   Neurological: She is alert and oriented to person, place, and time.       Assessment:       1. Bone disorder    2. Essential hypertension    3. Intractable headache, unspecified chronicity pattern, unspecified headache type    4. Controlled type 2 diabetes mellitus without complication, without long-term current use of insulin    5. Neuropathy        Plan:       Bone disorder  Comments:  will order dexa scan  Orders:  -     DXA Bone Density Spine And Hip; Future; Expected date: 09/08/2017    Essential hypertension  Comments:  BP is elevated will increase the amlodipine to 5 mg a day. Hydralazine as back up for elevated pressures    Intractable headache, unspecified chronicity pattern, unspecified headache type  Comments:  Likely HTN related but could be sinus    Controlled type 2 diabetes mellitus without complication, without long-term current use of insulin  Comments:  Will continue to watch sugars    Neuropathy  Comments:  Increase the GABApentin    Other orders  -     levocetirizine (XYZAL) 5 MG tablet; Take 1 tablet (5 mg total) by mouth every evening.  Dispense: 30 tablet; Refill: 11  -     hydrALAZINE (APRESOLINE) 10 MG tablet; Take 1 tablet (10 mg total) by mouth 3 (three) times daily.  Dispense: 90 tablet; Refill: 11

## 2017-09-09 ENCOUNTER — NURSE TRIAGE (OUTPATIENT)
Dept: ADMINISTRATIVE | Facility: CLINIC | Age: 82
End: 2017-09-09

## 2017-09-15 RX ORDER — BENAZEPRIL HYDROCHLORIDE 20 MG/1
TABLET ORAL
Qty: 180 TABLET | Refills: 3 | Status: SHIPPED | OUTPATIENT
Start: 2017-09-15 | End: 2018-11-02 | Stop reason: SDUPTHER

## 2017-09-19 ENCOUNTER — CLINICAL SUPPORT (OUTPATIENT)
Dept: CARDIOLOGY | Facility: CLINIC | Age: 82
End: 2017-09-19
Payer: MEDICARE

## 2017-09-19 ENCOUNTER — OFFICE VISIT (OUTPATIENT)
Dept: CARDIOLOGY | Facility: CLINIC | Age: 82
End: 2017-09-19
Payer: MEDICARE

## 2017-09-19 VITALS
WEIGHT: 222 LBS | DIASTOLIC BLOOD PRESSURE: 80 MMHG | HEIGHT: 64 IN | SYSTOLIC BLOOD PRESSURE: 130 MMHG | HEART RATE: 74 BPM | BODY MASS INDEX: 37.9 KG/M2

## 2017-09-19 DIAGNOSIS — I10 HYPERTENSION: ICD-10-CM

## 2017-09-19 DIAGNOSIS — Z98.61 HISTORY OF PTCA: ICD-10-CM

## 2017-09-19 DIAGNOSIS — I77.9 CAROTID ARTERY DISEASE WITHOUT CEREBRAL INFARCTION: ICD-10-CM

## 2017-09-19 DIAGNOSIS — I65.23 ASYMPTOMATIC STENOSIS OF BOTH CAROTID ARTERIES WITHOUT INFARCTION: ICD-10-CM

## 2017-09-19 DIAGNOSIS — I51.89 DIASTOLIC DYSFUNCTION: ICD-10-CM

## 2017-09-19 DIAGNOSIS — E78.2 MIXED HYPERLIPIDEMIA: ICD-10-CM

## 2017-09-19 DIAGNOSIS — I77.1 STENOSIS OF RIGHT SUBCLAVIAN ARTERY: ICD-10-CM

## 2017-09-19 DIAGNOSIS — I25.10 ATHEROSCLEROSIS OF NATIVE CORONARY ARTERY OF NATIVE HEART WITHOUT ANGINA PECTORIS: ICD-10-CM

## 2017-09-19 DIAGNOSIS — E11.9 CONTROLLED TYPE 2 DIABETES MELLITUS WITHOUT COMPLICATION, WITHOUT LONG-TERM CURRENT USE OF INSULIN: ICD-10-CM

## 2017-09-19 DIAGNOSIS — I10 ESSENTIAL HYPERTENSION: ICD-10-CM

## 2017-09-19 DIAGNOSIS — I20.9 AP (ANGINA PECTORIS): ICD-10-CM

## 2017-09-19 DIAGNOSIS — I25.10 CAD IN NATIVE ARTERY: ICD-10-CM

## 2017-09-19 DIAGNOSIS — I10 ESSENTIAL HYPERTENSION: Primary | ICD-10-CM

## 2017-09-19 PROCEDURE — 3008F BODY MASS INDEX DOCD: CPT | Mod: S$GLB,,, | Performed by: INTERNAL MEDICINE

## 2017-09-19 PROCEDURE — 99499 UNLISTED E&M SERVICE: CPT | Mod: S$GLB,,, | Performed by: INTERNAL MEDICINE

## 2017-09-19 PROCEDURE — 99214 OFFICE O/P EST MOD 30 MIN: CPT | Mod: S$GLB,,, | Performed by: INTERNAL MEDICINE

## 2017-09-19 PROCEDURE — 3075F SYST BP GE 130 - 139MM HG: CPT | Mod: S$GLB,,, | Performed by: INTERNAL MEDICINE

## 2017-09-19 PROCEDURE — 1159F MED LIST DOCD IN RCRD: CPT | Mod: S$GLB,,, | Performed by: INTERNAL MEDICINE

## 2017-09-19 PROCEDURE — 93000 ELECTROCARDIOGRAM COMPLETE: CPT | Mod: S$GLB,,, | Performed by: INTERNAL MEDICINE

## 2017-09-19 PROCEDURE — 99999 PR PBB SHADOW E&M-EST. PATIENT-LVL III: CPT | Mod: PBBFAC,,, | Performed by: INTERNAL MEDICINE

## 2017-09-19 PROCEDURE — 1126F AMNT PAIN NOTED NONE PRSNT: CPT | Mod: S$GLB,,, | Performed by: INTERNAL MEDICINE

## 2017-09-19 PROCEDURE — 3079F DIAST BP 80-89 MM HG: CPT | Mod: S$GLB,,, | Performed by: INTERNAL MEDICINE

## 2017-09-19 RX ORDER — ATENOLOL 25 MG/1
25 TABLET ORAL NIGHTLY
Qty: 90 TABLET | Refills: 3 | Status: SHIPPED | OUTPATIENT
Start: 2017-09-19 | End: 2018-08-13 | Stop reason: SDUPTHER

## 2017-09-19 NOTE — PROGRESS NOTES
Subjective:    Patient ID:  Maldonado Deleon is a 82 y.o. female who presents for evaluation of Hypertension (c/o elevated blood pressure); Coronary Artery Disease; Hyperlipidemia; and Peripheral Arterial Disease      HPI  Mrs. Deleon returns for f/u of CAD.   Her current medical conditions include COPD, R subclavian stenosis, carotid artery disease, hypertension, hyperlipidemia, diabetes (former smoker) and CAD. Nonsmoker.  On home O2 qhs.   s/p [facility] 6/15 for anginal sxs and had PCI of calcified 90% mid-RCA stenosis with Diamondback atherectomy and LINDSAY x one. Her mid-LAD stenosis was overal stable in 50 - 60% range and medical mgt advised.  She is here for f/u.  She fell in late July and hurt herself and has been off balance. Was not using cane or walker.  Fell on her face.  No presyncope or syncope.  No preceeding chest pain sxs or unusual dyspnea.    Has chronic angina, takes occasional sl ntg for relief.  This is stable.  Chronic COTTER.  ECG today shows NSR, low precordial R waves. No acute changes.   Some dizziness bending over.  No vision changes.  No upper extremity weakness.  No arm claudication.  + fall risk.  Amlodipine lowered last cards appt but then BP got higher and PCP increased back to 5 mg daily few weeks back.  No tia/cva sxs.    Carotid u/s is stable overall.    Patient Active Problem List   Diagnosis    Colon cancer    Anemia due to chronic blood loss    Iron deficiency anemia, unspecified    Diaphragmatic hernia without obstruction and without gangrene    Chronic obstructive pulmonary disease    Abnormal CXR (chest x-ray)    CAD in native artery    History of PTCA    Carotid artery disease without cerebral infarction    Hypertension    Mixed hyperlipidemia    Neck pain    Lung nodule - Hamartoma    Sinus congestion    Chronic pain syndrome    Intractable headache    Left knee pain    Left leg swelling    Hematoma    Anxiety    AP (angina pectoris)    Atherosclerosis of native  coronary artery without angina pectoris    Diabetes type 2, controlled    Controlled type 2 diabetes mellitus without complication, without long-term current use of insulin    Left hip pain    Diastolic dysfunction    Arthritis of left hip    Vasomotor rhinitis    Anemia    Increased frequency of urination    Nausea    Asymptomatic stenosis of both carotid arteries without infarction    Stenosis of right subclavian artery    Dizziness    Neuropathy    Closed fracture of nasal bone    Bone disorder     Past Medical History:   Diagnosis Date    Anticoagulant long-term use     Plavix    Atypical chest pain 8/26/2013    Back pain     CAD (coronary artery disease)     CAD in native artery 10/5/2015    Carotid artery disease without cerebral infarction 8/26/2013    Colon cancer     resection and chemo.    COPD (chronic obstructive pulmonary disease)     Depression     Diabetes mellitus type II     Diaphragmatic hernia without obstruction and without gangrene 8/13/2015    Emphysema of lung     Encounter for blood transfusion     Gallstone pancreatitis     History of PTCA 10/5/2015    Hypertension     Lymphocytic colitis     Meningioma     OA (osteoarthritis)     Obesity 8/26/2013    Oxygen dependent     q hs and prn       Current Outpatient Prescriptions:     acetaminophen (TYLENOL) 325 MG tablet, Take 650 mg by mouth every 6 (six) hours as needed for Pain., Disp: , Rfl:     albuterol-ipratropium 2.5mg-0.5mg/3mL (DUO-NEB) 0.5 mg-3 mg(2.5 mg base)/3 mL nebulizer solution, Take 3 mLs by nebulization every 6 (six) hours., Disp: 120 vial, Rfl: 12    amlodipine (NORVASC) 2.5 MG tablet, Take 1 tablet (2.5 mg total) by mouth once daily., Disp: 30 tablet, Rfl: 11    aspirin (ECOTRIN) 81 MG EC tablet, Take 81 mg by mouth once daily., Disp: , Rfl:     atenolol (TENORMIN) 25 MG tablet, Take 1 tablet (25 mg total) by mouth once daily. (Patient taking differently: Take 25 mg by mouth every  evening. ), Disp: 90 tablet, Rfl: 3    benazepril (LOTENSIN) 20 MG tablet, TAKE ONE TABLET BY MOUTH TWICE DAILY, Disp: 180 tablet, Rfl: 3    calcium carbonate (OS-RICHY) 600 mg (1,500 mg) Tab, Take 600 mg by mouth once daily. , Disp: , Rfl:     clopidogrel (PLAVIX) 75 mg tablet, TAKE ONE TABLET BY MOUTH ONCE DAILY, Disp: 90 tablet, Rfl: 2    docusate sodium (COLACE) 100 MG capsule, Take 1 capsule (100 mg total) by mouth 2 (two) times daily as needed for Constipation (prn constipation)., Disp: 30 capsule, Rfl: 0    gabapentin (NEURONTIN) 300 MG capsule, Take 1 capsule (300 mg total) by mouth 3 (three) times daily., Disp: 270 capsule, Rfl: 2    hydrALAZINE (APRESOLINE) 10 MG tablet, Take 1 tablet (10 mg total) by mouth 3 (three) times daily., Disp: 90 tablet, Rfl: 11    hydrocodone-acetaminophen 7.5-325mg (NORCO) 7.5-325 mg per tablet, Take 1 tablet by mouth every 6 (six) hours as needed for Pain., Disp: 15 tablet, Rfl: 0    ipratropium (ATROVENT) 0.06 % nasal spray, 2 sprays by Nasal route 4 (four) times daily. As needed for rhinitis, Disp: 15 mL, Rfl: 11    isosorbide mononitrate (IMDUR) 60 MG 24 hr tablet, Take 1 tablet (60 mg total) by mouth 2 (two) times daily., Disp: 180 tablet, Rfl: 3    levocetirizine (XYZAL) 5 MG tablet, Take 1 tablet (5 mg total) by mouth every evening., Disp: 30 tablet, Rfl: 11    metformin (GLUCOPHAGE) 500 MG tablet, Take 500 mg by mouth 2 (two) times daily., Disp: , Rfl:     milk thistle 175 mg tablet, Take 175 mg by mouth once daily., Disp: , Rfl:     nebulizer accessories Kit, Use with nebulizer, Disp: 1 kit, Rfl: prn    nitroGLYCERIN (NITROSTAT) 0.4 MG SL tablet, Place 1 tablet (0.4 mg total) under the tongue every 5 (five) minutes as needed for Chest pain., Disp: 60 tablet, Rfl: 12    pravastatin (PRAVACHOL) 20 MG tablet, TAKE ONE TABLET BY MOUTH ONCE DAILY, Disp: 90 tablet, Rfl: 2    sertraline (ZOLOFT) 100 MG tablet, TAKE ONE TABLET BY MOUTH IN THE EVENING, Disp: 90  "tablet, Rfl: 3    sertraline (ZOLOFT) 50 MG tablet, TAKE ONE TABLET BY MOUTH ONCE DAILY, Disp: 90 tablet, Rfl: 1    UNABLE TO FIND, 1 tablet once daily. MULTIVIT-iron-FA-calcium-mins tab 9mg-iron-400mcg, Disp: , Rfl:       Review of Systems   Constitution: Positive for weakness and malaise/fatigue.   HENT: Negative.    Eyes: Negative.    Cardiovascular: Positive for chest pain and dyspnea on exertion.   Respiratory: Positive for shortness of breath.    Endocrine: Negative.    Hematologic/Lymphatic: Negative.    Skin: Negative.    Musculoskeletal: Positive for joint pain.   Gastrointestinal: Negative.    Genitourinary: Negative.    Neurological: Positive for light-headedness, loss of balance and numbness.   Psychiatric/Behavioral: Negative.    Allergic/Immunologic: Negative.        /80   Pulse 74   Ht 5' 4" (1.626 m)   Wt 100.7 kg (222 lb)   BMI 38.11 kg/m²      Wt Readings from Last 3 Encounters:   09/19/17 100.7 kg (222 lb)   09/08/17 102.1 kg (225 lb 1.4 oz)   08/02/17 102.8 kg (226 lb 10.1 oz)     Temp Readings from Last 3 Encounters:   09/08/17 96.8 °F (36 °C) (Tympanic)   08/02/17 97.5 °F (36.4 °C) (Tympanic)   07/30/17 98.6 °F (37 °C)     BP Readings from Last 3 Encounters:   09/19/17 130/80   09/08/17 (!) 180/90   08/02/17 (!) 146/84     Pulse Readings from Last 3 Encounters:   09/19/17 74   09/08/17 82   08/02/17 80            Objective:    Physical Exam   Constitutional: She is oriented to person, place, and time. Vital signs are normal. She appears well-developed and well-nourished. She is active and cooperative. She does not have a sickly appearance. She does not appear ill. No distress.   HENT:   Head: Normocephalic.   Neck: Neck supple. Normal carotid pulses, no hepatojugular reflux and no JVD present. Carotid bruit is not present. No thyromegaly present.   Cardiovascular: Normal rate, regular rhythm, S1 normal, S2 normal and normal pulses.  PMI is not displaced.  Exam reveals no gallop and " no friction rub.    Murmur heard.   Medium-pitched midsystolic murmur is present with a grade of 1/6  at the upper left sternal border  Pulses:       Radial pulses are 2+ on the right side, and 2+ on the left side.   Pulmonary/Chest: Effort normal and breath sounds normal. She has no wheezes. She has no rales.   Abdominal: Soft. Normal appearance, normal aorta and bowel sounds are normal. She exhibits no pulsatile liver, no abdominal bruit, no ascites and no mass. There is no splenomegaly or hepatomegaly. There is no tenderness.   Musculoskeletal: She exhibits edema.   Lymphadenopathy:     She has no cervical adenopathy.   Neurological: She is alert and oriented to person, place, and time.   Skin: Skin is warm. She is not diaphoretic.   Psychiatric: She has a normal mood and affect. Her behavior is normal.   Nursing note and vitals reviewed.      I have reviewed all pertinent labs and cardiac studies.      Chemistry        Component Value Date/Time     07/30/2017 1700    K 4.4 07/30/2017 1700     07/30/2017 1700    CO2 29 07/30/2017 1700    BUN 18 07/30/2017 1700    CREATININE 0.8 07/30/2017 1700    GLU 99 07/30/2017 1700        Component Value Date/Time    CALCIUM 9.3 07/30/2017 1700    ALKPHOS 66 03/15/2017 1020    AST 18 03/15/2017 1020    ALT 12 03/15/2017 1020    BILITOT 0.6 03/15/2017 1020    ESTGFRAFRICA >60 07/30/2017 1700    EGFRNONAA >60 07/30/2017 1700        Lab Results   Component Value Date    WBC 8.30 07/30/2017    HGB 12.1 07/30/2017    HCT 37.5 07/30/2017    MCV 85 07/30/2017     07/30/2017     Lab Results   Component Value Date    HGBA1C 6.1 03/29/2017     Lab Results   Component Value Date    CHOL 168 07/19/2016    CHOL 159 04/12/2016    CHOL 151 06/17/2014     Lab Results   Component Value Date    HDL 54 07/19/2016    HDL 58 04/12/2016    HDL 61 06/17/2014     Lab Results   Component Value Date    LDLCALC 92.0 07/19/2016    LDLCALC 75.8 04/12/2016    LDLCALC 69.4 06/17/2014      Lab Results   Component Value Date    TRIG 110 07/19/2016    TRIG 126 04/12/2016    TRIG 103 06/17/2014     Lab Results   Component Value Date    CHOLHDL 32.1 07/19/2016    CHOLHDL 36.5 04/12/2016    CHOLHDL 40.4 06/17/2014       RIGHT  The right Mid Common Carotid Artery is visualized.   The right carotid bulb artery is visualized, associated with heterogeneous plaque.   The right Mid Internal Carotid Artery has 20 - 39% stenosis.   There is acceleration in the right external carotid artery.   The right vertebral artery is visualized, associated with retrograde flow.   The right ICA/CCA ratio is: 1.16    LEFT  The left Distal Common Carotid Artery is visualized.   The left carotid bulb artery is visualized.   The left Distal Internal Carotid Artery has 50 - 59% stenosis, associated with tortuosity.   There is acceleration in the left external carotid artery.   The left vertebral artery is visualized, associated with anterograde flow.   The left ICA/CCA ratio is: 2.32      CONCLUSIONS   There is 20 - 39% right Internal Carotid stenosis.  There is 50 - 59% left Internal Carotid stenosis.    Slight systolic flow reversal noted RT VA.        This document has been electronically    SIGNED BY: Castillo Oshea MD On: 07/25/2017 08:30      TEST DESCRIPTION   RIGHT  subclavian 350 cm/sec  Axillary artery 111 cm/sec  Brachial artery 135 cm/sec  Radial artery 83 cm/sec  Ulnar artery 111 cm/sec    The right WBI is 1.0      COMPLICATIONS   Right subclavian 50 to 99% lesion.   Normal WBI.        This document has been electronically    SIGNED BY: Castillo Oshea MD On: 07/25/2017 10:26        Assessment:       1. Essential hypertension    2. Stenosis of right subclavian artery    3. Asymptomatic stenosis of both carotid arteries without infarction    4. Controlled type 2 diabetes mellitus without complication, without long-term current use of insulin    5. AP (angina pectoris)    6. Mixed hyperlipidemia    7. Carotid artery disease  without cerebral infarction    8. CAD in native artery    9. History of PTCA    10. Diastolic dysfunction         Plan:             - stable chronic CAD/anginal sxs.  - stable other chronic CV conditions.  - continue optimal medical tx for CAD.    - restart Atenolol 25 mg nightly; has been off for a while.   May help with her chronic anginal sxs.  - right subclavian stenosis:  Pt deemed to be asymptomatic, 2+ right radial pulse.  Monitor for now.  Pt counseled on subclavian stenosis sxs.  - stay on current HTN med doses. Extra hydraline pill prn for high BP readings ok.  - fall risk precautions discussed.  - cardiac diet    F/u 4 months.

## 2017-09-26 ENCOUNTER — TELEPHONE (OUTPATIENT)
Dept: INTERNAL MEDICINE | Facility: CLINIC | Age: 82
End: 2017-09-26

## 2017-09-26 NOTE — TELEPHONE ENCOUNTER
----- Message from Stephanie Maza sent at 9/26/2017 11:57 AM CDT -----  needs to know if necessary to get bone density..[phone] (home)

## 2017-09-27 ENCOUNTER — TELEPHONE (OUTPATIENT)
Dept: RADIOLOGY | Facility: HOSPITAL | Age: 82
End: 2017-09-27

## 2017-09-28 ENCOUNTER — APPOINTMENT (OUTPATIENT)
Dept: RADIOLOGY | Facility: CLINIC | Age: 82
End: 2017-09-28
Attending: FAMILY MEDICINE
Payer: MEDICARE

## 2017-09-28 DIAGNOSIS — M89.9 BONE DISORDER: ICD-10-CM

## 2017-09-28 PROCEDURE — 77080 DXA BONE DENSITY AXIAL: CPT | Mod: 26,,, | Performed by: RADIOLOGY

## 2017-09-28 PROCEDURE — 77080 DXA BONE DENSITY AXIAL: CPT | Mod: TC,PO

## 2017-10-06 DIAGNOSIS — M81.0 OSTEOPOROSIS, UNSPECIFIED OSTEOPOROSIS TYPE, UNSPECIFIED PATHOLOGICAL FRACTURE PRESENCE: Primary | ICD-10-CM

## 2017-11-07 RX ORDER — SERTRALINE HYDROCHLORIDE 100 MG/1
100 TABLET, FILM COATED ORAL NIGHTLY
Qty: 90 TABLET | Refills: 0 | Status: SHIPPED | OUTPATIENT
Start: 2017-11-07 | End: 2018-01-30 | Stop reason: SDUPTHER

## 2017-11-07 RX ORDER — GABAPENTIN 300 MG/1
300 CAPSULE ORAL 3 TIMES DAILY
Qty: 270 CAPSULE | Refills: 0 | Status: SHIPPED | OUTPATIENT
Start: 2017-11-07 | End: 2017-11-13 | Stop reason: SDUPTHER

## 2017-11-07 NOTE — TELEPHONE ENCOUNTER
----- Message from Olga Lidia Daniel sent at 11/7/2017 11:11 AM CST -----  Contact: patient  Calling concerning getting renewals of the following medications, also want to use pharmacy listed below, please. Thanks, melissa    1. What is the name of the medication you are requesting? Sertraline, Gabapentin    2. What is the dose? 150 mg and 300 mg    3. How do you take the medication? Orally, topically, etc? Oral both    4. How often do you take this medication? 1 nightly and 2 @ AM 1 @ noon 2 @ PM    5. Do you need a 30 day or 90 day supply? 90 day both    6. How many refills are you requesting? 1    7. What is your preferred pharmacy and location of the pharmacy?     [facility] @ [address]  Phone# [phone]  (PLEASE ADD THIS PHARMACIST & TAKE OFF THE OTHER PLEASE) per patient    8. Who can we contact with further questions? Patient @ [phone]. Thanks, melissa

## 2017-11-08 ENCOUNTER — TELEPHONE (OUTPATIENT)
Dept: INTERNAL MEDICINE | Facility: CLINIC | Age: 82
End: 2017-11-08

## 2017-11-08 NOTE — TELEPHONE ENCOUNTER
Patient left message for medication refill and it was sent to Dr. Anne. Patient stated that prescriptions are wrong. The zoloft 150 mg once a day and a prescription was sent in for 100 mg daily. Also she was informed that the gabapentin 300 mg she was told she could take 2 tablets in the morning 1 tablet at noon and 2 tablets at night. The prescription that was sent in does not reflect that. Please advise.

## 2017-11-13 RX ORDER — SERTRALINE HYDROCHLORIDE 50 MG/1
50 TABLET, FILM COATED ORAL DAILY
Qty: 90 TABLET | Refills: 2 | Status: SHIPPED | OUTPATIENT
Start: 2017-11-13 | End: 2018-03-05 | Stop reason: SDUPTHER

## 2017-11-13 RX ORDER — GABAPENTIN 300 MG/1
CAPSULE ORAL
Qty: 150 CAPSULE | Refills: 3 | Status: SHIPPED | OUTPATIENT
Start: 2017-11-13 | End: 2018-02-08 | Stop reason: SDUPTHER

## 2018-01-15 ENCOUNTER — PATIENT OUTREACH (OUTPATIENT)
Dept: ADMINISTRATIVE | Facility: HOSPITAL | Age: 83
End: 2018-01-15

## 2018-01-15 ENCOUNTER — OFFICE VISIT (OUTPATIENT)
Dept: INTERNAL MEDICINE | Facility: CLINIC | Age: 83
End: 2018-01-15
Payer: MEDICARE

## 2018-01-15 VITALS
BODY MASS INDEX: 38.99 KG/M2 | HEIGHT: 64 IN | WEIGHT: 228.38 LBS | OXYGEN SATURATION: 92 % | TEMPERATURE: 97 F | SYSTOLIC BLOOD PRESSURE: 135 MMHG | DIASTOLIC BLOOD PRESSURE: 78 MMHG | HEART RATE: 74 BPM

## 2018-01-15 DIAGNOSIS — G89.4 CHRONIC PAIN SYNDROME: ICD-10-CM

## 2018-01-15 DIAGNOSIS — E78.2 MIXED HYPERLIPIDEMIA: ICD-10-CM

## 2018-01-15 DIAGNOSIS — J44.9 CHRONIC OBSTRUCTIVE PULMONARY DISEASE, UNSPECIFIED COPD TYPE: Primary | ICD-10-CM

## 2018-01-15 DIAGNOSIS — I77.1 STENOSIS OF RIGHT SUBCLAVIAN ARTERY: ICD-10-CM

## 2018-01-15 DIAGNOSIS — E11.9 CONTROLLED TYPE 2 DIABETES MELLITUS WITHOUT COMPLICATION, WITHOUT LONG-TERM CURRENT USE OF INSULIN: ICD-10-CM

## 2018-01-15 DIAGNOSIS — C18.9 MALIGNANT NEOPLASM OF COLON, UNSPECIFIED PART OF COLON: ICD-10-CM

## 2018-01-15 PROCEDURE — 99214 OFFICE O/P EST MOD 30 MIN: CPT | Mod: S$GLB,,, | Performed by: FAMILY MEDICINE

## 2018-01-15 PROCEDURE — 99999 PR PBB SHADOW E&M-EST. PATIENT-LVL IV: CPT | Mod: PBBFAC,,, | Performed by: FAMILY MEDICINE

## 2018-01-15 RX ORDER — HYDROCODONE BITARTRATE AND ACETAMINOPHEN 7.5; 325 MG/1; MG/1
1 TABLET ORAL EVERY 12 HOURS PRN
Qty: 60 TABLET | Refills: 0 | Status: SHIPPED | OUTPATIENT
Start: 2018-01-15 | End: 2018-08-20

## 2018-01-15 NOTE — PROGRESS NOTES
Subjective:       Patient ID: Maldonado Deleon is a 82 y.o. female.    Chief Complaint: Shortness of Breath and Follow-up    Shortness of Breath   This is a chronic problem. The current episode started more than 1 year ago. The problem occurs constantly. The problem has been waxing and waning. Associated symptoms include PND. Pertinent negatives include no claudication, coryza, fever, leg swelling, neck pain, orthopnea or syncope. Nothing aggravates the symptoms. The patient has no known risk factors for DVT/PE.     Review of Systems   Constitutional: Negative for fever.   HENT: Negative.    Respiratory: Positive for shortness of breath.    Cardiovascular: Positive for PND. Negative for orthopnea, claudication, leg swelling and syncope.   Gastrointestinal: Negative.    Genitourinary: Negative.    Musculoskeletal: Negative for neck pain.   Neurological: Negative.    Hematological: Negative.    Psychiatric/Behavioral: Negative.        Objective:      Physical Exam   Constitutional: She is oriented to person, place, and time. She appears well-developed and well-nourished.   Cardiovascular: Normal rate and regular rhythm.    Pulmonary/Chest: Effort normal. She has no wheezes. She has rhonchi in the right lower field and the left lower field. She has no rales.   Abdominal: Soft. Bowel sounds are normal.   Feet:   Right Foot:   Protective Sensation: 10 sites tested. 6 sites sensed.   Skin Integrity: Positive for callus and dry skin.   Left Foot:   Protective Sensation: 10 sites tested. 6 sites sensed.   Skin Integrity: Positive for callus and dry skin.   Neurological: She is alert and oriented to person, place, and time.   Skin: Skin is warm and dry.       Assessment:       1. Chronic obstructive pulmonary disease, unspecified COPD type    2. Chronic pain syndrome    3. Controlled type 2 diabetes mellitus without complication, without long-term current use of insulin    4. Mixed hyperlipidemia    5. Malignant neoplasm of  colon, unspecified part of colon    6. Stenosis of right subclavian artery        Plan:       Chronic obstructive pulmonary disease, unspecified COPD type  Comments:  Pt can not afford the Advair or Symbicort or breo. Recommend continue to consult Pulmonary as not clear next step except use albuterol will consult pharmacy pt rep to see if can help    Chronic pain syndrome  Comments:  Will start pt on Norco 7.5 mg    Controlled type 2 diabetes mellitus without complication, without long-term current use of insulin  Comments:  Will get a HgA1C  Orders:  -     Hemoglobin A1c; Future; Expected date: 01/29/2018  -     Ambulatory referral to Optometry    Mixed hyperlipidemia  Comments:  Will recheck her lipid panel  Orders:  -     Lipid panel; Future    Malignant neoplasm of colon, unspecified part of colon    Stenosis of right subclavian artery    Other orders  -     hydrocodone-acetaminophen 7.5-325mg (NORCO) 7.5-325 mg per tablet; Take 1 tablet by mouth every 12 (twelve) hours as needed for Pain.  Dispense: 60 tablet; Refill: 0

## 2018-01-16 ENCOUNTER — TELEPHONE (OUTPATIENT)
Dept: INTERNAL MEDICINE | Facility: CLINIC | Age: 83
End: 2018-01-16

## 2018-01-16 ENCOUNTER — LAB VISIT (OUTPATIENT)
Dept: LAB | Facility: HOSPITAL | Age: 83
End: 2018-01-16
Attending: FAMILY MEDICINE
Payer: MEDICARE

## 2018-01-16 ENCOUNTER — TELEPHONE (OUTPATIENT)
Dept: PHARMACY | Facility: CLINIC | Age: 83
End: 2018-01-16

## 2018-01-16 DIAGNOSIS — E11.9 CONTROLLED TYPE 2 DIABETES MELLITUS WITHOUT COMPLICATION, WITHOUT LONG-TERM CURRENT USE OF INSULIN: ICD-10-CM

## 2018-01-16 DIAGNOSIS — E78.2 MIXED HYPERLIPIDEMIA: ICD-10-CM

## 2018-01-16 LAB
CHOLEST SERPL-MCNC: 192 MG/DL
CHOLEST/HDLC SERPL: 3.1 {RATIO}
ESTIMATED AVG GLUCOSE: 114 MG/DL
HBA1C MFR BLD HPLC: 5.6 %
HDLC SERPL-MCNC: 61 MG/DL
HDLC SERPL: 31.8 %
LDLC SERPL CALC-MCNC: 108 MG/DL
NONHDLC SERPL-MCNC: 131 MG/DL
TRIGL SERPL-MCNC: 115 MG/DL

## 2018-01-16 PROCEDURE — 80061 LIPID PANEL: CPT

## 2018-01-16 PROCEDURE — 83036 HEMOGLOBIN GLYCOSYLATED A1C: CPT

## 2018-01-16 PROCEDURE — 36415 COLL VENOUS BLD VENIPUNCTURE: CPT | Mod: PO

## 2018-01-16 NOTE — TELEPHONE ENCOUNTER
Tried to contact patient concerning referral for medication.  Left message for patient to return my call.

## 2018-01-16 NOTE — PROGRESS NOTES
Patient, Maldonado Deleon (MRN #[id number]), presented with a recorded BMI of 39.2 kg/m^2 and a documented comorbidity(s):  - Diabetes Mellitus Type 2  - Hyperlipidemia  to which the severe obesity is a contributing factor. This is consistent with the definition of severe obesity (BMI 35.0-35.9) with comorbidity (ICD-10 E66.01, Z68.35). The patient's severe obesity was monitored, evaluated, addressed and/or treated. This addendum to the medical record is made on 01/16/2018.

## 2018-01-29 ENCOUNTER — OFFICE VISIT (OUTPATIENT)
Dept: CARDIOLOGY | Facility: CLINIC | Age: 83
End: 2018-01-29
Payer: MEDICARE

## 2018-01-29 VITALS
SYSTOLIC BLOOD PRESSURE: 152 MMHG | DIASTOLIC BLOOD PRESSURE: 84 MMHG | HEART RATE: 64 BPM | HEIGHT: 64 IN | BODY MASS INDEX: 38.73 KG/M2 | WEIGHT: 226.88 LBS

## 2018-01-29 DIAGNOSIS — I77.9 CAROTID ARTERY DISEASE WITHOUT CEREBRAL INFARCTION: ICD-10-CM

## 2018-01-29 DIAGNOSIS — E78.2 MIXED HYPERLIPIDEMIA: ICD-10-CM

## 2018-01-29 DIAGNOSIS — I77.1 STENOSIS OF RIGHT SUBCLAVIAN ARTERY: ICD-10-CM

## 2018-01-29 DIAGNOSIS — E11.9 CONTROLLED TYPE 2 DIABETES MELLITUS WITHOUT COMPLICATION, WITHOUT LONG-TERM CURRENT USE OF INSULIN: ICD-10-CM

## 2018-01-29 DIAGNOSIS — I10 ESSENTIAL HYPERTENSION: ICD-10-CM

## 2018-01-29 DIAGNOSIS — R51.9 PERSISTENT HEADACHES: ICD-10-CM

## 2018-01-29 DIAGNOSIS — I65.23 ASYMPTOMATIC STENOSIS OF BOTH CAROTID ARTERIES WITHOUT INFARCTION: ICD-10-CM

## 2018-01-29 DIAGNOSIS — I25.10 CAD IN NATIVE ARTERY: Primary | ICD-10-CM

## 2018-01-29 DIAGNOSIS — Z98.61 HISTORY OF PTCA: ICD-10-CM

## 2018-01-29 DIAGNOSIS — J44.9 CHRONIC OBSTRUCTIVE PULMONARY DISEASE, UNSPECIFIED COPD TYPE: ICD-10-CM

## 2018-01-29 DIAGNOSIS — I25.10 ATHEROSCLEROSIS OF NATIVE CORONARY ARTERY OF NATIVE HEART WITHOUT ANGINA PECTORIS: ICD-10-CM

## 2018-01-29 DIAGNOSIS — I51.89 DIASTOLIC DYSFUNCTION: ICD-10-CM

## 2018-01-29 PROCEDURE — 99999 PR PBB SHADOW E&M-EST. PATIENT-LVL III: CPT | Mod: PBBFAC,,, | Performed by: INTERNAL MEDICINE

## 2018-01-29 PROCEDURE — 99214 OFFICE O/P EST MOD 30 MIN: CPT | Mod: S$GLB,,, | Performed by: INTERNAL MEDICINE

## 2018-01-29 RX ORDER — ROSUVASTATIN CALCIUM 20 MG/1
20 TABLET, COATED ORAL NIGHTLY
Qty: 90 TABLET | Refills: 3 | Status: SHIPPED | OUTPATIENT
Start: 2018-01-29 | End: 2019-01-02 | Stop reason: SDUPTHER

## 2018-01-29 NOTE — PROGRESS NOTES
Subjective:    Patient ID:  Maldonado Deleon is a 82 y.o. female who presents for evaluation of Shortness of Breath; Edema; Coronary Artery Disease; Hyperlipidemia; Hypertension; Chest Pain; and Peripheral Arterial Disease      HPI Mrs. Deleon returns for f/u.   Her current medical conditions include COPD, R subclavian stenosis, carotid artery disease, hypertension, hyperlipidemia, diabetes (former smoker) and CAD. Nonsmoker.  On home O2 qhs.   s/p C 6/15 for anginal sxs and had PCI of calcified 90% mid-RCA stenosis with Diamondback atherectomy and LINDSAY x one. Her mid-LAD stenosis was overal stable in 50 - 60% range and medical mgt advised.  She is here for f/u.  Has been having headaches, saw PCP and prescribed meds but some confusion on her med and she is going to see or call PCP again on this issue.  Has been off balance for some time.  Had a few falls last summer, none since.  Has chronic COTTER, she thinks it may be worsening and plans to see Pulmonary.  Inhalers help.  She has chronic angina, takes sl ntg on occasion, but states she doesn't take it very often.  1 - 2 sl ntg usually alleviates her cp and she states it is usually stress related.  No cp with walking.  More with stress in life.  BP controlled overall, variable.  Did not take am bp meds yet today.  Lipids have worsened, has not eaten well. On pravastatin.  DM is well controlled and HGAIC is at goal.  Compliant with meds.    Patient Active Problem List   Diagnosis    Colon cancer    Anemia due to chronic blood loss    Iron deficiency anemia, unspecified    Diaphragmatic hernia without obstruction and without gangrene    Chronic obstructive pulmonary disease    Abnormal CXR (chest x-ray)    CAD in native artery    History of PTCA    Carotid artery disease without cerebral infarction    Hypertension    Mixed hyperlipidemia    Neck pain    Lung nodule - Hamartoma    Sinus congestion    Chronic pain syndrome    Intractable headache    Left  "knee pain    Left leg swelling    Anxiety    Atherosclerosis of native coronary artery without angina pectoris    Diabetes type 2, controlled    Controlled type 2 diabetes mellitus without complication, without long-term current use of insulin    Left hip pain    Diastolic dysfunction    Arthritis of left hip    Vasomotor rhinitis    Anemia    Increased frequency of urination    Nausea    Asymptomatic stenosis of both carotid arteries without infarction    Stenosis of right subclavian artery    Dizziness    Neuropathy    Closed fracture of nasal bone    Bone disorder    Persistent headaches       Review of Systems   Constitution: Negative.   HENT: Negative.    Eyes: Negative.    Cardiovascular: Positive for chest pain and dyspnea on exertion.   Respiratory: Positive for cough and shortness of breath.    Endocrine: Negative.    Hematologic/Lymphatic: Negative.    Skin: Negative.    Musculoskeletal: Positive for arthritis and joint pain.   Gastrointestinal: Negative.    Genitourinary: Negative.    Neurological: Positive for headaches and loss of balance.   Psychiatric/Behavioral: Negative.    Allergic/Immunologic: Negative.        BP (!) 152/84 (BP Location: Left arm)   Pulse 64   Ht 5' 4" (1.626 m)   Wt 102.9 kg (226 lb 13.7 oz)   BMI 38.94 kg/m²     Wt Readings from Last 3 Encounters:   01/29/18 102.9 kg (226 lb 13.7 oz)   01/15/18 103.6 kg (228 lb 6.3 oz)   09/19/17 100.7 kg (222 lb)     Temp Readings from Last 3 Encounters:   01/15/18 96.6 °F (35.9 °C) (Tympanic)   09/08/17 96.8 °F (36 °C) (Tympanic)   08/02/17 97.5 °F (36.4 °C) (Tympanic)     BP Readings from Last 3 Encounters:   01/29/18 (!) 152/84   01/15/18 135/78   09/19/17 130/80     Pulse Readings from Last 3 Encounters:   01/29/18 64   01/15/18 74   09/19/17 74          Objective:    Physical Exam   Constitutional: She is oriented to person, place, and time. Vital signs are normal. She appears well-developed and well-nourished. She " is active and cooperative. She does not have a sickly appearance. She does not appear ill. No distress.   HENT:   Head: Normocephalic.   Neck: Neck supple. Normal carotid pulses, no hepatojugular reflux and no JVD present. Carotid bruit is not present. No thyromegaly present.   Cardiovascular: Normal rate, regular rhythm, S1 normal, S2 normal, normal heart sounds and normal pulses.  PMI is not displaced.  Exam reveals no gallop and no friction rub.    No murmur heard.  Pulses:       Radial pulses are 2+ on the right side, and 2+ on the left side.   Pulmonary/Chest: Effort normal and breath sounds normal. She has no wheezes. She has no rales.   Abdominal: Soft. Normal appearance, normal aorta and bowel sounds are normal. She exhibits no pulsatile liver, no abdominal bruit, no ascites and no mass. There is no splenomegaly or hepatomegaly. There is no tenderness.   Musculoskeletal: She exhibits no edema.   Lymphadenopathy:     She has no cervical adenopathy.   Neurological: She is alert and oriented to person, place, and time.   Skin: Skin is warm. She is not diaphoretic.   Psychiatric: She has a normal mood and affect. Her behavior is normal.   Nursing note and vitals reviewed.      I have reviewed all pertinent labs and cardiac studies.      Chemistry        Component Value Date/Time     07/30/2017 1700    K 4.4 07/30/2017 1700     07/30/2017 1700    CO2 29 07/30/2017 1700    BUN 18 07/30/2017 1700    CREATININE 0.8 07/30/2017 1700    GLU 99 07/30/2017 1700        Component Value Date/Time    CALCIUM 9.3 07/30/2017 1700    ALKPHOS 66 03/15/2017 1020    AST 18 03/15/2017 1020    ALT 12 03/15/2017 1020    BILITOT 0.6 03/15/2017 1020    ESTGFRAFRICA >60 07/30/2017 1700    EGFRNONAA >60 07/30/2017 1700        Lab Results   Component Value Date    WBC 8.30 07/30/2017    HGB 12.1 07/30/2017    HCT 37.5 07/30/2017    MCV 85 07/30/2017     07/30/2017     Lab Results   Component Value Date    HGBA1C 5.6  01/16/2018     Lab Results   Component Value Date    CHOL 192 01/16/2018    CHOL 168 07/19/2016    CHOL 159 04/12/2016     Lab Results   Component Value Date    HDL 61 01/16/2018    HDL 54 07/19/2016    HDL 58 04/12/2016     Lab Results   Component Value Date    LDLCALC 108.0 01/16/2018    LDLCALC 92.0 07/19/2016    LDLCALC 75.8 04/12/2016     Lab Results   Component Value Date    TRIG 115 01/16/2018    TRIG 110 07/19/2016    TRIG 126 04/12/2016     Lab Results   Component Value Date    CHOLHDL 31.8 01/16/2018    CHOLHDL 32.1 07/19/2016    CHOLHDL 36.5 04/12/2016           Assessment:       1. CAD in native artery    2. History of PTCA    3. Chronic obstructive pulmonary disease, unspecified COPD type    4. Stenosis of right subclavian artery    5. Asymptomatic stenosis of both carotid arteries without infarction    6. Diastolic dysfunction    7. Controlled type 2 diabetes mellitus without complication, without long-term current use of insulin    8. Atherosclerosis of native coronary artery of native heart without angina pectoris    9. Mixed hyperlipidemia    10. Essential hypertension    11. Carotid artery disease without cerebral infarction    12. Persistent headaches         Plan:             - Stable CV conditions on current medical tx.  - Chronic stable angina, controlled on current tx.  Sl ntg prn ok for angina.  - Continue optimal medical tx for CAD.  - Recommend Neurology consultation for persistent headaches.  - F/u with Pulmonary for COPD related dyspnea.  - Pt counseled on cardiac diet to lower lipids.  - Change Pravastatin to Rosuvastatin 20 mg nightly, recheck lipids 3 months.  - Asymptomatic subclavian stenosis, normal pulse right radial.  Monitor.      F/u 6 months with carotid u/s.

## 2018-01-30 RX ORDER — SERTRALINE HYDROCHLORIDE 50 MG/1
TABLET, FILM COATED ORAL
Qty: 90 TABLET | Refills: 1 | Status: SHIPPED | OUTPATIENT
Start: 2018-01-30 | End: 2019-01-22 | Stop reason: SDUPTHER

## 2018-01-30 RX ORDER — SERTRALINE HYDROCHLORIDE 100 MG/1
TABLET, FILM COATED ORAL
Qty: 90 TABLET | Refills: 3 | Status: SHIPPED | OUTPATIENT
Start: 2018-01-30 | End: 2018-12-10 | Stop reason: SDUPTHER

## 2018-02-08 RX ORDER — GABAPENTIN 300 MG/1
CAPSULE ORAL
Qty: 150 CAPSULE | Refills: 3 | Status: SHIPPED | OUTPATIENT
Start: 2018-02-08 | End: 2018-10-07 | Stop reason: SDUPTHER

## 2018-02-08 NOTE — TELEPHONE ENCOUNTER
----- Message from Philly Redd sent at 2/8/2018 12:40 PM CST -----  Contact: pt  States she's calling rg needing a refill and can be reached at [phone]     1. What is the name of the medication you are requesting? Gabapentin   2. What is the dose? 300mg   3. How do you take the medication? Orally, topically, etc? Orally   4. How often do you take this medication? 2 times in morning, once at noon and twice at night( needs to be on the rx)  5. Do you need a 30 day or 90 day supply? 90 days  6. How many refills are you requesting?  7. What is your preferred pharmacy and location of the pharmacy?   8. Who can we contact with further questions? Pt    [address] [facility]  [address] [facility] [address]  Phone: [phone] Fax: [phone]

## 2018-02-20 ENCOUNTER — HOSPITAL ENCOUNTER (OUTPATIENT)
Dept: RADIOLOGY | Facility: HOSPITAL | Age: 83
Discharge: HOME | End: 2018-02-20
Attending: INTERNAL MEDICINE
Payer: MEDICARE

## 2018-02-20 ENCOUNTER — OFFICE VISIT (OUTPATIENT)
Dept: PULMONOLOGY | Facility: CLINIC | Age: 83
End: 2018-02-20
Payer: MEDICARE

## 2018-02-20 VITALS
HEART RATE: 72 BPM | HEIGHT: 64 IN | DIASTOLIC BLOOD PRESSURE: 64 MMHG | OXYGEN SATURATION: 90 % | BODY MASS INDEX: 38.35 KG/M2 | WEIGHT: 224.63 LBS | SYSTOLIC BLOOD PRESSURE: 158 MMHG | RESPIRATION RATE: 18 BRPM

## 2018-02-20 DIAGNOSIS — J30.0 CHRONIC VASOMOTOR RHINITIS: ICD-10-CM

## 2018-02-20 DIAGNOSIS — J44.1 COPD EXACERBATION: Primary | ICD-10-CM

## 2018-02-20 DIAGNOSIS — J44.9 CHRONIC OBSTRUCTIVE PULMONARY DISEASE, UNSPECIFIED COPD TYPE: ICD-10-CM

## 2018-02-20 DIAGNOSIS — J44.1 COPD EXACERBATION: ICD-10-CM

## 2018-02-20 PROCEDURE — 3008F BODY MASS INDEX DOCD: CPT | Mod: S$GLB,,, | Performed by: INTERNAL MEDICINE

## 2018-02-20 PROCEDURE — 1126F AMNT PAIN NOTED NONE PRSNT: CPT | Mod: S$GLB,,, | Performed by: INTERNAL MEDICINE

## 2018-02-20 PROCEDURE — 1159F MED LIST DOCD IN RCRD: CPT | Mod: S$GLB,,, | Performed by: INTERNAL MEDICINE

## 2018-02-20 PROCEDURE — 99999 PR PBB SHADOW E&M-EST. PATIENT-LVL IV: CPT | Mod: PBBFAC,,, | Performed by: INTERNAL MEDICINE

## 2018-02-20 PROCEDURE — 71046 X-RAY EXAM CHEST 2 VIEWS: CPT | Mod: 26,,, | Performed by: RADIOLOGY

## 2018-02-20 PROCEDURE — 71046 X-RAY EXAM CHEST 2 VIEWS: CPT | Mod: TC,FY,PO

## 2018-02-20 PROCEDURE — 99214 OFFICE O/P EST MOD 30 MIN: CPT | Mod: S$GLB,,, | Performed by: INTERNAL MEDICINE

## 2018-02-20 RX ORDER — GUAIFENESIN 600 MG/1
1200 TABLET, EXTENDED RELEASE ORAL 2 TIMES DAILY
Qty: 60 TABLET | Status: SHIPPED | OUTPATIENT
Start: 2018-02-20 | End: 2018-03-02

## 2018-02-20 RX ORDER — PROMETHAZINE HYDROCHLORIDE AND CODEINE PHOSPHATE 6.25; 1 MG/5ML; MG/5ML
5 SOLUTION ORAL EVERY 4 HOURS PRN
Qty: 240 ML | Refills: 0 | Status: SHIPPED | OUTPATIENT
Start: 2018-02-20 | End: 2018-03-02

## 2018-02-20 RX ORDER — IPRATROPIUM BROMIDE 42 UG/1
2 SPRAY, METERED NASAL 4 TIMES DAILY
Qty: 15 ML | Refills: 11 | Status: SHIPPED | OUTPATIENT
Start: 2018-02-20 | End: 2019-02-25 | Stop reason: SDUPTHER

## 2018-02-20 RX ORDER — IPRATROPIUM BROMIDE AND ALBUTEROL SULFATE 2.5; .5 MG/3ML; MG/3ML
3 SOLUTION RESPIRATORY (INHALATION) EVERY 6 HOURS
Qty: 120 VIAL | Refills: 12 | Status: SHIPPED | OUTPATIENT
Start: 2018-02-20 | End: 2018-08-23 | Stop reason: SDUPTHER

## 2018-02-20 RX ORDER — AZITHROMYCIN 250 MG/1
250 TABLET, FILM COATED ORAL DAILY
Qty: 6 TABLET | Refills: 1 | Status: SHIPPED | OUTPATIENT
Start: 2018-02-20 | End: 2018-08-23 | Stop reason: ALTCHOICE

## 2018-02-20 RX ORDER — PREDNISONE 20 MG/1
TABLET ORAL
Qty: 20 TABLET | Refills: 1 | Status: SHIPPED | OUTPATIENT
Start: 2018-02-20 | End: 2018-08-23 | Stop reason: ALTCHOICE

## 2018-02-20 NOTE — PATIENT INSTRUCTIONS
Please read Warning before using.    USE ONLY AS DIRECTED, IF SYMPTOMS PERSIST SEE YOUR DOCTOR/HEALTHCARE PROFESSIONAL. ALWAYS READ THE LABEL. IMPORTANT: NeilMed® SINUS RINSE Mixture Packets should be used with NeilMed® 240 mL (8 fl oz) NASAFLO® to achieve the best results. You may use NeilMed® packets with other irrigation devices, as long as you mix with the correct volume of water. Our recommendation is to replace Neti Pot every three months.  Step 1  Please wash your hands and rinse the device. Fill the NASAFLO® with 240 mL (8 fl oz) of lukewarm distilled, filtered or previously boiled water. Please do not use tap or faucet water to dissolve the mixture unless it has been previously boiled and cooled down. You may warm the water in a microwave, but we recommend that you warm it in increments of 5 to 10 seconds to avoid overheating, damaging the device or scalding your nasal passage. Step 2  Cut the SINUS RINSE mixture packet at the corner and pour contents into the pot. Tighten the lid on the device securely. Place one finger over the hole of the cap and shake the device gently to dissolve the mixture. Step 3  Standing in front of a sink, bend forward to your comfort level and tilt your head to one side. Keeping your mouth open, and without holding your breath, apply the tip of the device snugly against your nasal passage and ALLOW THE SOLUTION TO GENTLY FLOW until the solution starts draining from the opposite nasal passage. Use roughly half the solution in the NASAFLO® (120 mL / 4 fl oz).  It should not enter your mouth unless you are tilting your head backwards. To adjust or stop the flow, you may place your finger over the hole of the cap, and depending on the seal, you may be able to control the flow. Step 4  Blow your nose very gently, without pinching nose completely to avoid pressure on eardrums. If tolerable, sniff in gently any residual solution remaining in the nasal passage once or twice, because  this may clean out the posterior nasopharyngeal area, which is the area at the back of your nasal passage. At times, some solution will reach the back of your throat, so please spit it out.  For NASAFLO® users, to help drain any residual solution, blow your nose gently while tilting your head forward and to the same side of the nasal passage you just rinsed. Step 5  Now repeat steps 3 & 4 on your other nasal passage.  If there is any solution left over, please discard it. We recommend you make a fresh solution each time you rinse. Rinse once or twice daily OR as directed by your physician. Saline is considered safe.  The [place] FDA is recommending that common cough and cold over the counter medicines not be given to babies and toddlers, and that antihistamines should not be given to children under age 6. NeilMed® NASAFLO®: Please clean with soap & water and let air dry Please read Warning before using.    Our recommendation is to replace Neti Pot every three months.      Azithromycin tablets  What is this medicine?  AZITHROMYCIN (az ith atif MYE sin) is a macrolide antibiotic. It is used to treat or prevent certain kinds of bacterial infections. It will not work for colds, flu, or other viral infections.  How should I use this medicine?  Take this medicine by mouth with a full glass of water. Follow the directions on the prescription label. The tablets can be taken with food or on an empty stomach. If the medicine upsets your stomach, take it with food. Take your medicine at regular intervals. Do not take your medicine more often than directed. Take all of your medicine as directed even if you think your are better. Do not skip doses or stop your medicine early. Talk to your pediatrician regarding the use of this medicine in children. Special care may be needed.  What side effects may I notice from receiving this medicine?  Side effects that you should report to your doctor or health care professional as soon as  possible:  · allergic reactions like skin rash, itching or hives, swelling of the face, lips, or tongue  · confusion, nightmares or hallucinations  · dark urine  · difficulty breathing  · hearing loss  · irregular heartbeat or chest pain  · pain or difficulty passing urine  · redness, blistering, peeling or loosening of the skin, including inside the mouth  · white patches or sores in the mouth  · yellowing of the eyes or skin  Side effects that usually do not require medical attention (report to your doctor or health care professional if they continue or are bothersome):  · diarrhea  · dizziness, drowsiness  · headache  · stomach upset or vomiting  · tooth discoloration  · vaginal irritation  What may interact with this medicine?  Do not take this medicine with any of the following medications:  · lincomycin  This medicine may also interact with the following medications:  · amiodarone  · antacids  · birth control pills  · cyclosporine  · digoxin  · magnesium  · nelfinavir  · phenytoin  · warfarin  What if I miss a dose?  If you miss a dose, take it as soon as you can. If it is almost time for your next dose, take only that dose. Do not take double or extra doses.  Where should I keep my medicine?  Keep out of the reach of children.  Store at room temperature between 15 and 30 degrees C (59 and 86 degrees F). Throw away any unused medicine after the expiration date.  What should I tell my health care provider before I take this medicine?  They need to know if you have any of these conditions:  · kidney disease  · liver disease  · irregular heartbeat or heart disease  · an unusual or allergic reaction to azithromycin, erythromycin, other macrolide antibiotics, foods, dyes, or preservatives  · pregnant or trying to get pregnant  · breast-feeding  What should I watch for while using this medicine?  Tell your doctor or health care professional if your symptoms do not improve.  Do not treat diarrhea with over the  counter products. Contact your doctor if you have diarrhea that lasts more than 2 days or if it is severe and watery.  This medicine can make you more sensitive to the sun. Keep out of the sun. If you cannot avoid being in the sun, wear protective clothing and use sunscreen. Do not use sun lamps or tanning beds/booths.  NOTE:This sheet is a summary. It may not cover all possible information. If you have questions about this medicine, talk to your doctor, pharmacist, or health care provider. Copyright© 2017 [employer]        Prednisone tablets  What is this medicine?  PREDNISONE (PRED ni sone) is a corticosteroid. It is commonly used to treat inflammation of the skin, joints, lungs, and other organs. Common conditions treated include asthma, allergies, and arthritis. It is also used for other conditions, such as blood disorders and diseases of the adrenal glands.  How should I use this medicine?  Take this medicine by mouth with a glass of water. Follow the directions on the prescription label. Take this medicine with food. If you are taking this medicine once a day, take it in the morning. Do not take more medicine than you are told to take. Do not suddenly stop taking your medicine because you may develop a severe reaction. Your doctor will tell you how much medicine to take. If your doctor wants you to stop the medicine, the dose may be slowly lowered over time to avoid any side effects.  Talk to your pediatrician regarding the use of this medicine in children. Special care may be needed.  What side effects may I notice from receiving this medicine?  Side effects that you should report to your doctor or health care professional as soon as possible:  · allergic reactions like skin rash, itching or hives, swelling of the face, lips, or tongue  · changes in emotions or moods  · changes in vision  · depressed mood  · eye pain  · fever or chills, cough, sore throat, pain or difficulty passing urine  · increased  thirst  · swelling of ankles, feet  Side effects that usually do not require medical attention (report to your doctor or health care professional if they continue or are bothersome):  · confusion, excitement, restlessness  · headache  · nausea, vomiting  · skin problems, acne, thin and shiny skin  · trouble sleeping  · weight gain  What may interact with this medicine?  Do not take this medicine with any of the following medications:  · metyrapone  · mifepristone  This medicine may also interact with the following medications:  · aminoglutethimide  · amphotericin B  · aspirin and aspirin-like medicines  · barbiturates  · certain medicines for diabetes, like glipizide or glyburide  · cholestyramine  · cholinesterase inhibitors  · cyclosporine  · digoxin  · diuretics  · ephedrine  · female hormones, like estrogens and birth control pills  · isoniazid  · ketoconazole  · NSAIDS, medicines for pain and inflammation, like ibuprofen or naproxen  · phenytoin  · rifampin  · toxoids  · vaccines  · warfarin  What if I miss a dose?  If you miss a dose, take it as soon as you can. If it is almost time for your next dose, talk to your doctor or health care professional. You may need to miss a dose or take an extra dose. Do not take double or extra doses without advice.  Where should I keep my medicine?  Keep out of the reach of children.  Store at room temperature between 15 and 30 degrees C (59 and 86 degrees F). Protect from light. Keep container tightly closed. Throw away any unused medicine after the expiration date.  What should I tell my health care provider before I take this medicine?  They need to know if you have any of these conditions:  · Cushing's syndrome  · diabetes  · glaucoma  · heart disease  · high blood pressure  · infection (especially a virus infection such as chickenpox, cold sores, or herpes)  · kidney disease  · liver disease  · mental illness  · myasthenia gravis  · osteoporosis  · seizures  · stomach or  intestine problems  · thyroid disease  · an unusual or allergic reaction to lactose, prednisone, other medicines, foods, dyes, or preservatives  · pregnant or trying to get pregnant  · breast-feeding  What should I watch for while using this medicine?  Visit your doctor or health care professional for regular checks on your progress. If you are taking this medicine over a prolonged period, carry an identification card with your name and address, the type and dose of your medicine, and your doctor's name and address.  This medicine may increase your risk of getting an infection. Tell your doctor or health care professional if you are around anyone with measles or chickenpox, or if you develop sores or blisters that do not heal properly.  If you are going to have surgery, tell your doctor or health care professional that you have taken this medicine within the last twelve months.  Ask your doctor or health care professional about your diet. You may need to lower the amount of salt you eat.  This medicine may affect blood sugar levels. If you have diabetes, check with your doctor or health care professional before you change your diet or the dose of your diabetic medicine.  NOTE:This sheet is a summary. It may not cover all possible information. If you have questions about this medicine, talk to your doctor, pharmacist, or health care provider. Copyright© 2017 [employer]        Ipratropium nasal spray  What is this medicine?  IPRATROPIUM (nellie dooley) is used to relieve a runny nose due to seasonal allergies or non allergic causes, like a cold. This medicine does not help with nasal congestion or sneezing.  How should I use this medicine?  This medicine is for use only in the nose. Follow the directions on the prescription label. Do not use more often than directed. Do not share this medicine with anyone else. Make sure that you are using your nasal spray correctly. Ask you doctor or health care provider if  you have any questions.  Talk to your pediatrician regarding the use of this medicine in children. While this drug may be prescribed for children as young as 6 years of age for selected conditions, precautions do apply.  What side effects may I notice from receiving this medicine?  Side effects that you should report to your doctor or health care professional as soon as possible:  · allergic reactions like skin rash, itching or hives, swelling of the face, lips, or tongue or difficulty breathing  · chest pain or fast heartbeat  · dizziness or fainting spell  · eye pain or change in vision  · infection  Side effects that usually do not require medical attention (report to your doctor or health care professional if they continue or are bothersome):  · dry eyes, mouth or nose  · irritation in the nose or throat  · nausea  · nosebleeds  · trouble passing urine  · unusual taste  What may interact with this medicine?  · atropine, hyoscyamine, and related medications  · medicines for motion sickness or dizziness  · medicines for overactive bladder  · some medicines for colds  · some medicines for stomach problems  What if I miss a dose?  If you miss a dose, use it as soon as you can. If it is almost time for your next dose, use only that dose. Do not use double or extra doses.  Where should I keep my medicine?  Keep out of the reach of children.  Store at room temperature between 15 and 30 degrees C (59 and 86 degrees F). Avoid excessive humidity. Do not freeze. Throw away any unused medicine after the expiration date.  What should I tell my health care provider before I take this medicine?  They need to know if you have any of these conditions:  · bladder problems, difficulty passing urine  · glaucoma  · prostate trouble  · an unusual or allergic reaction to ipratropium, atropine, bromides, soya flour or protein, soybeans or peanuts, peanut oil, other medicines, foods, dyes, or preservatives  · pregnant or trying to get  pregnant  · breast-feeding  What should I watch for while using this medicine?  Tell your doctor or health care professional if your symptoms do not improve. Do not use extra medicine. This medicine should start to work within a day or two of treatment.  Do not get this spray in your eyes. It can cause irritation, pain, or blurred vision. If you do get any in your eyes, rinse out with plenty of cool tap water and call your health care provider.  NOTE:This sheet is a summary. It may not cover all possible information. If you have questions about this medicine, talk to your doctor, pharmacist, or health care provider. Copyright© 2017 [employer]

## 2018-02-20 NOTE — PROGRESS NOTES
Subjective:       Patient ID: Maldonado Deleon is a 82 y.o. female.    Chief Complaint: No chief complaint on file.    HPI COPD  She presents for evaluation and treatment of COPD. The patient is currently having symptoms / an exacerbation. Current symptoms include chronic dyspnea, cough productive of yellow sputum in small amounts and wheezing. Symptoms have been present since about a month ago and have been gradually worsening. She denies chills and fever. Associated symptoms include poor exercise tolerance and shortness of breath.  This episode appears to have been triggered by dust. Treatments tried for the current exacerbation: albuterol nebulizer. The patient has been having similar episodes for approximately 10 years. She uses 1 pillows at night. Patient currently is on oxygen at 2 L/min per nasal cannula.. The patient is having no constitutional symptoms, denying fever, chills, anorexia, or weight loss. The patient has been hospitalized for this condition before. She quit smoking approximately many years years ago. The patient is experiencing exercise intolerance (difficulty walking 1 blocks on flat ground).      Sinsus congestion    Dyspnea  Patient complains of shortness of breath. Symptoms occur with less than one block walking with a walker. Symptoms began 5 years ago, gradually worsening since. Associated symptoms include  drainage from nose, dyspnea on exertion, post nasal drip and shortness of breath. She denies chest pain, located left chest. She does not have had recent travel. Weight has been stable. Symptoms are exacerbated by moderate activity. Symptoms are alleviated by rest.          Past Medical History:   Diagnosis Date    Anticoagulant long-term use     Plavix    Atypical chest pain 8/26/2013    Back pain     CAD (coronary artery disease)     CAD in native artery 10/5/2015    Carotid artery disease without cerebral infarction 8/26/2013    Colon cancer     resection and chemo.    COPD  (chronic obstructive pulmonary disease)     Depression     Diabetes mellitus type II     Diaphragmatic hernia without obstruction and without gangrene 8/13/2015    Emphysema of lung     Encounter for blood transfusion     Gallstone pancreatitis     History of PTCA 10/5/2015    Hypertension     Lymphocytic colitis     Meningioma     OA (osteoarthritis)     Obesity 8/26/2013    Oxygen dependent     q hs and prn     Past Surgical History:   Procedure Laterality Date    ABDOMINAL SURGERY      BACK SURGERY      x 2    CATARACT EXTRACTION      CHOLECYSTECTOMY      COLECTOMY      CORONARY ANGIOPLASTY      EYE SURGERY      JOINT REPLACEMENT Right     hip replacement x 4    PARATHYROIDECTOMY      SHOULDER SURGERY Right     TOTAL HIP ARTHROPLASTY Left     TOTAL KNEE ARTHROPLASTY Bilateral      Social History     Social History    Marital status:      Spouse name: N/A    Number of children: N/A    Years of education: N/A     Occupational History    Not on file.     Social History Main Topics    Smoking status: Former Smoker     Packs/day: 2.50     Years: 50.00     Types: Cigarettes     Quit date: 8/7/1987    Smokeless tobacco: Never Used    Alcohol use No    Drug use: No    Sexual activity: No     Other Topics Concern    Not on file     Social History Narrative    No narrative on file     Review of Systems   Constitutional: Positive for fatigue. Negative for fever.   HENT: Positive for postnasal drip, rhinorrhea and congestion.    Eyes: Negative for redness and itching.   Respiratory: Positive for cough, sputum production, shortness of breath, dyspnea on extertion, use of rescue inhaler and Paroxysmal Nocturnal Dyspnea.    Cardiovascular: Negative for chest pain, palpitations and leg swelling.   Genitourinary: Negative for difficulty urinating and hematuria.   Endocrine: Negative for cold intolerance and heat intolerance.    Skin: Negative for rash.   Gastrointestinal: Negative for  nausea and abdominal pain.   Neurological: Negative for dizziness, syncope, weakness and light-headedness.   Hematological: Negative for adenopathy. Does not bruise/bleed easily.   Psychiatric/Behavioral: Negative for sleep disturbance. The patient is not nervous/anxious.        Objective:      Physical Exam   Constitutional: She is oriented to person, place, and time. She appears well-developed and well-nourished.   HENT:   Head: Normocephalic and atraumatic.   Mouth/Throat: Oropharyngeal exudate present.   Eyes: Conjunctivae are normal. Pupils are equal, round, and reactive to light.   Neck: Neck supple. No JVD present. No tracheal deviation present. No thyromegaly present.   Cardiovascular: Normal rate, regular rhythm and normal heart sounds.    Pulmonary/Chest: Effort normal. No respiratory distress. She has decreased breath sounds. She has wheezes in the right lower field and the left lower field. She has no rhonchi. She has no rales. She exhibits no tenderness.   Abdominal: Soft. Bowel sounds are normal.   Musculoskeletal: Normal range of motion. She exhibits no edema.   Lymphadenopathy:     She has no cervical adenopathy.   Neurological: She is alert and oriented to person, place, and time.   Skin: Skin is warm and dry.   Nursing note and vitals reviewed.    Personal Diagnostic Review  Chest X-Ray: pending  Pulmonary function tests:  No recent  No flowsheet data found.      Assessment:       1. COPD exacerbation    2. Chronic vasomotor rhinitis    3. Chronic obstructive pulmonary disease, unspecified COPD type        Outpatient Encounter Prescriptions as of 2/20/2018   Medication Sig Dispense Refill    acetaminophen (TYLENOL) 325 MG tablet Take 650 mg by mouth every 6 (six) hours as needed for Pain.      albuterol-ipratropium 2.5mg-0.5mg/3mL (DUO-NEB) 0.5 mg-3 mg(2.5 mg base)/3 mL nebulizer solution Take 3 mLs by nebulization every 6 (six) hours. 120 vial 12    amlodipine (NORVASC) 2.5 MG tablet Take 1  tablet (2.5 mg total) by mouth once daily. 30 tablet 11    aspirin (ECOTRIN) 81 MG EC tablet Take 81 mg by mouth once daily.      atenolol (TENORMIN) 25 MG tablet Take 1 tablet (25 mg total) by mouth every evening. 90 tablet 3    benazepril (LOTENSIN) 20 MG tablet TAKE ONE TABLET BY MOUTH TWICE DAILY 180 tablet 3    calcium carbonate (OS-RICHY) 600 mg (1,500 mg) Tab Take 600 mg by mouth once daily.       clopidogrel (PLAVIX) 75 mg tablet TAKE ONE TABLET BY MOUTH ONCE DAILY 90 tablet 2    gabapentin (NEURONTIN) 300 MG capsule Take 2 in the morning, 1 at noon and 2 at night 150 capsule 3    hydrALAZINE (APRESOLINE) 10 MG tablet Take 1 tablet (10 mg total) by mouth 3 (three) times daily. 90 tablet 11    hydrocodone-acetaminophen 7.5-325mg (NORCO) 7.5-325 mg per tablet Take 1 tablet by mouth every 12 (twelve) hours as needed for Pain. 60 tablet 0    ipratropium (ATROVENT) 42 mcg (0.06 %) nasal spray 2 sprays by Nasal route 4 (four) times daily. As needed for rhinitis 15 mL 11    metformin (GLUCOPHAGE) 500 MG tablet Take 500 mg by mouth 2 (two) times daily.      milk thistle 175 mg tablet Take 175 mg by mouth once daily.      nebulizer accessories Kit Use with nebulizer 1 kit prn    nitroGLYCERIN (NITROSTAT) 0.4 MG SL tablet Place 1 tablet (0.4 mg total) under the tongue every 5 (five) minutes as needed for Chest pain. 60 tablet 12    rosuvastatin (CRESTOR) 20 MG tablet Take 1 tablet (20 mg total) by mouth every evening. 90 tablet 3    sertraline (ZOLOFT) 100 MG tablet TAKE ONE TABLET BY MOUTH ONCE DAILY IN THE EVENING 90 tablet 3    sertraline (ZOLOFT) 50 MG tablet Take 1 tablet (50 mg total) by mouth once daily. 90 tablet 2    sertraline (ZOLOFT) 50 MG tablet TAKE ONE TABLET BY MOUTH ONCE DAILY 90 tablet 1    UNABLE TO FIND 1 tablet once daily. MULTIVIT-iron-FA-calcium-mins tab  9mg-iron-400mcg      [DISCONTINUED] albuterol-ipratropium 2.5mg-0.5mg/3mL (DUO-NEB) 0.5 mg-3 mg(2.5 mg base)/3 mL nebulizer  solution Take 3 mLs by nebulization every 6 (six) hours. 120 vial 12    [DISCONTINUED] ipratropium (ATROVENT) 0.06 % nasal spray 2 sprays by Nasal route 4 (four) times daily. As needed for rhinitis 15 mL 11    [DISCONTINUED] levocetirizine (XYZAL) 5 MG tablet Take 1 tablet (5 mg total) by mouth every evening. 30 tablet 11    azithromycin (ZITHROMAX Z-ROBBIN) 250 MG tablet Take 1 tablet (250 mg total) by mouth once daily. 500 mg on day 1 (two tablets) followed by 250 mg once daily on days 2-5 6 tablet 1    guaiFENesin (MUCINEX) 600 mg 12 hr tablet Take 2 tablets (1,200 mg total) by mouth 2 (two) times daily. 60 tablet prn    isosorbide mononitrate (IMDUR) 60 MG 24 hr tablet Take 1 tablet (60 mg total) by mouth 2 (two) times daily. 180 tablet 3    predniSONE (DELTASONE) 20 MG tablet Prednisone 60 mg/ day for 3 days, 40 mg/day for 3 days,20 mg/ day for 3 days, (1/2 tablet )10 mg a day for 3 days. 20 tablet 1    promethazine-codeine 6.25-10 mg/5 ml (PHENERGAN WITH CODEINE) 6.25-10 mg/5 mL syrup Take 5 mLs by mouth every 4 (four) hours as needed for Cough. 240 mL 0     No facility-administered encounter medications on file as of 2018.      Orders Placed This Encounter   Procedures    X-Ray Chest PA And Lateral     Standing Status:   Future     Standing Expiration Date:   2019     Order Specific Question:   May the Radiologist modify the order per protocol to meet the clinical needs of the patient?     Answer:   Yes     Plan:       Requested Prescriptions     Signed Prescriptions Disp Refills    ipratropium (ATROVENT) 42 mcg (0.06 %) nasal spray 15 mL 11     Si sprays by Nasal route 4 (four) times daily. As needed for rhinitis    predniSONE (DELTASONE) 20 MG tablet 20 tablet 1     Sig: Prednisone 60 mg/ day for 3 days, 40 mg/day for 3 days,20 mg/ day for 3 days, (1/2 tablet )10 mg a day for 3 days.    azithromycin (ZITHROMAX Z-ROBBIN) 250 MG tablet 6 tablet 1     Sig: Take 1 tablet (250 mg total) by  mouth once daily. 500 mg on day 1 (two tablets) followed by 250 mg once daily on days 2-5    albuterol-ipratropium 2.5mg-0.5mg/3mL (DUO-NEB) 0.5 mg-3 mg(2.5 mg base)/3 mL nebulizer solution 120 vial 12     Sig: Take 3 mLs by nebulization every 6 (six) hours.    guaiFENesin (MUCINEX) 600 mg 12 hr tablet 60 tablet prn     Sig: Take 2 tablets (1,200 mg total) by mouth 2 (two) times daily.    promethazine-codeine 6.25-10 mg/5 ml (PHENERGAN WITH CODEINE) 6.25-10 mg/5 mL syrup 240 mL 0     Sig: Take 5 mLs by mouth every 4 (four) hours as needed for Cough.     COPD exacerbation  -     predniSONE (DELTASONE) 20 MG tablet; Prednisone 60 mg/ day for 3 days, 40 mg/day for 3 days,20 mg/ day for 3 days, (1/2 tablet )10 mg a day for 3 days.  Dispense: 20 tablet; Refill: 1  -     azithromycin (ZITHROMAX Z-ROBBIN) 250 MG tablet; Take 1 tablet (250 mg total) by mouth once daily. 500 mg on day 1 (two tablets) followed by 250 mg once daily on days 2-5  Dispense: 6 tablet; Refill: 1  -     X-Ray Chest PA And Lateral; Future; Expected date: 02/20/2018  -     guaiFENesin (MUCINEX) 600 mg 12 hr tablet; Take 2 tablets (1,200 mg total) by mouth 2 (two) times daily.  Dispense: 60 tablet; Refill: prn  -     promethazine-codeine 6.25-10 mg/5 ml (PHENERGAN WITH CODEINE) 6.25-10 mg/5 mL syrup; Take 5 mLs by mouth every 4 (four) hours as needed for Cough.  Dispense: 240 mL; Refill: 0    Chronic vasomotor rhinitis  -     ipratropium (ATROVENT) 42 mcg (0.06 %) nasal spray; 2 sprays by Nasal route 4 (four) times daily. As needed for rhinitis  Dispense: 15 mL; Refill: 11    Chronic obstructive pulmonary disease, unspecified COPD type  -     X-Ray Chest PA And Lateral; Future; Expected date: 02/20/2018  -     albuterol-ipratropium 2.5mg-0.5mg/3mL (DUO-NEB) 0.5 mg-3 mg(2.5 mg base)/3 mL nebulizer solution; Take 3 mLs by nebulization every 6 (six) hours.  Dispense: 120 vial; Refill: 12      Follow-up in about 6 months (around 8/20/2018) for  percy.    MEDICAL DECISION MAKING: Moderate  complexity.  Overall, the multiple problems listed are of moderate severity that may impact quality of life and activities of daily living. Side effects of medications, treatment plan as well as options and alternatives reviewed and discussed with patient. There was counseling of patient concerning these issues.    Total time spent in face to face counseling and coordination of care - 25 minutes over 50% of time was used in discussion of prognosis, risks, benefits of treatment, instructions and compliance with regimen . Discussion with other physicians and/or health care providers ( home health or for use of durable medical equipment (oxygen, nebulizers, CPAP, BiPAP) occurred.

## 2018-03-05 NOTE — TELEPHONE ENCOUNTER
----- Message from Ivette Matthias sent at 3/5/2018  1:22 PM CST -----  Contact:  Pt  Pt calling in regards to she is requesting for a refill of her medication (sertraline 50mg) to be called into her pharmacy. Requesting for 90 day supply    Pt pharmacy .    [address] [address]  Phone: [phone] Fax: [phone]    Pt can be reached at [phone]

## 2018-03-06 DIAGNOSIS — I25.10 ATHEROSCLEROSIS OF NATIVE CORONARY ARTERY WITHOUT ANGINA PECTORIS, UNSPECIFIED WHETHER NATIVE OR TRANSPLANTED HEART: ICD-10-CM

## 2018-03-06 RX ORDER — SERTRALINE HYDROCHLORIDE 50 MG/1
50 TABLET, FILM COATED ORAL DAILY
Qty: 90 TABLET | Refills: 2 | Status: SHIPPED | OUTPATIENT
Start: 2018-03-06 | End: 2018-08-02 | Stop reason: SDUPTHER

## 2018-03-07 RX ORDER — CLOPIDOGREL BISULFATE 75 MG/1
TABLET ORAL
Qty: 90 TABLET | Refills: 1 | Status: SHIPPED | OUTPATIENT
Start: 2018-03-07 | End: 2018-11-27 | Stop reason: SDUPTHER

## 2018-03-14 DIAGNOSIS — I20.9 AP (ANGINA PECTORIS): ICD-10-CM

## 2018-03-14 RX ORDER — ISOSORBIDE MONONITRATE 60 MG/1
60 TABLET, EXTENDED RELEASE ORAL 2 TIMES DAILY
Qty: 180 TABLET | Refills: 3 | Status: SHIPPED | OUTPATIENT
Start: 2018-03-14 | End: 2018-08-02 | Stop reason: CLARIF

## 2018-03-14 NOTE — TELEPHONE ENCOUNTER
----- Message from Justine Lord sent at 3/14/2018  2:47 PM CDT -----  Contact: Patient  Patient is checking on status of a refill on isosorbide mononitrate-[facility] [phone], patient is out of this medication, please call patient back at [phone]. Thank you

## 2018-06-07 RX ORDER — METFORMIN HYDROCHLORIDE 500 MG/1
TABLET ORAL
Qty: 180 TABLET | Refills: 2 | Status: SHIPPED | OUTPATIENT
Start: 2018-06-07 | End: 2018-08-23

## 2018-06-08 RX ORDER — METFORMIN HYDROCHLORIDE 500 MG/1
500 TABLET ORAL 2 TIMES DAILY WITH MEALS
Qty: 180 TABLET | Refills: 2 | Status: SHIPPED | OUTPATIENT
Start: 2018-06-08 | End: 2018-08-23

## 2018-07-18 ENCOUNTER — LAB VISIT (OUTPATIENT)
Dept: LAB | Facility: HOSPITAL | Age: 83
End: 2018-07-18
Attending: INTERNAL MEDICINE
Payer: MEDICARE

## 2018-07-18 ENCOUNTER — CLINICAL SUPPORT (OUTPATIENT)
Dept: CARDIOLOGY | Facility: CLINIC | Age: 83
End: 2018-07-18
Attending: INTERNAL MEDICINE
Payer: MEDICARE

## 2018-07-18 DIAGNOSIS — E78.2 MIXED HYPERLIPIDEMIA: ICD-10-CM

## 2018-07-18 DIAGNOSIS — I77.9 CAROTID ARTERY DISEASE WITHOUT CEREBRAL INFARCTION: ICD-10-CM

## 2018-07-18 DIAGNOSIS — I65.23 ASYMPTOMATIC STENOSIS OF BOTH CAROTID ARTERIES WITHOUT INFARCTION: ICD-10-CM

## 2018-07-18 LAB
ALBUMIN SERPL BCP-MCNC: 3.7 G/DL
ALP SERPL-CCNC: 58 U/L
ALT SERPL W/O P-5'-P-CCNC: 16 U/L
ANION GAP SERPL CALC-SCNC: 8 MMOL/L
AST SERPL-CCNC: 19 U/L
BILIRUB SERPL-MCNC: 0.7 MG/DL
BUN SERPL-MCNC: 20 MG/DL
CALCIUM SERPL-MCNC: 9.6 MG/DL
CHLORIDE SERPL-SCNC: 104 MMOL/L
CHOLEST SERPL-MCNC: 134 MG/DL
CHOLEST/HDLC SERPL: 2.2 {RATIO}
CO2 SERPL-SCNC: 30 MMOL/L
CREAT SERPL-MCNC: 0.8 MG/DL
EST. GFR  (AFRICAN AMERICAN): >60 ML/MIN/1.73 M^2
EST. GFR  (NON AFRICAN AMERICAN): >60 ML/MIN/1.73 M^2
GLUCOSE SERPL-MCNC: 98 MG/DL
HDLC SERPL-MCNC: 61 MG/DL
HDLC SERPL: 45.5 %
INTERNAL CAROTID STENOSIS: ABNORMAL
LDLC SERPL CALC-MCNC: 51.8 MG/DL
NONHDLC SERPL-MCNC: 73 MG/DL
POTASSIUM SERPL-SCNC: 4.3 MMOL/L
PROT SERPL-MCNC: 6.9 G/DL
SODIUM SERPL-SCNC: 142 MMOL/L
TRIGL SERPL-MCNC: 106 MG/DL

## 2018-07-18 PROCEDURE — 80053 COMPREHEN METABOLIC PANEL: CPT

## 2018-07-18 PROCEDURE — 36415 COLL VENOUS BLD VENIPUNCTURE: CPT | Mod: PO

## 2018-07-18 PROCEDURE — 93880 EXTRACRANIAL BILAT STUDY: CPT | Mod: S$GLB,,, | Performed by: INTERNAL MEDICINE

## 2018-07-18 PROCEDURE — 80061 LIPID PANEL: CPT

## 2018-08-02 ENCOUNTER — OFFICE VISIT (OUTPATIENT)
Dept: CARDIOLOGY | Facility: CLINIC | Age: 83
End: 2018-08-02
Payer: MEDICARE

## 2018-08-02 VITALS
HEIGHT: 64 IN | BODY MASS INDEX: 39.11 KG/M2 | HEART RATE: 72 BPM | WEIGHT: 229.06 LBS | DIASTOLIC BLOOD PRESSURE: 90 MMHG | SYSTOLIC BLOOD PRESSURE: 148 MMHG

## 2018-08-02 DIAGNOSIS — Z98.61 HISTORY OF PTCA: ICD-10-CM

## 2018-08-02 DIAGNOSIS — I65.23 ASYMPTOMATIC STENOSIS OF BOTH CAROTID ARTERIES WITHOUT INFARCTION: ICD-10-CM

## 2018-08-02 DIAGNOSIS — I25.10 ATHEROSCLEROSIS OF NATIVE CORONARY ARTERY OF NATIVE HEART WITHOUT ANGINA PECTORIS: ICD-10-CM

## 2018-08-02 DIAGNOSIS — G89.29 CHRONIC NONINTRACTABLE HEADACHE, UNSPECIFIED HEADACHE TYPE: ICD-10-CM

## 2018-08-02 DIAGNOSIS — I77.9 CAROTID ARTERY DISEASE WITHOUT CEREBRAL INFARCTION: ICD-10-CM

## 2018-08-02 DIAGNOSIS — I10 ESSENTIAL HYPERTENSION: ICD-10-CM

## 2018-08-02 DIAGNOSIS — R51.9 CHRONIC NONINTRACTABLE HEADACHE, UNSPECIFIED HEADACHE TYPE: ICD-10-CM

## 2018-08-02 DIAGNOSIS — E78.2 MIXED HYPERLIPIDEMIA: ICD-10-CM

## 2018-08-02 DIAGNOSIS — I51.89 DIASTOLIC DYSFUNCTION: ICD-10-CM

## 2018-08-02 DIAGNOSIS — I25.10 CAD IN NATIVE ARTERY: Primary | ICD-10-CM

## 2018-08-02 DIAGNOSIS — R26.81 UNSTEADINESS ON FEET: ICD-10-CM

## 2018-08-02 DIAGNOSIS — E11.9 CONTROLLED TYPE 2 DIABETES MELLITUS WITHOUT COMPLICATION, WITHOUT LONG-TERM CURRENT USE OF INSULIN: ICD-10-CM

## 2018-08-02 PROCEDURE — 99214 OFFICE O/P EST MOD 30 MIN: CPT | Mod: S$GLB,,, | Performed by: INTERNAL MEDICINE

## 2018-08-02 PROCEDURE — 3080F DIAST BP >= 90 MM HG: CPT | Mod: CPTII,S$GLB,, | Performed by: INTERNAL MEDICINE

## 2018-08-02 PROCEDURE — 99999 PR PBB SHADOW E&M-EST. PATIENT-LVL III: CPT | Mod: PBBFAC,,, | Performed by: INTERNAL MEDICINE

## 2018-08-02 PROCEDURE — 3077F SYST BP >= 140 MM HG: CPT | Mod: CPTII,S$GLB,, | Performed by: INTERNAL MEDICINE

## 2018-08-02 RX ORDER — ISOSORBIDE MONONITRATE 60 MG/1
60 TABLET, EXTENDED RELEASE ORAL DAILY
COMMUNITY
End: 2019-09-18

## 2018-08-02 NOTE — PROGRESS NOTES
Subjective:    Patient ID:  Maldonado Deleon is a 83 y.o. female who presents for evaluation of Shortness of Breath; Hypertension; Hyperlipidemia; and Coronary Artery Disease      HPI Mrs. Deleon returns for f/u.   Her current medical conditions include COPD, R subclavian stenosis, carotid artery disease, hypertension, hyperlipidemia, diabetes (former smoker) and CAD. Nonsmoker.  On home O2 qhs.   s/p C 6/15 for anginal sxs and had PCI of calcified 90% mid-RCA stenosis with Diamondback atherectomy and LINDSAY x one. Her mid-LAD stenosis was overal stable in 50 - 60% range and medical mgt advised.  She is here for f/u.  She has falls.  She loses balance very easily, has to hold on to things. This is daily, chronic but worsening.  She has neuropathy in feet, contributes to her being off balance and falls.  No syncope.    Chronic dizziness.   Has put on weight per pt.  Has headaches, chronic.    She has chronic COTTER, unchanged but bothersome.  On oxygen.  No pnd/orthopnea.  Chronic leg edema.  Chronic angina is stable, not bothersome right now.  Uses ntg on occasion.  Lipids are well controlled on statin tx.  Carotid u/s 7/18 is stable, < 50% stenosis, and R VA retrograde flow as noted in past.      Past Medical History:   Diagnosis Date    Anticoagulant long-term use     Plavix    Atypical chest pain 8/26/2013    Back pain     CAD (coronary artery disease)     CAD in native artery 10/5/2015    Carotid artery disease without cerebral infarction 8/26/2013    Colon cancer     resection and chemo.    COPD (chronic obstructive pulmonary disease)     Depression     Diabetes mellitus type II     Diaphragmatic hernia without obstruction and without gangrene 8/13/2015    Emphysema of lung     Encounter for blood transfusion     Gallstone pancreatitis     History of PTCA 10/5/2015    Hypertension     Lymphocytic colitis     Meningioma     OA (osteoarthritis)     Obesity 8/26/2013    Oxygen dependent     q hs  and prn       Current Outpatient Prescriptions:     acetaminophen (TYLENOL) 325 MG tablet, Take 650 mg by mouth every 6 (six) hours as needed for Pain., Disp: , Rfl:     albuterol-ipratropium 2.5mg-0.5mg/3mL (DUO-NEB) 0.5 mg-3 mg(2.5 mg base)/3 mL nebulizer solution, Take 3 mLs by nebulization every 6 (six) hours., Disp: 120 vial, Rfl: 12    amlodipine (NORVASC) 2.5 MG tablet, Take 1 tablet (2.5 mg total) by mouth once daily., Disp: 30 tablet, Rfl: 11    aspirin (ECOTRIN) 81 MG EC tablet, Take 81 mg by mouth once daily., Disp: , Rfl:     atenolol (TENORMIN) 25 MG tablet, Take 1 tablet (25 mg total) by mouth every evening., Disp: 90 tablet, Rfl: 3    azithromycin (ZITHROMAX Z-ROBBIN) 250 MG tablet, Take 1 tablet (250 mg total) by mouth once daily. 500 mg on day 1 (two tablets) followed by 250 mg once daily on days 2-5, Disp: 6 tablet, Rfl: 1    benazepril (LOTENSIN) 20 MG tablet, TAKE ONE TABLET BY MOUTH TWICE DAILY, Disp: 180 tablet, Rfl: 3    calcium carbonate (OS-RICHY) 600 mg (1,500 mg) Tab, Take 600 mg by mouth once daily. , Disp: , Rfl:     clopidogrel (PLAVIX) 75 mg tablet, TAKE ONE TABLET BY MOUTH ONCE DAILY, Disp: 90 tablet, Rfl: 1    gabapentin (NEURONTIN) 300 MG capsule, Take 2 in the morning, 1 at noon and 2 at night, Disp: 150 capsule, Rfl: 3    hydrALAZINE (APRESOLINE) 10 MG tablet, Take 1 tablet (10 mg total) by mouth 3 (three) times daily., Disp: 90 tablet, Rfl: 11    ipratropium (ATROVENT) 42 mcg (0.06 %) nasal spray, 2 sprays by Nasal route 4 (four) times daily. As needed for rhinitis, Disp: 15 mL, Rfl: 11    isosorbide mononitrate (IMDUR) 60 MG 24 hr tablet, Take 60 mg by mouth once daily., Disp: , Rfl:     metFORMIN (GLUCOPHAGE) 500 MG tablet, TAKE ONE TABLET BY MOUTH TWICE DAILY WITH MEALS, Disp: 180 tablet, Rfl: 2    metFORMIN (GLUCOPHAGE) 500 MG tablet, Take 1 tablet (500 mg total) by mouth 2 (two) times daily with meals., Disp: 180 tablet, Rfl: 2    rosuvastatin (CRESTOR) 20 MG  "tablet, Take 1 tablet (20 mg total) by mouth every evening., Disp: 90 tablet, Rfl: 3    sertraline (ZOLOFT) 100 MG tablet, TAKE ONE TABLET BY MOUTH ONCE DAILY IN THE EVENING, Disp: 90 tablet, Rfl: 3    sertraline (ZOLOFT) 50 MG tablet, TAKE ONE TABLET BY MOUTH ONCE DAILY, Disp: 90 tablet, Rfl: 1    UNABLE TO FIND, 1 tablet once daily. MULTIVIT-iron-FA-calcium-mins tab 9mg-iron-400mcg, Disp: , Rfl:     hydrocodone-acetaminophen 7.5-325mg (NORCO) 7.5-325 mg per tablet, Take 1 tablet by mouth every 12 (twelve) hours as needed for Pain., Disp: 60 tablet, Rfl: 0    milk thistle 175 mg tablet, Take 175 mg by mouth once daily., Disp: , Rfl:     nebulizer accessories Kit, Use with nebulizer, Disp: 1 kit, Rfl: prn    nitroGLYCERIN (NITROSTAT) 0.4 MG SL tablet, Place 1 tablet (0.4 mg total) under the tongue every 5 (five) minutes as needed for Chest pain., Disp: 60 tablet, Rfl: 12    predniSONE (DELTASONE) 20 MG tablet, Prednisone 60 mg/ day for 3 days, 40 mg/day for 3 days,20 mg/ day for 3 days, (1/2 tablet )10 mg a day for 3 days., Disp: 20 tablet, Rfl: 1        Review of Systems   Constitution: Positive for weight gain.   HENT: Negative.    Eyes: Negative.    Cardiovascular: Positive for dyspnea on exertion and leg swelling.   Respiratory: Positive for shortness of breath.    Endocrine: Negative.    Hematologic/Lymphatic: Negative.    Skin: Negative.    Musculoskeletal: Positive for arthritis, back pain and joint pain.   Gastrointestinal: Negative.    Genitourinary: Negative.    Neurological: Positive for dizziness, headaches, loss of balance and numbness.   Psychiatric/Behavioral: Negative.    Allergic/Immunologic: Negative.        BP (!) 148/90 (BP Location: Left arm, Patient Position: Sitting, BP Method: Large (Manual))   Pulse 72   Ht 5' 4" (1.626 m)   Wt 103.9 kg (229 lb 0.9 oz)   BMI 39.32 kg/m²     Wt Readings from Last 3 Encounters:   08/02/18 103.9 kg (229 lb 0.9 oz)   02/20/18 101.9 kg (224 lb 10.4 " oz)   01/29/18 102.9 kg (226 lb 13.7 oz)     Temp Readings from Last 3 Encounters:   01/15/18 96.6 °F (35.9 °C) (Tympanic)   09/08/17 96.8 °F (36 °C) (Tympanic)   08/02/17 97.5 °F (36.4 °C) (Tympanic)     BP Readings from Last 3 Encounters:   08/02/18 (!) 148/90   02/20/18 (!) 158/64   01/29/18 (!) 152/84     Pulse Readings from Last 3 Encounters:   08/02/18 72   02/20/18 72   01/29/18 64          Objective:    Physical Exam   Constitutional: She is oriented to person, place, and time. Vital signs are normal. She appears well-developed and well-nourished. She is active and cooperative. She does not have a sickly appearance. She does not appear ill. No distress.   HENT:   Head: Normocephalic.   Neck: Neck supple. Normal carotid pulses, no hepatojugular reflux and no JVD present. Carotid bruit is not present. No thyromegaly present.   Cardiovascular: Normal rate, regular rhythm, S1 normal, S2 normal, normal heart sounds and normal pulses.  PMI is not displaced.  Exam reveals no gallop and no friction rub.    No murmur heard.  Pulses:       Radial pulses are 2+ on the right side, and 2+ on the left side.   Pulmonary/Chest: Effort normal and breath sounds normal. She has no wheezes. She has no rales.   Abdominal: Soft. Normal appearance, normal aorta and bowel sounds are normal. She exhibits no mass. There is no tenderness.   obese   Musculoskeletal: She exhibits no edema.   Lymphadenopathy:     She has no cervical adenopathy.   Neurological: She is alert and oriented to person, place, and time.   Skin: Skin is warm. She is not diaphoretic.   Psychiatric: She has a normal mood and affect. Her behavior is normal.   Nursing note and vitals reviewed.      I have reviewed all pertinent labs and cardiac studies.    Lab Results   Component Value Date    HGBA1C 5.6 01/16/2018           Chemistry        Component Value Date/Time     07/18/2018 0816    K 4.3 07/18/2018 0816     07/18/2018 0816    CO2 30 (H)  07/18/2018 0816    BUN 20 07/18/2018 0816    CREATININE 0.8 07/18/2018 0816    GLU 98 07/18/2018 0816        Component Value Date/Time    CALCIUM 9.6 07/18/2018 0816    ALKPHOS 58 07/18/2018 0816    AST 19 07/18/2018 0816    ALT 16 07/18/2018 0816    BILITOT 0.7 07/18/2018 0816    ESTGFRAFRICA >60.0 07/18/2018 0816    EGFRNONAA >60.0 07/18/2018 0816        Lab Results   Component Value Date    WBC 8.30 07/30/2017    HGB 12.1 07/30/2017    HCT 37.5 07/30/2017    MCV 85 07/30/2017     07/30/2017     Lab Results   Component Value Date    HGBA1C 5.6 01/16/2018     Lab Results   Component Value Date    CHOL 134 07/18/2018    CHOL 192 01/16/2018    CHOL 168 07/19/2016     Lab Results   Component Value Date    HDL 61 07/18/2018    HDL 61 01/16/2018    HDL 54 07/19/2016     Lab Results   Component Value Date    LDLCALC 51.8 (L) 07/18/2018    LDLCALC 108.0 01/16/2018    LDLCALC 92.0 07/19/2016     Lab Results   Component Value Date    TRIG 106 07/18/2018    TRIG 115 01/16/2018    TRIG 110 07/19/2016     Lab Results   Component Value Date    CHOLHDL 45.5 07/18/2018    CHOLHDL 31.8 01/16/2018    CHOLHDL 32.1 07/19/2016     Narrative     Date of Procedure: 07/18/2018    PRE-TEST DATA   RIGHT             (PSV/EDV)  ICA                 97/21  CCA                 71/8  ECA                 69/6  VERTEBRAL           44/5  BULB                87/11    LEFT             (PSV/EDV)  ICA                 161/34  CCA                 84/16  ECA                 117/11  VERTEBRAL           56/8  BULB                82/14    The right BP is 148/78  The left BP is 162/72      TEST DESCRIPTION     RIGHT  The right Proximal Common Carotid Artery is visualized.   The right carotid bulb artery is visualized.   The right Distal Internal Carotid Artery has 20 - 39% stenosis.   The right external carotid artery is visualized.   The right vertebral artery is visualized, associated with anterograde flow and retrograde flow.   The right ICA/CCA  ratio is: 1.37    LEFT  The left Proximal Common Carotid Artery is visualized.   The left carotid bulb artery is visualized.   The left Mid Internal Carotid Artery has 40 - 49% stenosis, associated with heterogeneous plaque.   There is acceleration in the left external carotid artery.   The left vertebral artery is visualized, associated with anterograde flow.   The left ICA/CCA ratio is: 1.92      CONCLUSIONS   There is 20 - 39% right Internal Carotid stenosis.  There is 40 - 49% left Internal Carotid stenosis.    Systolic flow reveral noted in rt VA        This document has been electronically    SIGNED BY: Theresa Reyes MD On: 07/18/2018 13:17           Assessment:       1. CAD in native artery    2. History of PTCA    3. Essential hypertension    4. Carotid artery disease without cerebral infarction    5. Mixed hyperlipidemia    6. Atherosclerosis of native coronary artery of native heart without angina pectoris    7. Controlled type 2 diabetes mellitus without complication, without long-term current use of insulin    8. Diastolic dysfunction    9. Asymptomatic stenosis of both carotid arteries without infarction    10. Unsteadiness on feet    11. Chronic nonintractable headache, unspecified headache type         Plan:             Neurology consult advised for chronic headaches and worsening gait instability issues.  Don't think headaches are from Imdur which she has been on for long time.    Continue medical tx for CAD.  Subclavian stenosis, chronic, and good radial pulse and don't think her sxs are from her subclavian disease but more from age with gait instability issues.  Test results reviewed with pt.    Continue current meds.  BP elevated/variable but she probably will have more dizziness/side effects with adding more meds.  Cardiac diet  Keep DM well controlled.  Fall precautions discussed, continue walker.  Will stay on asa, plavix with her multivessel CAD/vascular disease,  however if she has any  bleeding issues with plavix in future, etc can stop plavix.   F/u with Pulmonary for COPD related dyspnea/oxygen.    f/u 6 months.

## 2018-08-13 DIAGNOSIS — I10 ESSENTIAL HYPERTENSION: ICD-10-CM

## 2018-08-14 RX ORDER — ATENOLOL 25 MG/1
TABLET ORAL
Qty: 90 TABLET | Refills: 3 | Status: SHIPPED | OUTPATIENT
Start: 2018-08-14 | End: 2019-01-02 | Stop reason: SDUPTHER

## 2018-08-20 ENCOUNTER — TELEPHONE (OUTPATIENT)
Dept: INTERNAL MEDICINE | Facility: CLINIC | Age: 83
End: 2018-08-20

## 2018-08-20 ENCOUNTER — OFFICE VISIT (OUTPATIENT)
Dept: INTERNAL MEDICINE | Facility: CLINIC | Age: 83
End: 2018-08-20
Payer: MEDICARE

## 2018-08-20 ENCOUNTER — LAB VISIT (OUTPATIENT)
Dept: LAB | Facility: HOSPITAL | Age: 83
End: 2018-08-20
Attending: FAMILY MEDICINE
Payer: MEDICARE

## 2018-08-20 DIAGNOSIS — M16.12 ARTHRITIS OF LEFT HIP: ICD-10-CM

## 2018-08-20 DIAGNOSIS — E11.9 CONTROLLED TYPE 2 DIABETES MELLITUS WITHOUT COMPLICATION, WITHOUT LONG-TERM CURRENT USE OF INSULIN: Primary | ICD-10-CM
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
## 2018-08-20 DIAGNOSIS — G62.9 NEUROPATHY: ICD-10-CM

## 2018-08-20 DIAGNOSIS — I25.10 CAD IN NATIVE ARTERY: ICD-10-CM

## 2018-08-20 DIAGNOSIS — E11.9 CONTROLLED TYPE 2 DIABETES MELLITUS WITHOUT COMPLICATION, WITHOUT LONG-TERM CURRENT USE OF INSULIN: ICD-10-CM

## 2018-08-20 PROBLEM — R51.9 PERSISTENT HEADACHES: Status: RESOLVED | Noted: 2018-01-29 | Resolved: 2018-08-20

## 2018-08-20 LAB
BASOPHILS # BLD AUTO: 0.08 K/UL
BASOPHILS NFR BLD: 0.9 %
DIFFERENTIAL METHOD: ABNORMAL
EOSINOPHIL # BLD AUTO: 0.1 K/UL
EOSINOPHIL NFR BLD: 1.2 %
ERYTHROCYTE [DISTWIDTH] IN BLOOD BY AUTOMATED COUNT: 14.4 %
ESTIMATED AVG GLUCOSE: 120 MG/DL
HBA1C MFR BLD HPLC: 5.8 %
HCT VFR BLD AUTO: 41.1 %
HGB BLD-MCNC: 12.7 G/DL
IMM GRANULOCYTES # BLD AUTO: 0.06 K/UL
IMM GRANULOCYTES NFR BLD AUTO: 0.7 %
LYMPHOCYTES # BLD AUTO: 1.4 K/UL
LYMPHOCYTES NFR BLD: 15 %
MCH RBC QN AUTO: 28.6 PG
MCHC RBC AUTO-ENTMCNC: 30.9 G/DL
MCV RBC AUTO: 93 FL
MONOCYTES # BLD AUTO: 0.6 K/UL
MONOCYTES NFR BLD: 6.9 %
NEUTROPHILS # BLD AUTO: 6.9 K/UL
NEUTROPHILS NFR BLD: 75.3 %
NRBC BLD-RTO: 0 /100 WBC
PLATELET # BLD AUTO: 168 K/UL
PMV BLD AUTO: 11.6 FL
RBC # BLD AUTO: 4.44 M/UL
WBC # BLD AUTO: 9.16 K/UL

## 2018-08-20 PROCEDURE — 3079F DIAST BP 80-89 MM HG: CPT | Mod: CPTII,S$GLB,, | Performed by: FAMILY MEDICINE

## 2018-08-20 PROCEDURE — 85025 COMPLETE CBC W/AUTO DIFF WBC: CPT

## 2018-08-20 PROCEDURE — 99214 OFFICE O/P EST MOD 30 MIN: CPT | Mod: S$GLB,,, | Performed by: FAMILY MEDICINE

## 2018-08-20 PROCEDURE — 83036 HEMOGLOBIN GLYCOSYLATED A1C: CPT

## 2018-08-20 PROCEDURE — 3075F SYST BP GE 130 - 139MM HG: CPT | Mod: CPTII,S$GLB,, | Performed by: FAMILY MEDICINE

## 2018-08-20 PROCEDURE — 36415 COLL VENOUS BLD VENIPUNCTURE: CPT | Mod: PO

## 2018-08-20 PROCEDURE — 99999 PR PBB SHADOW E&M-EST. PATIENT-LVL III: CPT | Mod: PBBFAC,,, | Performed by: FAMILY MEDICINE

## 2018-08-20 RX ORDER — HYDROCODONE BITARTRATE AND ACETAMINOPHEN 10; 325 MG/1; MG/1
1 TABLET ORAL EVERY 12 HOURS PRN
Qty: 60 TABLET | Refills: 0 | Status: SHIPPED | OUTPATIENT
Start: 2018-08-20 | End: 2019-06-28 | Stop reason: SDUPTHER

## 2018-08-20 NOTE — TELEPHONE ENCOUNTER
[facility] only fills hydrocodone first time for 7 days.  Patient will need a new prescription after.

## 2018-08-20 NOTE — PROGRESS NOTES
Subjective:       Patient ID: Maldonado Deleon is a 83 y.o. female.    Chief Complaint: Fall    Fall:         Pt is an 83 year old presenting today after multiples falls. Pt has bad neuropathy. She is due to see Neurology due to the falls to be sure no neurological etiology.  Discussed with pt various options regarding neuropathy such as PT and use of gabapentin. Pt BP is stable. She does see Cardiology. Her anxiety is controlled. She does occasionally take Norco for pain.      Review of Systems   Constitutional: Negative.    Respiratory: Negative.    Cardiovascular: Negative.    Gastrointestinal: Negative.    Neurological: Positive for numbness.   Psychiatric/Behavioral: Negative.        Objective:      Physical Exam   Constitutional: She is oriented to person, place, and time. She appears well-developed and well-nourished.   Neck: Normal range of motion. Neck supple.   Cardiovascular: Normal rate and regular rhythm.   Pulmonary/Chest: Effort normal and breath sounds normal.   Neurological: She is alert and oriented to person, place, and time.   Skin: Skin is warm and dry.       Assessment:       1. Controlled type 2 diabetes mellitus without complication, without long-term current use of insulin    2. Neuropathy    3. Arthritis of left hip    4. CAD in native artery    5. Fall, initial encounter        Plan:       Controlled type 2 diabetes mellitus without complication, without long-term current use of insulin  Comments:  Pt is well controlled   Orders:  -     CBC auto differential; Future; Expected date: 08/20/2018  -     Hemoglobin A1c; Future; Expected date: 08/20/2018    Neuropathy  Comments:  Will continue with Gabapentin    Arthritis of left hip  Comments:  Pt would be appropriate to get an evaluation for motor scooter  Orders:  -     Ambulatory Referral to Physical/Occupational Therapy    CAD in native artery  Comments:  Continue to see Cardiology  Orders:  -     Ambulatory Referral to Physical/Occupational  Therapy    Fall, initial encounter  -     Ambulatory Referral to Physical/Occupational Therapy    Other orders  -     HYDROcodone-acetaminophen (NORCO)  mg per tablet; Take 1 tablet by mouth every 12 (twelve) hours as needed for Pain.  Dispense: 60 tablet; Refill: 0

## 2018-08-20 NOTE — TELEPHONE ENCOUNTER
----- Message from Clifton Holliday sent at 8/20/2018 12:22 PM CDT -----  Contact: [facility]   Calling about rx that was sent, unable to refill whole presciption due to quantity because of it being the  first fill.      [phone]

## 2018-08-21 ENCOUNTER — TELEPHONE (OUTPATIENT)
Dept: INTERNAL MEDICINE | Facility: CLINIC | Age: 83
End: 2018-08-21

## 2018-08-21 NOTE — TELEPHONE ENCOUNTER
----- Message from Olga Lidia Daniel sent at 8/21/2018  2:56 PM CDT -----  Contact: patient  Calling concerning the status of pain medicine requested. Please call patient @ [phone] Thanks. melissa

## 2018-08-22 VITALS
HEIGHT: 64 IN | WEIGHT: 229.94 LBS | TEMPERATURE: 97 F | SYSTOLIC BLOOD PRESSURE: 130 MMHG | BODY MASS INDEX: 39.26 KG/M2 | HEART RATE: 64 BPM | DIASTOLIC BLOOD PRESSURE: 89 MMHG

## 2018-08-23 ENCOUNTER — OFFICE VISIT (OUTPATIENT)
Dept: PULMONOLOGY | Facility: CLINIC | Age: 83
End: 2018-08-23
Payer: MEDICARE

## 2018-08-23 ENCOUNTER — PROCEDURE VISIT (OUTPATIENT)
Dept: PULMONOLOGY | Facility: CLINIC | Age: 83
End: 2018-08-23
Payer: MEDICARE

## 2018-08-23 VITALS
OXYGEN SATURATION: 93 % | WEIGHT: 232.13 LBS | HEIGHT: 64 IN | HEART RATE: 54 BPM | DIASTOLIC BLOOD PRESSURE: 80 MMHG | BODY MASS INDEX: 39.63 KG/M2 | RESPIRATION RATE: 20 BRPM | SYSTOLIC BLOOD PRESSURE: 134 MMHG

## 2018-08-23 DIAGNOSIS — R93.89 ABNORMAL CXR (CHEST X-RAY): ICD-10-CM

## 2018-08-23 DIAGNOSIS — K44.9 DIAPHRAGMATIC HERNIA WITHOUT OBSTRUCTION AND WITHOUT GANGRENE: Chronic | ICD-10-CM

## 2018-08-23 DIAGNOSIS — I10 ESSENTIAL HYPERTENSION: ICD-10-CM

## 2018-08-23 DIAGNOSIS — R06.02 SOB (SHORTNESS OF BREATH): ICD-10-CM

## 2018-08-23 DIAGNOSIS — R91.1 LUNG NODULE: Primary | ICD-10-CM

## 2018-08-23 DIAGNOSIS — R06.02 SOB (SHORTNESS OF BREATH): Primary | ICD-10-CM

## 2018-08-23 DIAGNOSIS — J44.9 CHRONIC OBSTRUCTIVE PULMONARY DISEASE, UNSPECIFIED COPD TYPE: ICD-10-CM

## 2018-08-23 DIAGNOSIS — R09.81 SINUS CONGESTION: ICD-10-CM

## 2018-08-23 DIAGNOSIS — J44.1 COPD EXACERBATION: ICD-10-CM

## 2018-08-23 PROCEDURE — 99214 OFFICE O/P EST MOD 30 MIN: CPT | Mod: 25,S$GLB,, | Performed by: INTERNAL MEDICINE

## 2018-08-23 PROCEDURE — 3075F SYST BP GE 130 - 139MM HG: CPT | Mod: CPTII,S$GLB,, | Performed by: INTERNAL MEDICINE

## 2018-08-23 PROCEDURE — 94060 EVALUATION OF WHEEZING: CPT | Mod: S$GLB,,, | Performed by: INTERNAL MEDICINE

## 2018-08-23 PROCEDURE — 99999 PR PBB SHADOW E&M-EST. PATIENT-LVL IV: CPT | Mod: PBBFAC,,, | Performed by: INTERNAL MEDICINE

## 2018-08-23 PROCEDURE — 3079F DIAST BP 80-89 MM HG: CPT | Mod: CPTII,S$GLB,, | Performed by: INTERNAL MEDICINE

## 2018-08-23 RX ORDER — AZITHROMYCIN 250 MG/1
250 TABLET, FILM COATED ORAL DAILY
Qty: 6 TABLET | Refills: 1 | Status: SHIPPED | OUTPATIENT
Start: 2018-08-23 | End: 2018-11-28

## 2018-08-23 RX ORDER — FLUTICASONE PROPIONATE 50 MCG
2 SPRAY, SUSPENSION (ML) NASAL DAILY
Qty: 3 BOTTLE | Refills: 4 | Status: SHIPPED | OUTPATIENT
Start: 2018-08-23 | End: 2019-08-23

## 2018-08-23 RX ORDER — FLUTICASONE FUROATE AND VILANTEROL 200; 25 UG/1; UG/1
1 POWDER RESPIRATORY (INHALATION) DAILY
Qty: 1 EACH | Refills: 11 | Status: SHIPPED | OUTPATIENT
Start: 2018-08-23 | End: 2019-09-30 | Stop reason: SDUPTHER

## 2018-08-23 RX ORDER — ALBUTEROL SULFATE 90 UG/1
2 AEROSOL, METERED RESPIRATORY (INHALATION) EVERY 6 HOURS
Qty: 1 INHALER | Refills: 12 | Status: SHIPPED | OUTPATIENT
Start: 2018-08-23 | End: 2019-09-30 | Stop reason: SDUPTHER

## 2018-08-23 RX ORDER — PREDNISONE 20 MG/1
TABLET ORAL
Qty: 20 TABLET | Refills: 1 | Status: SHIPPED | OUTPATIENT
Start: 2018-08-23 | End: 2019-01-22

## 2018-08-23 RX ORDER — ACETAMINOPHEN, DIPHENHYDRAMINE HCL, PHENYLEPHRINE HCL 325; 25; 5 MG/1; MG/1; MG/1
1 TABLET ORAL NIGHTLY
COMMUNITY

## 2018-08-23 RX ORDER — IPRATROPIUM BROMIDE AND ALBUTEROL SULFATE 2.5; .5 MG/3ML; MG/3ML
3 SOLUTION RESPIRATORY (INHALATION) EVERY 6 HOURS
Qty: 120 VIAL | Refills: 12 | Status: SHIPPED | OUTPATIENT
Start: 2018-08-23 | End: 2019-03-07 | Stop reason: SDUPTHER

## 2018-08-23 NOTE — PROGRESS NOTES
Subjective:       Patient ID: Maldonado Deleon is a 83 y.o. female.    Chief Complaint: She       COPD    HPI   Problems with balance  Seeing physical therapy    COPD  She presents for evaluation and treatment of COPD. The patient is currently having symptoms / an exacerbation. Current symptoms include chronic dyspnea, cough productive of yellow sputum in small amounts and wheezing. Symptoms have been present since about a month ago and have been gradually worsening. She denies chills and fever. Associated symptoms include poor exercise tolerance and shortness of breath.  This episode appears to have been triggered by dust. Treatments tried for the current exacerbation: albuterol nebulizer. The patient has been having similar episodes for approximately 10 years. She uses 1 pillows at night. Patient currently is on oxygen at 2 L/min per nasal cannula.. The patient is having no constitutional symptoms, denying fever, chills, anorexia, or weight loss. The patient has been hospitalized for this condition before. She quit smoking approximately many years years ago.    [place]:      Past Medical History:   Diagnosis Date    Anticoagulant long-term use     Plavix    Atypical chest pain 8/26/2013    Back pain     CAD (coronary artery disease)     CAD in native artery 10/5/2015    Carotid artery disease without cerebral infarction 8/26/2013    Colon cancer     resection and chemo.    COPD (chronic obstructive pulmonary disease)     Depression     Diabetes mellitus type II     Diaphragmatic hernia without obstruction and without gangrene 8/13/2015    Emphysema of lung     Encounter for blood transfusion     Gallstone pancreatitis     History of PTCA 10/5/2015    Hypertension     Lymphocytic colitis     Meningioma     Neuropathy 8/2/2017    OA (osteoarthritis)     Obesity 8/26/2013    Oxygen dependent     q hs and prn     Past Surgical History:   Procedure Laterality Date    ABDOMINAL SURGERY      BACK  SURGERY      x 2    CATARACT EXTRACTION      CHOLECYSTECTOMY      COLECTOMY      CORONARY ANGIOPLASTY      EYE SURGERY      JOINT REPLACEMENT Right     hip replacement x 4    PARATHYROIDECTOMY      SHOULDER SURGERY Right     TOTAL HIP ARTHROPLASTY Left     TOTAL KNEE ARTHROPLASTY Bilateral      Social History     Socioeconomic History    Marital status:      Spouse name: Not on file    Number of children: Not on file    Years of education: Not on file    Highest education level: Not on file   Social Needs    Financial resource strain: Not on file    Food insecurity - worry: Not on file    Food insecurity - inability: Not on file    Transportation needs - medical: Not on file    Transportation needs - non-medical: Not on file   Occupational History    Not on file   Tobacco Use    Smoking status: Former Smoker     Packs/day: 2.50     Years: 50.00     Pack years: 125.00     Types: Cigarettes     Last attempt to quit: 1987     Years since quittin.0    Smokeless tobacco: Never Used   Substance and Sexual Activity    Alcohol use: No     Alcohol/week: 0.0 oz    Drug use: No    Sexual activity: No   Other Topics Concern    Not on file   Social History Narrative    Not on file     Review of Systems   Constitutional: Positive for fatigue. Negative for fever.   HENT: Positive for postnasal drip, rhinorrhea and congestion.    Eyes: Negative for redness and itching.   Respiratory: Positive for cough, sputum production, shortness of breath, dyspnea on extertion, use of rescue inhaler and Paroxysmal Nocturnal Dyspnea.    Cardiovascular: Negative for chest pain, palpitations and leg swelling.   Genitourinary: Negative for difficulty urinating and hematuria.   Endocrine: Negative for cold intolerance and heat intolerance.    Skin: Negative for rash.   Gastrointestinal: Negative for nausea and abdominal pain.   Neurological: Negative for dizziness, syncope, weakness and light-headedness.    Hematological: Negative for adenopathy. Does not bruise/bleed easily.   Psychiatric/Behavioral: Negative for sleep disturbance. The patient is not nervous/anxious.        Objective:      Physical Exam   Constitutional: She is oriented to person, place, and time. She appears well-developed and well-nourished.   HENT:   Head: Normocephalic and atraumatic.   Mouth/Throat: Oropharyngeal exudate present.   Eyes: Conjunctivae are normal. Pupils are equal, round, and reactive to light.   Neck: Neck supple. No JVD present. No tracheal deviation present. No thyromegaly present.   Cardiovascular: Normal rate, regular rhythm and normal heart sounds.   Pulmonary/Chest: Effort normal. No respiratory distress. She has decreased breath sounds. She has wheezes in the right lower field and the left lower field. She has no rhonchi. She has no rales. She exhibits no tenderness.   Abdominal: Soft. Bowel sounds are normal.   Musculoskeletal: Normal range of motion. She exhibits no edema.   Lymphadenopathy:     She has no cervical adenopathy.   Neurological: She is alert and oriented to person, place, and time.   Skin: Skin is warm and dry.   Nursing note and vitals reviewed.    Personal Diagnostic Review  Chest x-ray: hyperinflation     ECHO CONCLUSIONS     1 - Moderate left atrial enlargement.     2 - Concentric hypertrophy.     3 - Normal left ventricular systolic function (EF 60-65%).     4 - Left ventricular diastolic dysfunction.     5 - Normal right ventricular systolic function .     6 - The estimated PA systolic pressure is greater than 34 mmHg.     7 - Trivial to mild tricuspid regurgitation.     This document has been electronically    SIGNED BY: Hayes Chavez MD On: 01/13/2017 08:49    Office Spirometry Results:      Impression           1.  2.0 x 1.4 cm parenchymal nodule/mass within the posterior lateral margin of the right lower lobe.  This correlates well with recent CXR findings.    2.  8 .1-mm subpleural nodule  posterior basal segment left lower lobe.     3.  Emphysematous disease with scattered areas pleural parenchymal scarring and ground glass opacity.    4.  Post surgical changes as above.    5.  Additional findings as above.      Electronically signed by: ELTON MEJIA III, MD  Date: 10/19/15       No flowsheet data found.  Pulmonary Studies Review 8/23/2018   SpO2 93   Height 64.200   Weight 3713.6   BMI (Calculated) 39.7   Predicted Distance 158.04   Predicted Distance Meters (Calculated) 301.87         Assessment:       1. Lung nodule - [place]    2. Sinus congestion    3. Chronic obstructive pulmonary disease, unspecified COPD type    4. COPD exacerbation    5. Essential hypertension    6. Diaphragmatic hernia without obstruction and without gangrene    7. Abnormal CXR (chest x-ray)        Outpatient Encounter Medications as of 8/23/2018   Medication Sig Dispense Refill    acetaminophen (TYLENOL) 325 MG tablet Take 650 mg by mouth every 6 (six) hours as needed for Pain.      albuterol-ipratropium (DUO-NEB) 2.5 mg-0.5 mg/3 mL nebulizer solution Take 3 mLs by nebulization every 6 (six) hours. 120 vial 12    amlodipine (NORVASC) 2.5 MG tablet Take 1 tablet (2.5 mg total) by mouth once daily. 30 tablet 11    aspirin (ECOTRIN) 81 MG EC tablet Take 81 mg by mouth once daily.      atenolol (TENORMIN) 25 MG tablet TAKE ONE TABLET BY MOUTH ONCE DAILY IN THE EVENING 90 tablet 3    benazepril (LOTENSIN) 20 MG tablet TAKE ONE TABLET BY MOUTH TWICE DAILY 180 tablet 3    calcium carbonate (OS-RICHY) 600 mg (1,500 mg) Tab Take 600 mg by mouth once daily.       clopidogrel (PLAVIX) 75 mg tablet TAKE ONE TABLET BY MOUTH ONCE DAILY 90 tablet 1    gabapentin (NEURONTIN) 300 MG capsule Take 2 in the morning, 1 at noon and 2 at night 150 capsule 3    hydrALAZINE (APRESOLINE) 10 MG tablet Take 1 tablet (10 mg total) by mouth 3 (three) times daily. 90 tablet 11    HYDROcodone-acetaminophen (NORCO)  mg per tablet Take  1 tablet by mouth every 12 (twelve) hours as needed for Pain. 60 tablet 0    isosorbide mononitrate (IMDUR) 60 MG 24 hr tablet Take 60 mg by mouth once daily.      melatonin 10 mg Tab Take 1 tablet by mouth every evening.      milk thistle 175 mg tablet Take 175 mg by mouth once daily.      nebulizer accessories Kit Use with nebulizer 1 kit prn    nitroGLYCERIN (NITROSTAT) 0.4 MG SL tablet Place 1 tablet (0.4 mg total) under the tongue every 5 (five) minutes as needed for Chest pain. 60 tablet 12    rosuvastatin (CRESTOR) 20 MG tablet Take 1 tablet (20 mg total) by mouth every evening. 90 tablet 3    sertraline (ZOLOFT) 100 MG tablet TAKE ONE TABLET BY MOUTH ONCE DAILY IN THE EVENING 90 tablet 3    sertraline (ZOLOFT) 50 MG tablet TAKE ONE TABLET BY MOUTH ONCE DAILY 90 tablet 1    UNABLE TO FIND 1 tablet once daily. MULTIVIT-iron-FA-calcium-mins tab  9mg-iron-400mcg      [DISCONTINUED] albuterol-ipratropium 2.5mg-0.5mg/3mL (DUO-NEB) 0.5 mg-3 mg(2.5 mg base)/3 mL nebulizer solution Take 3 mLs by nebulization every 6 (six) hours. 120 vial 12    albuterol 90 mcg/actuation inhaler Inhale 2 puffs into the lungs every 6 (six) hours. 1 Inhaler 12    azithromycin (ZITHROMAX Z-ROBBIN) 250 MG tablet Take 1 tablet (250 mg total) by mouth once daily. 500 mg on day 1 (two tablets) followed by 250 mg once daily on days 2-5 6 tablet 1    fluticasone (FLONASE) 50 mcg/actuation nasal spray 2 sprays (100 mcg total) by Each Nare route once daily. 3 Bottle 4    fluticasone-vilanterol (BREO ELLIPTA) 200-25 mcg/dose DsDv diskus inhaler Inhale 1 puff into the lungs once daily. Controller 1 each 11    ipratropium (ATROVENT) 42 mcg (0.06 %) nasal spray 2 sprays by Nasal route 4 (four) times daily. As needed for rhinitis 15 mL 11    predniSONE (DELTASONE) 20 MG tablet Prednisone 60 mg/ day for 3 days, 40 mg/day for 3 days,20 mg/ day for 3 days, (1/2 tablet )10 mg a day for 3 days. 20 tablet 1    [DISCONTINUED] atenolol  (TENORMIN) 25 MG tablet Take 1 tablet (25 mg total) by mouth every evening. 90 tablet 3    [DISCONTINUED] azithromycin (ZITHROMAX Z-ROBBIN) 250 MG tablet Take 1 tablet (250 mg total) by mouth once daily. 500 mg on day 1 (two tablets) followed by 250 mg once daily on days 2-5 6 tablet 1    [DISCONTINUED] hydrocodone-acetaminophen 7.5-325mg (NORCO) 7.5-325 mg per tablet Take 1 tablet by mouth every 12 (twelve) hours as needed for Pain. 60 tablet 0    [DISCONTINUED] isosorbide mononitrate (IMDUR) 60 MG 24 hr tablet Take 1 tablet (60 mg total) by mouth 2 (two) times daily. 180 tablet 3    [DISCONTINUED] metFORMIN (GLUCOPHAGE) 500 MG tablet TAKE ONE TABLET BY MOUTH TWICE DAILY WITH MEALS 180 tablet 2    [DISCONTINUED] metFORMIN (GLUCOPHAGE) 500 MG tablet Take 1 tablet (500 mg total) by mouth 2 (two) times daily with meals. 180 tablet 2    [DISCONTINUED] predniSONE (DELTASONE) 20 MG tablet Prednisone 60 mg/ day for 3 days, 40 mg/day for 3 days,20 mg/ day for 3 days, (1/2 tablet )10 mg a day for 3 days. 20 tablet 1    [DISCONTINUED] sertraline (ZOLOFT) 50 MG tablet Take 1 tablet (50 mg total) by mouth once daily. 90 tablet 2     No facility-administered encounter medications on file as of 2018.      Orders Placed This Encounter   Procedures    Six Minute Walk Test to qualify for Home Oxygen     Standing Status:   Future     Standing Expiration Date:   2019     Plan:       Requested Prescriptions     Signed Prescriptions Disp Refills    fluticasone (FLONASE) 50 mcg/actuation nasal spray 3 Bottle 4     Si sprays (100 mcg total) by Each Nare route once daily.    predniSONE (DELTASONE) 20 MG tablet 20 tablet 1     Sig: Prednisone 60 mg/ day for 3 days, 40 mg/day for 3 days,20 mg/ day for 3 days, (1/2 tablet )10 mg a day for 3 days.    azithromycin (ZITHROMAX Z-ROBBIN) 250 MG tablet 6 tablet 1     Sig: Take 1 tablet (250 mg total) by mouth once daily. 500 mg on day 1 (two tablets) followed by 250 mg once  daily on days 2-5    fluticasone-vilanterol (BREO ELLIPTA) 200-25 mcg/dose DsDv diskus inhaler 1 each 11     Sig: Inhale 1 puff into the lungs once daily. Controller    albuterol-ipratropium (DUO-NEB) 2.5 mg-0.5 mg/3 mL nebulizer solution 120 vial 12     Sig: Take 3 mLs by nebulization every 6 (six) hours.    albuterol 90 mcg/actuation inhaler 1 Inhaler 12     Sig: Inhale 2 puffs into the lungs every 6 (six) hours.     Lung nodule - [place]    Sinus congestion  -     fluticasone (FLONASE) 50 mcg/actuation nasal spray; 2 sprays (100 mcg total) by Each Nare route once daily.  Dispense: 3 Bottle; Refill: 4    Chronic obstructive pulmonary disease, unspecified COPD type  -     fluticasone-vilanterol (BREO ELLIPTA) 200-25 mcg/dose DsDv diskus inhaler; Inhale 1 puff into the lungs once daily. Controller  Dispense: 1 each; Refill: 11  -     albuterol-ipratropium (DUO-NEB) 2.5 mg-0.5 mg/3 mL nebulizer solution; Take 3 mLs by nebulization every 6 (six) hours.  Dispense: 120 vial; Refill: 12  -     albuterol 90 mcg/actuation inhaler; Inhale 2 puffs into the lungs every 6 (six) hours.  Dispense: 1 Inhaler; Refill: 12  -     Six Minute Walk Test to qualify for Home Oxygen; Future    COPD exacerbation  -     predniSONE (DELTASONE) 20 MG tablet; Prednisone 60 mg/ day for 3 days, 40 mg/day for 3 days,20 mg/ day for 3 days, (1/2 tablet )10 mg a day for 3 days.  Dispense: 20 tablet; Refill: 1  -     azithromycin (ZITHROMAX Z-ROBBIN) 250 MG tablet; Take 1 tablet (250 mg total) by mouth once daily. 500 mg on day 1 (two tablets) followed by 250 mg once daily on days 2-5  Dispense: 6 tablet; Refill: 1    Essential hypertension  Comments:  follow up with PCP    Diaphragmatic hernia without obstruction and without gangrene    Abnormal CXR (chest x-ray)           Follow-up in about 6 months (around 2/23/2019) for 6 min walk.    MEDICAL DECISION MAKING: Moderate to high complexity.  Overall, the multiple problems listed are of moderate to  high severity that may impact quality of life and activities of daily living. Side effects of medications, treatment plan as well as options and alternatives reviewed and discussed with patient. There was counseling of patient concerning these issues.    Total time spent in face to face counseling and coordination of care - 25  minutes over 50% of time was used in discussion of prognosis, risks, benefits of treatment, instructions and compliance with regimen . Discussion with other physicians or health care providers (DME, NP, pharmacy, respiratory therapy) occurred.

## 2018-08-23 NOTE — LETTER
August 23, 2018      Bryson Nogueira MD  [address] [facility] [address] [place] [address]           [facility]  [address] [place] [address]  Phone: [phone]  Fax: [phone]          Patient: Maldonado Deleon   MR Number: [id number]   YOB: 1935   Date of Visit: 8/23/2018       Dear No ref. provider found:    Thank you for referring Maldonado Deleon to me for evaluation. Attached you will find relevant portions of my assessment and plan of care.    If you have questions, please do not hesitate to call me. I look forward to following Maldonado Deleon along with you.    Sincerely,    Dale Patel MD    Enclosure  CC:  No Recipients    IIf you would like to receive this communication electronically, please contact [email or web address] or [phone] to request [employer] access.    [employer] is a tool which provides read-only access to select patient information with whom you have a relationship. Its easy to use and provides real time access to review your patients record including encounter summaries, notes, results, and demographic information.    If you feel you have received this communication in error or would no longer like to receive these types of communications, please e-mail [email or web address]

## 2018-08-27 LAB
BRPFT: ABNORMAL
FEF 25 75 CHG: 27.4 %
FEF 25 75 LLN: 0.63
FEF 25 75 POST REF: 60.1 %
FEF 25 75 POST: 0.92 L/S (ref 0.63–2.42)
FEF 25 75 PRE REF: 47.2 %
FEF 25 75 PRE: 0.72 L/S (ref 0.63–2.42)
FEF 25 75 REF: 1.53
FET100 CHG: -9.1 %
FET100 POST: 8.21 SEC
FET100 PRE: 9.03 SEC
FEV1 CHG: 6.2 %
FEV1 FVC CHG: 4.1 %
FEV1 FVC LLN: 62
FEV1 FVC POST REF: 91.9 %
FEV1 FVC POST: 70.62 % (ref 62.04–91.66)
FEV1 FVC PRE REF: 88.3 %
FEV1 FVC PRE: 67.84 % (ref 62.04–91.66)
FEV1 FVC REF: 77
FEV1 LLN: 1.33
FEV1 POST REF: 60.1 %
FEV1 POST: 1.15 L (ref 1.33–2.49)
FEV1 PRE REF: 56.6 %
FEV1 PRE: 1.08 L (ref 1.33–2.49)
FEV1 REF: 1.91
FEV6 CHG: 4.1 %
FEV6 LLN: 1.76
FEV6 POST REF: 64.6 %
FEV6 POST: 1.58 L (ref 1.76–3.13)
FEV6 PRE REF: 62.1 %
FEV6 PRE: 1.52 L (ref 1.76–3.13)
FEV6 REF: 2.44
FVC CHG: 2 %
FVC LLN: 1.76
FVC POST REF: 64.5 %
FVC POST: 1.63 L (ref 1.76–3.29)
FVC PRE REF: 63.2 %
FVC PRE: 1.59 L (ref 1.76–3.29)
FVC REF: 2.52
MVV LLN: 62
MVV REF: 73
PEF CHG: -8.7 %
PEF LLN: 3
PEF POST REF: 32.5 %
PEF POST: 1.54 L/S (ref 3–6.44)
PEF PRE REF: 35.6 %
PEF PRE: 1.68 L/S (ref 3–6.44)
PEF REF: 4.72

## 2018-10-08 RX ORDER — GABAPENTIN 300 MG/1
CAPSULE ORAL
Qty: 150 CAPSULE | Refills: 3 | Status: SHIPPED | OUTPATIENT
Start: 2018-10-08 | End: 2019-07-16 | Stop reason: SDUPTHER

## 2018-10-08 RX ORDER — LEVOCETIRIZINE DIHYDROCHLORIDE 5 MG/1
TABLET, FILM COATED ORAL
Qty: 30 TABLET | Refills: 11 | Status: SHIPPED | OUTPATIENT
Start: 2018-10-08

## 2018-10-08 RX ORDER — SERTRALINE HYDROCHLORIDE 50 MG/1
TABLET, FILM COATED ORAL
Qty: 90 TABLET | Refills: 2 | Status: SHIPPED | OUTPATIENT
Start: 2018-10-08 | End: 2018-12-10 | Stop reason: SDUPTHER

## 2018-11-04 RX ORDER — BENAZEPRIL HYDROCHLORIDE 20 MG/1
TABLET ORAL
Qty: 180 TABLET | Refills: 3 | Status: SHIPPED | OUTPATIENT
Start: 2018-11-04 | End: 2019-01-02 | Stop reason: SDUPTHER

## 2018-11-27 DIAGNOSIS — I25.10 ATHEROSCLEROSIS OF NATIVE CORONARY ARTERY WITHOUT ANGINA PECTORIS, UNSPECIFIED WHETHER NATIVE OR TRANSPLANTED HEART: ICD-10-CM

## 2018-11-28 ENCOUNTER — OFFICE VISIT (OUTPATIENT)
Dept: INTERNAL MEDICINE | Facility: CLINIC | Age: 83
End: 2018-11-28
Payer: MEDICARE

## 2018-11-28 VITALS
OXYGEN SATURATION: 90 % | TEMPERATURE: 98 F | HEART RATE: 64 BPM | HEIGHT: 64 IN | BODY MASS INDEX: 40.16 KG/M2 | WEIGHT: 235.25 LBS

## 2018-11-28 DIAGNOSIS — G62.9 NEUROPATHY: ICD-10-CM

## 2018-11-28 DIAGNOSIS — C18.9 MALIGNANT NEOPLASM OF COLON, UNSPECIFIED PART OF COLON: Primary | ICD-10-CM

## 2018-11-28 DIAGNOSIS — I10 ESSENTIAL HYPERTENSION: ICD-10-CM

## 2018-11-28 DIAGNOSIS — J44.9 CHRONIC OBSTRUCTIVE PULMONARY DISEASE, UNSPECIFIED COPD TYPE: ICD-10-CM

## 2018-11-28 PROCEDURE — 90662 IIV NO PRSV INCREASED AG IM: CPT | Mod: S$GLB,,, | Performed by: FAMILY MEDICINE

## 2018-11-28 PROCEDURE — 1100F PTFALLS ASSESS-DOCD GE2>/YR: CPT | Mod: CPTII,S$GLB,, | Performed by: FAMILY MEDICINE

## 2018-11-28 PROCEDURE — 99999 PR PBB SHADOW E&M-EST. PATIENT-LVL III: CPT | Mod: PBBFAC,,, | Performed by: FAMILY MEDICINE

## 2018-11-28 PROCEDURE — 3288F FALL RISK ASSESSMENT DOCD: CPT | Mod: CPTII,S$GLB,, | Performed by: FAMILY MEDICINE

## 2018-11-28 PROCEDURE — G0008 ADMIN INFLUENZA VIRUS VAC: HCPCS | Mod: S$GLB,,, | Performed by: FAMILY MEDICINE

## 2018-11-28 PROCEDURE — 99214 OFFICE O/P EST MOD 30 MIN: CPT | Mod: 25,S$GLB,, | Performed by: FAMILY MEDICINE

## 2018-11-28 RX ORDER — CLOPIDOGREL BISULFATE 75 MG/1
TABLET ORAL
Qty: 90 TABLET | Refills: 1 | Status: SHIPPED | OUTPATIENT
Start: 2018-11-28 | End: 2019-01-02 | Stop reason: SDUPTHER

## 2018-11-28 RX ORDER — PREGABALIN 50 MG/1
50 CAPSULE ORAL 2 TIMES DAILY
Qty: 120 CAPSULE | Refills: 0 | Status: SHIPPED | OUTPATIENT
Start: 2018-11-28 | End: 2019-12-05 | Stop reason: CLARIF

## 2018-11-28 NOTE — PROGRESS NOTES
Patient, Maldonado Deleon (MRN #[id number]), presented with a recorded BMI of 40.38 kg/m^2 consistent with the definition of morbid obesity (ICD-10 E66.01). The patient's morbid obesity was monitored, evaluated, addressed and/or treated. This addendum to the medical record is made on 11/28/2018.

## 2018-11-28 NOTE — PATIENT INSTRUCTIONS
5-7 days: take lyrica and 1 gabapentin at night. May have to do for 2 days lyrica alone at night to be sure it does not cause sleepiness.     If tolerating:    Move to 1 Lyrica in the morning and 1 at night

## 2018-11-28 NOTE — PROGRESS NOTES
Subjective:       Patient ID: Maldonado Deleno is a 83 y.o. female.    Chief Complaint: Follow-up    Neuropathy:      Pt is a 83 year old who neuropathy and is falling. Pt's sugars are well controlled. BP is well controlled. Discussed with pt switch from gabapentin to lyrica. Can not do PT      Review of Systems   Constitutional: Negative.    Respiratory: Negative.    Cardiovascular: Negative.    Gastrointestinal: Negative.    Musculoskeletal: Negative.    Neurological: Positive for numbness. Negative for tremors and weakness.       Objective:      Physical Exam   Constitutional: She is oriented to person, place, and time. She appears well-developed and well-nourished.   Cardiovascular: Normal rate and regular rhythm. Exam reveals no friction rub.   No murmur heard.  Pulmonary/Chest: Effort normal and breath sounds normal. No stridor.   Neurological: She is alert and oriented to person, place, and time.   Skin: Skin is warm and dry.       Assessment:       1. Malignant neoplasm of colon, unspecified part of colon    2. Neuropathy    3. Essential hypertension    4. Chronic obstructive pulmonary disease, unspecified COPD type        Plan:       Malignant neoplasm of colon, unspecified part of colon  Comments:  will schedule for colonscopy  Orders:  -     Case request GI: COLONOSCOPY    Neuropathy  Comments:  Will slowly switch from gabapentin to lyrica.    Essential hypertension  Comments:  BP is well controlled    Chronic obstructive pulmonary disease, unspecified COPD type  Comments:  stable    Other orders  -     pregabalin (LYRICA) 50 MG capsule; Take 1 capsule (50 mg total) by mouth 2 (two) times daily.  Dispense: 120 capsule; Refill: 0

## 2018-11-29 ENCOUNTER — TELEPHONE (OUTPATIENT)
Dept: INTERNAL MEDICINE | Facility: CLINIC | Age: 83
End: 2018-11-29

## 2018-11-29 NOTE — TELEPHONE ENCOUNTER
----- Message from Yuko Alvarado sent at 11/29/2018  1:56 PM CST -----  Contact: pt  She is calling in regards with questions concerning her medication and would like to speak with nurse victoria, please advise [phone] (home)

## 2018-12-10 RX ORDER — SERTRALINE HYDROCHLORIDE 100 MG/1
TABLET, FILM COATED ORAL
Qty: 90 TABLET | Refills: 3 | Status: SHIPPED | OUTPATIENT
Start: 2018-12-10 | End: 2019-01-22 | Stop reason: SDUPTHER

## 2018-12-10 RX ORDER — SERTRALINE HYDROCHLORIDE 50 MG/1
50 TABLET, FILM COATED ORAL DAILY
Qty: 90 TABLET | Refills: 2 | Status: SHIPPED | OUTPATIENT
Start: 2018-12-10

## 2018-12-10 NOTE — TELEPHONE ENCOUNTER
----- Message from Xiao Johnston sent at 12/10/2018  2:56 PM CST -----  Contact: pt  The pt request a call concerning a med refill,no additional info given, can be reached at [phone]///[email or web address]

## 2018-12-14 ENCOUNTER — TELEPHONE (OUTPATIENT)
Dept: INTERNAL MEDICINE | Facility: CLINIC | Age: 83
End: 2018-12-14

## 2018-12-14 NOTE — TELEPHONE ENCOUNTER
Patients grandson informed that patient has been vomiting and diarrhea. He stated that he believes it was something she ate. He was informed to give patient fluid and blank light food. He was informed that if patient does not keep that down then to bring her in to be seen

## 2018-12-14 NOTE — TELEPHONE ENCOUNTER
----- Message from Patience Campbell sent at 12/14/2018  1:04 PM CST -----  Contact: Isauro/pt grandson  Call caller regarding pt throwing up and having diarrhea.    [phone] (home)

## 2019-01-02 DIAGNOSIS — I25.10 ATHEROSCLEROSIS OF NATIVE CORONARY ARTERY WITHOUT ANGINA PECTORIS, UNSPECIFIED WHETHER NATIVE OR TRANSPLANTED HEART: ICD-10-CM

## 2019-01-02 DIAGNOSIS — E78.2 MIXED HYPERLIPIDEMIA: ICD-10-CM

## 2019-01-02 DIAGNOSIS — I10 ESSENTIAL HYPERTENSION: ICD-10-CM

## 2019-01-02 RX ORDER — BENAZEPRIL HYDROCHLORIDE 20 MG/1
20 TABLET ORAL 2 TIMES DAILY
Qty: 180 TABLET | Refills: 3 | Status: SHIPPED | OUTPATIENT
Start: 2019-01-02 | End: 2020-03-20

## 2019-01-02 RX ORDER — ATENOLOL 25 MG/1
25 TABLET ORAL DAILY
Qty: 90 TABLET | Refills: 3 | Status: SHIPPED | OUTPATIENT
Start: 2019-01-02 | End: 2019-12-05 | Stop reason: ALTCHOICE

## 2019-01-02 RX ORDER — ROSUVASTATIN CALCIUM 20 MG/1
20 TABLET, COATED ORAL NIGHTLY
Qty: 90 TABLET | Refills: 3 | Status: SHIPPED | OUTPATIENT
Start: 2019-01-02 | End: 2020-01-29

## 2019-01-02 RX ORDER — CLOPIDOGREL BISULFATE 75 MG/1
75 TABLET ORAL DAILY
Qty: 90 TABLET | Refills: 3 | Status: SHIPPED | OUTPATIENT
Start: 2019-01-02 | End: 2020-01-17

## 2019-01-02 RX ORDER — NITROGLYCERIN 0.4 MG/1
0.4 TABLET SUBLINGUAL EVERY 5 MIN PRN
Qty: 60 TABLET | Refills: 12 | Status: SHIPPED | OUTPATIENT
Start: 2019-01-02

## 2019-01-15 ENCOUNTER — TELEPHONE (OUTPATIENT)
Dept: ENDOSCOPY | Facility: HOSPITAL | Age: 84
End: 2019-01-15

## 2019-01-21 DIAGNOSIS — I10 ESSENTIAL HYPERTENSION: Primary | ICD-10-CM

## 2019-01-22 ENCOUNTER — OFFICE VISIT (OUTPATIENT)
Dept: CARDIOLOGY | Facility: CLINIC | Age: 84
End: 2019-01-22
Payer: MEDICARE

## 2019-01-22 ENCOUNTER — CLINICAL SUPPORT (OUTPATIENT)
Dept: CARDIOLOGY | Facility: CLINIC | Age: 84
End: 2019-01-22
Payer: MEDICARE

## 2019-01-22 VITALS
HEART RATE: 59 BPM | BODY MASS INDEX: 39.4 KG/M2 | DIASTOLIC BLOOD PRESSURE: 84 MMHG | WEIGHT: 230.81 LBS | SYSTOLIC BLOOD PRESSURE: 160 MMHG | HEIGHT: 64 IN

## 2019-01-22 DIAGNOSIS — E66.01 CLASS 2 SEVERE OBESITY DUE TO EXCESS CALORIES WITH SERIOUS COMORBIDITY AND BODY MASS INDEX (BMI) OF 39.0 TO 39.9 IN ADULT: ICD-10-CM

## 2019-01-22 DIAGNOSIS — E78.2 MIXED HYPERLIPIDEMIA: ICD-10-CM

## 2019-01-22 DIAGNOSIS — J44.9 CHRONIC OBSTRUCTIVE PULMONARY DISEASE, UNSPECIFIED COPD TYPE: ICD-10-CM

## 2019-01-22 DIAGNOSIS — R60.0 EDEMA OF BOTH LEGS: ICD-10-CM

## 2019-01-22 DIAGNOSIS — E11.9 CONTROLLED TYPE 2 DIABETES MELLITUS WITHOUT COMPLICATION, WITHOUT LONG-TERM CURRENT USE OF INSULIN: ICD-10-CM

## 2019-01-22 DIAGNOSIS — I65.23 ASYMPTOMATIC STENOSIS OF BOTH CAROTID ARTERIES WITHOUT INFARCTION: ICD-10-CM

## 2019-01-22 DIAGNOSIS — I51.89 DIASTOLIC DYSFUNCTION: ICD-10-CM

## 2019-01-22 DIAGNOSIS — I10 ESSENTIAL HYPERTENSION: ICD-10-CM

## 2019-01-22 DIAGNOSIS — Z98.61 HISTORY OF PTCA: ICD-10-CM

## 2019-01-22 DIAGNOSIS — I20.89 CHRONIC STABLE ANGINA: Primary | ICD-10-CM

## 2019-01-22 DIAGNOSIS — I77.1 STENOSIS OF RIGHT SUBCLAVIAN ARTERY: ICD-10-CM

## 2019-01-22 DIAGNOSIS — I25.10 CAD IN NATIVE ARTERY: ICD-10-CM

## 2019-01-22 PROCEDURE — 1100F PTFALLS ASSESS-DOCD GE2>/YR: CPT | Mod: CPTII,S$GLB,, | Performed by: INTERNAL MEDICINE

## 2019-01-22 PROCEDURE — 3077F PR MOST RECENT SYSTOLIC BLOOD PRESSURE >= 140 MM HG: ICD-10-PCS | Mod: CPTII,S$GLB,, | Performed by: INTERNAL MEDICINE

## 2019-01-22 PROCEDURE — 93000 EKG 12-LEAD: ICD-10-PCS | Mod: S$GLB,,, | Performed by: INTERNAL MEDICINE

## 2019-01-22 PROCEDURE — 99999 PR PBB SHADOW E&M-EST. PATIENT-LVL III: ICD-10-PCS | Mod: PBBFAC,,, | Performed by: INTERNAL MEDICINE

## 2019-01-22 PROCEDURE — 3079F PR MOST RECENT DIASTOLIC BLOOD PRESSURE 80-89 MM HG: ICD-10-PCS | Mod: CPTII,S$GLB,, | Performed by: INTERNAL MEDICINE

## 2019-01-22 PROCEDURE — 93000 ELECTROCARDIOGRAM COMPLETE: CPT | Mod: S$GLB,,, | Performed by: INTERNAL MEDICINE

## 2019-01-22 PROCEDURE — 1100F PR PT FALLS ASSESS DOC 2+ FALLS/FALL W/INJURY/YR: ICD-10-PCS | Mod: CPTII,S$GLB,, | Performed by: INTERNAL MEDICINE

## 2019-01-22 PROCEDURE — 99999 PR PBB SHADOW E&M-EST. PATIENT-LVL III: CPT | Mod: PBBFAC,,, | Performed by: INTERNAL MEDICINE

## 2019-01-22 PROCEDURE — 3079F DIAST BP 80-89 MM HG: CPT | Mod: CPTII,S$GLB,, | Performed by: INTERNAL MEDICINE

## 2019-01-22 PROCEDURE — 99499 RISK ADDL DX/OHS AUDIT: ICD-10-PCS | Mod: S$GLB,,, | Performed by: INTERNAL MEDICINE

## 2019-01-22 PROCEDURE — 99214 PR OFFICE/OUTPT VISIT, EST, LEVL IV, 30-39 MIN: ICD-10-PCS | Mod: S$GLB,,, | Performed by: INTERNAL MEDICINE

## 2019-01-22 PROCEDURE — 3288F FALL RISK ASSESSMENT DOCD: CPT | Mod: CPTII,S$GLB,, | Performed by: INTERNAL MEDICINE

## 2019-01-22 PROCEDURE — 99214 OFFICE O/P EST MOD 30 MIN: CPT | Mod: S$GLB,,, | Performed by: INTERNAL MEDICINE

## 2019-01-22 PROCEDURE — 3288F PR FALLS RISK ASSESSMENT DOCUMENTED: ICD-10-PCS | Mod: CPTII,S$GLB,, | Performed by: INTERNAL MEDICINE

## 2019-01-22 PROCEDURE — 3077F SYST BP >= 140 MM HG: CPT | Mod: CPTII,S$GLB,, | Performed by: INTERNAL MEDICINE

## 2019-01-22 PROCEDURE — 99499 UNLISTED E&M SERVICE: CPT | Mod: S$GLB,,, | Performed by: INTERNAL MEDICINE

## 2019-01-22 RX ORDER — SERTRALINE HYDROCHLORIDE 100 MG/1
100 TABLET, FILM COATED ORAL DAILY
COMMUNITY
End: 2020-01-29

## 2019-01-22 NOTE — PROGRESS NOTES
Subjective:    Patient ID:  Maldonado Deleon is a 83 y.o. female who presents for evaluation of Coronary Artery Disease; Carotid Artery Disease; Hypertension; Hyperlipidemia; Risk Factor Management For Atherosclerosis; and Peripheral Arterial Disease      HPI  Pt presents for f/u.  Her current medical conditions include COPD, R subclavian stenosis, carotid artery disease, hypertension, hyperlipidemia, diabetes (former smoker) and CAD. Nonsmoker.  On home O2 qhs.   s/p LHC 6/15 for anginal sxs and had PCI of calcified 90% mid-RCA stenosis with Diamondback atherectomy and LINDSAY x one. Her mid-LAD stenosis was overal stable in 50 - 60% range and medical mgt advised.  Carotid u/s 7/18  < 50% stenosis, and R VA retrograde flow as noted in past.  Here for f/u.  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  No changes from prior ecg 2017.  Remains off balance, fall risk issues.  Uses walker.  Has chronic COTTER, unchanged.  Uses home O2 at night and helps.  Weight up 5 pounds over last year.  No exercise.  Has chronic neuropathy in feet, legs.   CAD seems stable.  She has chronic angina.  Seems controlled on medical tx.  Uses sl ntg prn when upset/angry, takes 1 - 2 sl ntg and helps resolve sxs.  Angina sxs have not worsened in severity and/or frequency since last visit.  HTN above goal, no associated sxs.   Lipids well controlled, on statin.  Uses inhalers for COPD.  No TIA/CVA sxs on current tx.        Current Outpatient Medications:     acetaminophen (TYLENOL) 325 MG tablet, Take 650 mg by mouth every 6 (six) hours as needed for Pain., Disp: , Rfl:     albuterol 90 mcg/actuation inhaler, Inhale 2 puffs into the lungs every 6 (six) hours., Disp: 1 Inhaler, Rfl: 12    albuterol-ipratropium (DUO-NEB) 2.5 mg-0.5 mg/3 mL nebulizer solution, Take 3 mLs by nebulization every 6 (six) hours., Disp: 120 vial, Rfl: 12    amlodipine (NORVASC) 2.5 MG tablet, Take 1 tablet (2.5 mg total) by mouth once  daily., Disp: 30 tablet, Rfl: 11    aspirin (ECOTRIN) 81 MG EC tablet, Take 81 mg by mouth once daily., Disp: , Rfl:     atenolol (TENORMIN) 25 MG tablet, Take 1 tablet (25 mg total) by mouth once daily., Disp: 90 tablet, Rfl: 3    benazepril (LOTENSIN) 20 MG tablet, Take 1 tablet (20 mg total) by mouth 2 (two) times daily., Disp: 180 tablet, Rfl: 3    calcium carbonate (OS-RICHY) 600 mg (1,500 mg) Tab, Take 600 mg by mouth once daily. , Disp: , Rfl:     clopidogrel (PLAVIX) 75 mg tablet, Take 1 tablet (75 mg total) by mouth once daily., Disp: 90 tablet, Rfl: 3    fluticasone (FLONASE) 50 mcg/actuation nasal spray, 2 sprays (100 mcg total) by Each Nare route once daily., Disp: 3 Bottle, Rfl: 4    fluticasone-vilanterol (BREO ELLIPTA) 200-25 mcg/dose DsDv diskus inhaler, Inhale 1 puff into the lungs once daily. Controller, Disp: 1 each, Rfl: 11    gabapentin (NEURONTIN) 300 MG capsule, TAKE TWO CAPSULES BY MOUTH IN THE MORNING, ONE CAPSULE AT NOON, AND TWO CAPSULES AT NIGHT, Disp: 150 capsule, Rfl: 3    hydrALAZINE (APRESOLINE) 10 MG tablet, Take 1 tablet (10 mg total) by mouth 3 (three) times daily., Disp: 90 tablet, Rfl: 11    HYDROcodone-acetaminophen (NORCO)  mg per tablet, Take 1 tablet by mouth every 12 (twelve) hours as needed for Pain., Disp: 60 tablet, Rfl: 0    ipratropium (ATROVENT) 42 mcg (0.06 %) nasal spray, 2 sprays by Nasal route 4 (four) times daily. As needed for rhinitis, Disp: 15 mL, Rfl: 11    isosorbide mononitrate (IMDUR) 60 MG 24 hr tablet, Take 60 mg by mouth once daily., Disp: , Rfl:     levocetirizine (XYZAL) 5 MG tablet, TAKE ONE TABLET BY MOUTH IN THE EVENING, Disp: 30 tablet, Rfl: 11    melatonin 10 mg Tab, Take 1 tablet by mouth every evening., Disp: , Rfl:     milk thistle 175 mg tablet, Take 175 mg by mouth once daily., Disp: , Rfl:     nitroGLYCERIN (NITROSTAT) 0.4 MG SL tablet, Place 1 tablet (0.4 mg total) under the tongue every 5 (five) minutes as needed for  "Chest pain., Disp: 60 tablet, Rfl: 12    predniSONE (DELTASONE) 20 MG tablet, Prednisone 60 mg/ day for 3 days, 40 mg/day for 3 days,20 mg/ day for 3 days, (1/2 tablet )10 mg a day for 3 days., Disp: 20 tablet, Rfl: 1    pregabalin (LYRICA) 50 MG capsule, Take 1 capsule (50 mg total) by mouth 2 (two) times daily., Disp: 120 capsule, Rfl: 0    rosuvastatin (CRESTOR) 20 MG tablet, Take 1 tablet (20 mg total) by mouth every evening., Disp: 90 tablet, Rfl: 3    sertraline (ZOLOFT) 100 MG tablet, Take 100 mg by mouth once daily., Disp: , Rfl:     sertraline (ZOLOFT) 50 MG tablet, Take 1 tablet (50 mg total) by mouth once daily., Disp: 90 tablet, Rfl: 2    UNABLE TO FIND, 1 tablet once daily. MULTIVIT-iron-FA-calcium-mins tab 9mg-iron-400mcg, Disp: , Rfl:     nebulizer accessories Kit, Use with nebulizer, Disp: 1 kit, Rfl: prn        Review of Systems   Constitution: Negative.   HENT: Negative.    Eyes: Negative.    Cardiovascular: Positive for chest pain, dyspnea on exertion and leg swelling.   Respiratory: Positive for shortness of breath.    Endocrine: Negative.    Hematologic/Lymphatic: Negative.    Skin: Negative.    Musculoskeletal: Positive for arthritis, joint pain and neck pain.   Gastrointestinal: Negative.    Genitourinary: Negative.    Neurological: Positive for headaches, loss of balance and numbness.   Psychiatric/Behavioral: Negative.    Allergic/Immunologic: Negative.        BP (!) 160/84 (BP Location: Right arm, Patient Position: Sitting, BP Method: Large (Manual))   Pulse (!) 59   Ht 5' 4" (1.626 m)   Wt 104.7 kg (230 lb 13.2 oz)   BMI 39.62 kg/m²     Wt Readings from Last 3 Encounters:   01/22/19 104.7 kg (230 lb 13.2 oz)   11/28/18 106.7 kg (235 lb 3.7 oz)   08/23/18 105.3 kg (232 lb 1.6 oz)     Temp Readings from Last 3 Encounters:   11/28/18 98.1 °F (36.7 °C) (Tympanic)   08/20/18 97.4 °F (36.3 °C) (Tympanic)   01/15/18 96.6 °F (35.9 °C) (Tympanic)     BP Readings from Last 3 Encounters: "   01/22/19 (!) 160/84   08/23/18 134/80   08/20/18 130/89     Pulse Readings from Last 3 Encounters:   01/22/19 (!) 59   11/28/18 64   08/23/18 (!) 54          Objective:    Physical Exam   Constitutional: She is oriented to person, place, and time. Vital signs are normal. She appears well-developed and well-nourished. She is active and cooperative. She does not have a sickly appearance. She does not appear ill. No distress.   HENT:   Head: Normocephalic.   Neck: Neck supple. Normal carotid pulses, no hepatojugular reflux and no JVD present. Carotid bruit is not present. No thyromegaly present.   Cardiovascular: Normal rate, regular rhythm, S1 normal, S2 normal, normal heart sounds and normal pulses. PMI is not displaced. Exam reveals no gallop and no friction rub.   No murmur heard.  Pulses:       Radial pulses are 2+ on the right side, and 2+ on the left side.   Pulmonary/Chest: Effort normal and breath sounds normal. She has no wheezes. She has no rales.   Abdominal: Soft. Normal appearance, normal aorta and bowel sounds are normal. She exhibits no pulsatile liver, no abdominal bruit, no ascites and no mass. There is no splenomegaly or hepatomegaly. There is no tenderness.   obese   Musculoskeletal: She exhibits edema.   Lymphadenopathy:     She has no cervical adenopathy.   Neurological: She is alert and oriented to person, place, and time.   Skin: Skin is warm. She is not diaphoretic.   Psychiatric: She has a normal mood and affect. Her behavior is normal.   Nursing note and vitals reviewed.      I have reviewed all pertinent labs and cardiac studies.        Chemistry        Component Value Date/Time     07/18/2018 0816    K 4.3 07/18/2018 0816     07/18/2018 0816    CO2 30 (H) 07/18/2018 0816    BUN 20 07/18/2018 0816    CREATININE 0.8 07/18/2018 0816    GLU 98 07/18/2018 0816        Component Value Date/Time    CALCIUM 9.6 07/18/2018 0816    ALKPHOS 58 07/18/2018 0816    AST 19 07/18/2018 0816     ALT 16 07/18/2018 0816    BILITOT 0.7 07/18/2018 0816    ESTGFRAFRICA >60.0 07/18/2018 0816    EGFRNONAA >60.0 07/18/2018 0816        Lab Results   Component Value Date    WBC 9.16 08/20/2018    HGB 12.7 08/20/2018    HCT 41.1 08/20/2018    MCV 93 08/20/2018     08/20/2018     Lab Results   Component Value Date    HGBA1C 5.8 (H) 08/20/2018     Lab Results   Component Value Date    CHOL 134 07/18/2018    CHOL 192 01/16/2018    CHOL 168 07/19/2016     Lab Results   Component Value Date    HDL 61 07/18/2018    HDL 61 01/16/2018    HDL 54 07/19/2016     Lab Results   Component Value Date    LDLCALC 51.8 (L) 07/18/2018    LDLCALC 108.0 01/16/2018    LDLCALC 92.0 07/19/2016     Lab Results   Component Value Date    TRIG 106 07/18/2018    TRIG 115 01/16/2018    TRIG 110 07/19/2016     Lab Results   Component Value Date    CHOLHDL 45.5 07/18/2018    CHOLHDL 31.8 01/16/2018    CHOLHDL 32.1 07/19/2016           Assessment:       1. Chronic stable angina    2. Asymptomatic stenosis of both carotid arteries without infarction    3. CAD in native artery    4. Controlled type 2 diabetes mellitus without complication, without long-term current use of insulin    5. History of PTCA    6. Diastolic dysfunction    7. Mixed hyperlipidemia    8. Stenosis of right subclavian artery    9. Chronic obstructive pulmonary disease, unspecified COPD type    10. Essential hypertension    11. Edema of both legs    12. Class 2 severe obesity due to excess calories with serious comorbidity and body mass index (BMI) of 39.0 to 39.9 in adult         Plan:             Sub-optimal control of HTN.  She is taking her Hydralazine only once daily instead of tid as prescribed.  She will take tid and this should help HTN control.  Stable CAD/chronic angina.  Continue OMT for CAD.  Sl ntg prn for angina as in past.  Keep DM well controlled.  Continue statin for lipids/CAD, PAD.  Elevate legs, low salt diet, stockings prn.    Continue inhalers and f/u  with Dr. Patel, Pulmonary, for COPD.  Weight loss  Exercise as tolerated  She was advised to see Neurology last time I saw her for chronic headaches and gait instability but didn't.  If sxs worsen, she is again advised to see her PCP and/or Neurology.   F/u 6 months.

## 2019-01-24 DIAGNOSIS — I10 ESSENTIAL HYPERTENSION: Primary | ICD-10-CM

## 2019-01-24 RX ORDER — HYDRALAZINE HYDROCHLORIDE 10 MG/1
10 TABLET, FILM COATED ORAL 3 TIMES DAILY
Qty: 90 TABLET | Refills: 11 | Status: SHIPPED | OUTPATIENT
Start: 2019-01-24 | End: 2020-06-29

## 2019-02-01 ENCOUNTER — OFFICE VISIT (OUTPATIENT)
Dept: OPHTHALMOLOGY | Facility: CLINIC | Age: 84
End: 2019-02-01
Payer: MEDICARE

## 2019-02-01 DIAGNOSIS — E11.9 TYPE 2 DIABETES MELLITUS WITHOUT RETINOPATHY: Primary | ICD-10-CM

## 2019-02-01 DIAGNOSIS — Z96.1 PSEUDOPHAKIA OF BOTH EYES: ICD-10-CM

## 2019-02-01 DIAGNOSIS — H52.223 REGULAR ASTIGMATISM OF BOTH EYES: ICD-10-CM

## 2019-02-01 PROCEDURE — 92015 DETERMINE REFRACTIVE STATE: CPT | Mod: S$GLB,,, | Performed by: OPTOMETRIST

## 2019-02-01 PROCEDURE — 99999 PR PBB SHADOW E&M-EST. PATIENT-LVL I: ICD-10-PCS | Mod: PBBFAC,,, | Performed by: OPTOMETRIST

## 2019-02-01 PROCEDURE — 92015 PR REFRACTION: ICD-10-PCS | Mod: S$GLB,,, | Performed by: OPTOMETRIST

## 2019-02-01 PROCEDURE — 92004 COMPRE OPH EXAM NEW PT 1/>: CPT | Mod: S$GLB,,, | Performed by: OPTOMETRIST

## 2019-02-01 PROCEDURE — 99999 PR PBB SHADOW E&M-EST. PATIENT-LVL I: CPT | Mod: PBBFAC,,, | Performed by: OPTOMETRIST

## 2019-02-01 PROCEDURE — 92004 PR EYE EXAM, NEW PATIENT,COMPREHESV: ICD-10-PCS | Mod: S$GLB,,, | Performed by: OPTOMETRIST

## 2019-02-01 PROCEDURE — 99499 UNLISTED E&M SERVICE: CPT | Mod: S$GLB,,, | Performed by: OPTOMETRIST

## 2019-02-01 PROCEDURE — 99499 RISK ADDL DX/OHS AUDIT: ICD-10-PCS | Mod: S$GLB,,, | Performed by: OPTOMETRIST

## 2019-02-01 NOTE — PROGRESS NOTES
HPI     Pts last exam was 6/17/14 with DNL. Pt c/o overall blurred va and wears   otc readers prn.   PCIOL OU  Diabetic eye exam  Diagnosed with diabetes in 2003  Recent vision fluctuations no  Blood sugar: 5.8  HPI    Any vision changes since last exam: decreased overall va  Eye pain: no  Other ocular symptoms: occasional dryness    Do you wear currently wear glasses or contacts? gls    Interested in contacts today? no    Do you plan on getting new glasses today? If needed          Last edited by Traci Ly MA on 2/1/2019  2:26 PM. (History)            Assessment /Plan     For exam results, see Encounter Report.    Type 2 diabetes mellitus without retinopathy  No diabetic retinopathy OD, OS  Continue close care with PCP  Monitor 12 months    Pseudophakia of both eyes  Stable OU  Monitor 12 months    Regular astigmatism of both eyes  Eyeglass Final Rx     Eyeglass Final Rx       Sphere Cylinder Axis Add    Right -1.00 +1.25 180 +2.50    Left -1.00 +1.25 180 +2.50    Expiration Date:  2/2/2020                RTC 1 yr for dilated eye exam or PRN if any problems.   Discussed above and answered questions.

## 2019-02-08 ENCOUNTER — PATIENT OUTREACH (OUTPATIENT)
Dept: ADMINISTRATIVE | Facility: HOSPITAL | Age: 84
End: 2019-02-08

## 2019-02-25 ENCOUNTER — OFFICE VISIT (OUTPATIENT)
Dept: PULMONOLOGY | Facility: CLINIC | Age: 84
End: 2019-02-25
Payer: MEDICARE

## 2019-02-25 ENCOUNTER — CLINICAL SUPPORT (OUTPATIENT)
Dept: PULMONOLOGY | Facility: CLINIC | Age: 84
End: 2019-02-25
Payer: MEDICARE

## 2019-02-25 VITALS
WEIGHT: 229.06 LBS | RESPIRATION RATE: 20 BRPM | DIASTOLIC BLOOD PRESSURE: 83 MMHG | HEART RATE: 64 BPM | HEIGHT: 64 IN | SYSTOLIC BLOOD PRESSURE: 155 MMHG | BODY MASS INDEX: 39.11 KG/M2 | HEIGHT: 64 IN | WEIGHT: 229.06 LBS | BODY MASS INDEX: 39.11 KG/M2 | OXYGEN SATURATION: 97 %

## 2019-02-25 DIAGNOSIS — J30.0 CHRONIC VASOMOTOR RHINITIS: ICD-10-CM

## 2019-02-25 DIAGNOSIS — J30.0 VASOMOTOR RHINITIS: ICD-10-CM

## 2019-02-25 DIAGNOSIS — J44.9 CHRONIC OBSTRUCTIVE PULMONARY DISEASE, UNSPECIFIED COPD TYPE: ICD-10-CM

## 2019-02-25 DIAGNOSIS — R09.02 EXERCISE HYPOXEMIA: Primary | ICD-10-CM

## 2019-02-25 DIAGNOSIS — K44.9 DIAPHRAGMATIC HERNIA WITHOUT OBSTRUCTION AND WITHOUT GANGRENE: Chronic | ICD-10-CM

## 2019-02-25 DIAGNOSIS — R91.1 LUNG NODULE: ICD-10-CM

## 2019-02-25 PROCEDURE — 99999 PR PBB SHADOW E&M-EST. PATIENT-LVL V: CPT | Mod: PBBFAC,,, | Performed by: INTERNAL MEDICINE

## 2019-02-25 PROCEDURE — 3079F DIAST BP 80-89 MM HG: CPT | Mod: CPTII,S$GLB,, | Performed by: INTERNAL MEDICINE

## 2019-02-25 PROCEDURE — 99999 PR PBB SHADOW E&M-EST. PATIENT-LVL V: ICD-10-PCS | Mod: PBBFAC,,, | Performed by: INTERNAL MEDICINE

## 2019-02-25 PROCEDURE — 3077F PR MOST RECENT SYSTOLIC BLOOD PRESSURE >= 140 MM HG: ICD-10-PCS | Mod: CPTII,S$GLB,, | Performed by: INTERNAL MEDICINE

## 2019-02-25 PROCEDURE — 99499 RISK ADDL DX/OHS AUDIT: ICD-10-PCS | Mod: S$GLB,,, | Performed by: INTERNAL MEDICINE

## 2019-02-25 PROCEDURE — 3079F PR MOST RECENT DIASTOLIC BLOOD PRESSURE 80-89 MM HG: ICD-10-PCS | Mod: CPTII,S$GLB,, | Performed by: INTERNAL MEDICINE

## 2019-02-25 PROCEDURE — 3077F SYST BP >= 140 MM HG: CPT | Mod: CPTII,S$GLB,, | Performed by: INTERNAL MEDICINE

## 2019-02-25 PROCEDURE — 99499 UNLISTED E&M SERVICE: CPT | Mod: S$GLB,,, | Performed by: INTERNAL MEDICINE

## 2019-02-25 PROCEDURE — 94618 PULMONARY STRESS TESTING: ICD-10-PCS | Mod: S$GLB,,, | Performed by: INTERNAL MEDICINE

## 2019-02-25 PROCEDURE — 94618 PULMONARY STRESS TESTING: CPT | Mod: S$GLB,,, | Performed by: INTERNAL MEDICINE

## 2019-02-25 PROCEDURE — 99215 OFFICE O/P EST HI 40 MIN: CPT | Mod: 25,S$GLB,, | Performed by: INTERNAL MEDICINE

## 2019-02-25 PROCEDURE — 1101F PR PT FALLS ASSESS DOC 0-1 FALLS W/OUT INJ PAST YR: ICD-10-PCS | Mod: CPTII,S$GLB,, | Performed by: INTERNAL MEDICINE

## 2019-02-25 PROCEDURE — 1101F PT FALLS ASSESS-DOCD LE1/YR: CPT | Mod: CPTII,S$GLB,, | Performed by: INTERNAL MEDICINE

## 2019-02-25 PROCEDURE — 99215 PR OFFICE/OUTPT VISIT, EST, LEVL V, 40-54 MIN: ICD-10-PCS | Mod: 25,S$GLB,, | Performed by: INTERNAL MEDICINE

## 2019-02-25 RX ORDER — IPRATROPIUM BROMIDE 42 UG/1
2 SPRAY, METERED NASAL 4 TIMES DAILY
Qty: 15 ML | Refills: 11 | Status: SHIPPED | OUTPATIENT
Start: 2019-02-25 | End: 2019-09-30 | Stop reason: SDUPTHER

## 2019-02-25 NOTE — PATIENT INSTRUCTIONS
Ipratropium solution for inhalation  What is this medicine?  IPRATROPIUM (nellie peñaloza miah) is a bronchodilator. It helps open up the airways in your lungs to make it easier to breathe. This medicine is used to treat chronic obstructive pulmonary disease (COPD), including emphysema and chronic bronchitis. Do not use this medicine alone for an acute attack.  How should I use this medicine?  This medicine is used in a nebulizer. Nebulizers make a liquid into an aerosol that you breathe in through your mouth or your mouth and nose into your lungs. You will be taught how to use your nebulizer. Follow the directions on your prescription label. Do not use more often than directed. Do not stop taking except on your doctor's advice.  Talk to your pediatrician regarding the use of this medicine in children. Special care may be needed.  What side effects may I notice from receiving this medicine?  Side effects that you should report to your doctor or health care professional as soon as possible:  · allergic reactions like skin rash, itching or hives, swelling of the face, lips, or tongue  · difficulty breathing or wheezing that increases or does not go away  · dizziness  · eye pain  · fast or irregular heartbeat  · infection or fever  Side effects that usually do not require medical attention (report to your doctor or health care professional if they continue or are bothersome):  · blurred vision  · cough  · dry mouth  · headache  · nausea or constipation  · trouble passing urine  What may interact with this medicine?  Ask your health care professional before you mix any medicines in the same dose of your nebulizer.  What if I miss a dose?  If you miss a dose, take it as soon as you can. If it is almost time for your next dose, take only that dose. Do not take double or extra doses.  Where should I keep my medicine?  Keep out of the reach of children.  Store at room temperature between 15 and 30 degrees C (59 and 86  degrees F). Protect from light. Keep the vials in the carton until you are ready to use. Throw away any unused medicine after the expiration date.  What should I tell my health care provider before I take this medicine?  They need to know if you have any of the following conditions:  · bladder problems or difficulty passing urine  · glaucoma  · heart disease or irregular heartbeat  · prostate trouble  · an unusual or allergic reaction to ipratropium, atropine, bromides, soya protein, peanut oil, soybeans or peanuts, other medicines, foods, dyes, or preservatives  · pregnant or trying to get pregnant  · breast-feeding  What should I watch for while using this medicine?  Visit your doctor for regular checks on your progress. Tell your doctor or health care professional if your symptoms do not improve. Do not use extra medicine. If your breathing gets worse or if you need short acting inhalers more often, call your doctor right away.  NOTE:This sheet is a summary. It may not cover all possible information. If you have questions about this medicine, talk to your doctor, pharmacist, or health care provider. Copyright© 2017 [employer]        Ipratropium nasal spray  What is this medicine?  IPRATROPIUM (i pra LORIE peñaloza um) is used to relieve a runny nose due to seasonal allergies or non allergic causes, like a cold. This medicine does not help with nasal congestion or sneezing.  How should I use this medicine?  This medicine is for use only in the nose. Follow the directions on the prescription label. Do not use more often than directed. Do not share this medicine with anyone else. Make sure that you are using your nasal spray correctly. Ask you doctor or health care provider if you have any questions.  Talk to your pediatrician regarding the use of this medicine in children. While this drug may be prescribed for children as young as 6 years of age for selected conditions, precautions do apply.  What side effects may I  notice from receiving this medicine?  Side effects that you should report to your doctor or health care professional as soon as possible:  · allergic reactions like skin rash, itching or hives, swelling of the face, lips, or tongue or difficulty breathing  · chest pain or fast heartbeat  · dizziness or fainting spell  · eye pain or change in vision  · infection  Side effects that usually do not require medical attention (report to your doctor or health care professional if they continue or are bothersome):  · dry eyes, mouth or nose  · irritation in the nose or throat  · nausea  · nosebleeds  · trouble passing urine  · unusual taste  What may interact with this medicine?  · atropine, hyoscyamine, and related medications  · medicines for motion sickness or dizziness  · medicines for overactive bladder  · some medicines for colds  · some medicines for stomach problems  What if I miss a dose?  If you miss a dose, use it as soon as you can. If it is almost time for your next dose, use only that dose. Do not use double or extra doses.  Where should I keep my medicine?  Keep out of the reach of children.  Store at room temperature between 15 and 30 degrees C (59 and 86 degrees F). Avoid excessive humidity. Do not freeze. Throw away any unused medicine after the expiration date.  What should I tell my health care provider before I take this medicine?  They need to know if you have any of these conditions:  · bladder problems, difficulty passing urine  · glaucoma  · prostate trouble  · an unusual or allergic reaction to ipratropium, atropine, bromides, soya flour or protein, soybeans or peanuts, peanut oil, other medicines, foods, dyes, or preservatives  · pregnant or trying to get pregnant  · breast-feeding  What should I watch for while using this medicine?  Tell your doctor or health care professional if your symptoms do not improve. Do not use extra medicine. This medicine should start to work within a day or two of  treatment.  Do not get this spray in your eyes. It can cause irritation, pain, or blurred vision. If you do get any in your eyes, rinse out with plenty of cool tap water and call your health care provider.  NOTE:This sheet is a summary. It may not cover all possible information. If you have questions about this medicine, talk to your doctor, pharmacist, or health care provider. Copyright© 2017 [employer]

## 2019-02-25 NOTE — PROCEDURES
"[facility] - [facility]  Six Minute Walk     SUMMARY     Ordering Provider: Dr Patel   Interpreting Provider: Dr Patel  Performing nurse/tech/RT: GRANT Zelaya RRT  Diagnosis: COPD  Height: 5' 4" (162.6 cm)  Weight: 103.9 kg (229 lb 0.9 oz)  BMI (Calculated): 39.4   Patient Race:             Phase Oxygen Assessment Supplemental O2 Heart   Rate Blood Pressure Tat[place]na Dyspnea Scale Rating   Resting 91 % Room Air 64 bpm 154/90 4   Exercise        Minute        1 84 % Room Air 75 bpm     2 88 % 2 L/M 78 bpm     3 91 % 3 L/M 80 bpm     4 91 % 3 L/M 80 bpm     5 91 % 3 L/M 80 bpm     6  92 % 3 L/M 80 bpm (!) 204/75 5-6   Recovery        Minute        1 95 % 3 L/M 65 bpm     2 96 % 3 L/M 61 bpm     3 96 % 3 L/M 57 bpm     4 97 % 3 L/M 56 bpm 155/83 4     Six Minute Walk Summary  6MWT Status: completed without stopping  Patient Reported: Dyspnea, Dizziness(pain in feet)     Interpretation:  Did the patient stop or pause?: No                                         Total Time Walked (Calculated): 360 seconds  Final Partial Lap Distance (feet): 100 feet  Total Distance Meters (Calculated): 152.4 meters  Predicted Distance Meters (Calculated): 303.98 meters  Percentage of Predicted (Calculated): 50.13  Peak VO2 (Calculated): 8.55  Mets: 2.44  Has The Patient Had a Previous Six Minute Walk Test?: No       Previous 6MWT Results  Has The Patient Had a Previous Six Minute Walk Test?: No     Interpretation:  Total distance walked in six minutes is moderately reduced indicating a reduction in overall  functional capacity. There was  significant oxygen desaturation to 84%. Clinical correlation suggested.    Dale Patel MD    Mild exercise-induced hypoxemia described as an arterial oxygen saturation of 93-95% (or 3-4% less than at rest), moderate exercise-induced hypoxemia as 89-93%, and severe exercise induced hypoxemia as < 89% O2 saturation.  Medicare Criteria Comments:   When arterial oxygen saturation is at or below " 88% during exercise (severe exercise induced hypoxemia) then the patient falls under Medicare Group 1 criteria for supplemental oxygen.  Details about Medicare Group Criteria coverage can be found at http://www.cms.hhs.gov/manuals/downloads/

## 2019-02-25 NOTE — PROGRESS NOTES
Subjective:       Patient ID: Maldonado Deleon is a 83 y.o. female.    Chief Complaint: COPD    HPI COPD  She presents for evaluation and treatment of COPD. The patient is not currently have symptoms / an exacerbation. The patient has COPD for approximately many years. Symptoms in previous episodes have included dyspnea, cough and wheezing, and typically last 1 month. Previous episodes have been exacerbated by any exercise. Current treatment includes albuterol nebulizer, which generally provides some relief of symptoms.   She uses 2 pillows at night. Patient currently is on oxygen at 2 L/min per nasal cannula.. The patient is having no constitutional symptoms, denying fever, chills, anorexia, or weight loss. The patient has been hospitalized for this condition before. She quit smoking approximately many years ago. The patient is experiencing exercise intolerance (difficulty walking 1 blocks on flat ground).    Medical Necessity of Nebulizer Treatment:  The use of a nebulizer is explicitly recommended on a daily basis for treatment of Chronic Obstructive Pulmonary Disease. Use of the nebulizer is medically necessary for mobilization of secretions for relief of pulmonary symptoms of coughing and airway obstruction. Additionally the use of nebulizer is medically necessary for administration of bronchodilator medications and in some cases inhaled steroids and inhaled antibiotics. The use of nebulizer is recommended at least twice a day and may be used as often as every 4- 6 hours depending on the clinical condition.      Past Medical History:   Diagnosis Date    Anticoagulant long-term use     Plavix    Atypical chest pain 8/26/2013    Back pain     CAD (coronary artery disease)     CAD in native artery 10/5/2015    Carotid artery disease without cerebral infarction 8/26/2013    Colon cancer     resection and chemo.    COPD (chronic obstructive pulmonary disease)     Depression     Diabetes mellitus type II      Diaphragmatic hernia without obstruction and without gangrene 8/13/2015    Emphysema of lung     Encounter for blood transfusion     Gallstone pancreatitis     History of PTCA 10/5/2015    Hyperlipidemia     Hypertension     Lymphocytic colitis     Meningioma     Neuropathy 8/2/2017    OA (osteoarthritis)     Obesity 8/26/2013    Oxygen dependent     q hs and prn     Past Surgical History:   Procedure Laterality Date    ABDOMINAL SURGERY      ARTHROPLASTY-HIP (ANTERIOR APPROACH) Left 3/29/2017    Per[facility] by [facility] Sr[facility] at [facility] OR    BACK SURGERY      x 2    BIOPSY-LUNG N/A 11/25/2015    Performed by [facility] OR 2ND F[facility]    CAPSULOTOMY-JOINT Left 3/29/2017    Performed by [facility] Sr[facility] at [facility] OR    CATARACT EXTRACTION      CHOLECYSTECTOMY      COLECTOMY      COLONOSCOPY N/A 8/8/2014    Performed by Mook Strauss MD at [facility] ENDO    CORONARY ANGIOPLASTY      ESOPHAGOGASTRODUODENOSCOPY (EGD) N/A 8/13/2015    Performed by Mook Strauss MD at [facility] ENDO    EYE SURGERY      JOINT REPLACEMENT Right     hip replacement x 4    PARATHYROIDECTOMY      REMOVAL-HARDWARE-HIP Left 3/29/2017    Performed by [facility] Sr[facility] at [facility] OR    SHOULDER SURGERY Right     SYNOVECTOMY-HIP Left 3/29/2017    Performed by [facility] Sr[facility] at [facility] OR    TOTAL HIP ARTHROPLASTY Left     TOTAL KNEE ARTHROPLASTY Bilateral      Social History     Socioeconomic History    Marital status:      Spouse name: Not on file    Number of children: Not on file    Years of education: Not on file    Highest education level: Not on file   Social Needs    Financial resource strain: Not on file    Food insecurity - worry: Not on file    Food insecurity - inability: Not on file    Transportation needs - medical: Not on file    Transportation needs - non-medical: Not on file   Occupational History    Not on file   Tobacco Use    Smoking status: Former Smoker      Packs/day: 2.50     Years: 50.00     Pack years: 125.00     Types: Cigarettes     Last attempt to quit: 1987     Years since quittin.5    Smokeless tobacco: Never Used   Substance and Sexual Activity    Alcohol use: No     Alcohol/week: 0.0 oz    Drug use: No    Sexual activity: No   Other Topics Concern    Not on file   Social History Narrative    Not on file     Review of Systems   Constitutional: Positive for fatigue. Negative for fever.   HENT: Positive for postnasal drip, rhinorrhea and congestion.    Eyes: Negative for redness and itching.   Respiratory: Positive for cough, sputum production, shortness of breath, dyspnea on extertion, use of rescue inhaler and Paroxysmal Nocturnal Dyspnea.    Cardiovascular: Negative for chest pain, palpitations and leg swelling.   Genitourinary: Negative for difficulty urinating and hematuria.   Endocrine: Negative for cold intolerance and heat intolerance.    Skin: Negative for rash.   Gastrointestinal: Negative for nausea and abdominal pain.   Neurological: Negative for dizziness, syncope, weakness and light-headedness.   Hematological: Negative for adenopathy. Does not bruise/bleed easily.   Psychiatric/Behavioral: Negative for sleep disturbance. The patient is not nervous/anxious.        Objective:      Physical Exam   Constitutional: She is oriented to person, place, and time. She appears well-developed and well-nourished.   HENT:   Head: Normocephalic and atraumatic.   Mouth/Throat: Oropharyngeal exudate present.   Eyes: Conjunctivae are normal. Pupils are equal, round, and reactive to light.   Neck: Neck supple. No JVD present. No tracheal deviation present. No thyromegaly present.   Cardiovascular: Normal rate, regular rhythm and normal heart sounds.   Pulmonary/Chest: Effort normal. No respiratory distress. She has decreased breath sounds. She has wheezes in the right lower field and the left lower field. She has no rhonchi. She has no rales. She  exhibits no tenderness.   Abdominal: Soft. Bowel sounds are normal.   Musculoskeletal: Normal range of motion. She exhibits no edema.   Lymphadenopathy:     She has no cervical adenopathy.   Neurological: She is alert and oriented to person, place, and time.   Skin: Skin is warm and dry.   Nursing note and vitals reviewed.    Personal Diagnostic Review  Chest X-Ray: I personally reviewed the films and findings are:, air trapping/emphysema, intersitial prominence  Pulmonary function tests:  No recent  Oxygen Qualification Test  Conditions of testing: Stable outpatient  Oxygen saturation at rest on room air:  91 %    Oxygen saturation with exercise on room air: 84%%  Oxygen saturation with exercise on   3 liters oxygen  is  91 %.    Dale Patel MD  Pulmonary / Critical Care Medicine          Pulmonary Studies Review 2/25/2019 2/25/2019 1/22/2019 11/28/2018 8/23/2018 8/20/2018 8/2/2018   SpO2 97 - - 90 93 - -   Ordering Provider - Dr Patel - - - - -   Interpreting Provider - Dr Patel - - - - -   Performing nurse/tech/RT - D Zelaya RRT - - - - -   Diagnosis - COPD - - - - -   Height 64.000 64 64.000 64.000 64.200 64.000 64.000   Weight 3664.93 3664.93 3693.15 3763.69 3713.6 3679.04 3664.93   BMI (Calculated) 39.4 39.4 39.7 40.5 39.7 39.6 39.4   Predicted Distance 159.91 159.91 158.04 153.05 158.04 158.67 159.91   Patient Race -  - - - - -   6MWT Status - completed without stopping - - - - -   Patient Reported - Dyspnea;Dizziness - - - - -   Was O2 used? - Yes - - - - -   Delivery Method - Cannula - - - - -   Did patient stop? - No - - - - -   Type of assistive device(s) used? - a walker - - - - -   Is extra documentation required for this patient? - Yes - - - - -   Oxygen Saturation - 91 - - - - -   Supplemental Oxygen - Room Air - - - - -   Heart Rate - 64 - - - - -   Blood Pressure - 154/90 - - - - -   [place] Dyspnea Rating  - somewhat heavy - - - - -   Oxygen Saturation - 92 - - - - -   Supplemental Oxygen -  3 L/M - - - - -   Heart Rate - 80 - - - - -   Blood Pressure - 204/75 - - - - -   [place] Dyspnea Rating  - heavy - - - - -   Recovery Time (seconds) - 240 - - - - -   Oxygen Saturation - 97 - - - - -   Supplemental Oxygen - 3 L/M - - - - -   Heart Rate - 56 - - - - -   Blood Pressure - 155/83 - - - - -   [place] Dyspnea Rating  - somewhat heavy - - - - -   Is procedure ready for interpretation? - Yes - - - - -   Did the patient stop or pause? - No - - - - -   Total Time Walked (Calculated) - 360 - - - - -   Total Laps Walked - 2 - - - - -   Final Partial Lap Distance (feet) - 100 - - - - -   Total Distance Feet (Calculated) - 500 - - - - -   Total Distance Meters (Calculated) - 152.4 - - - - -   Predicted Distance Meters (Calculated) 303.98 303.98 302.14 297.56 301.87 303.06 303.98   Percentage of Predicted (Calculated) - 50.13 - - - - -   Peak VO2 (Calculated) - 8.55 - - - - -   Mets - 2.44 - - - - -   Has The Patient Had a Previous Six Minute Walk Test? - No - - - - -   Oxygen Qualification? - Yes - - - - -   Oxygen Saturation - 91 - - - - -   Supplemental Oxygen - Room Air - - - - -   Heart Rate - 64 - - - - -   Blood Pressure - 154/90 - - - - -   [place] Dyspnea Rating  - somewhat heavy - - - - -   Oxygen Saturation - 84 - - - - -   Supplemental Oxygen - Room Air - - - - -   Heart Rate - 80 - - - - -   Blood Pressure - 204/75 - - - - -   [place] Dyspnea Rating  - heavy - - - - -   Recovery Time (seconds) - 240 - - - - -   Oxygen Saturation - 97 - - - - -   Supplemental Oxygen - 3 L/M - - - - -   Heart Rate - 56 - - - - -   Blood Pressure - 155/83 - - - - -   [place] Dyspnea Rating  - somewhat heavy - - - - -     No flowsheet data found.      Assessment:       1. Exercise hypoxemia    2. Chronic obstructive pulmonary disease, unspecified COPD type    3. Diaphragmatic hernia without obstruction and without gangrene    4. Lung nodule - Hamartoma    5. Vasomotor rhinitis    6. Chronic vasomotor rhinitis        Outpatient  Encounter Medications as of 2/25/2019   Medication Sig Dispense Refill    acetaminophen (TYLENOL) 325 MG tablet Take 650 mg by mouth every 6 (six) hours as needed for Pain.      albuterol 90 mcg/actuation inhaler Inhale 2 puffs into the lungs every 6 (six) hours. 1 Inhaler 12    albuterol-ipratropium (DUO-NEB) 2.5 mg-0.5 mg/3 mL nebulizer solution Take 3 mLs by nebulization every 6 (six) hours. 120 vial 12    aspirin (ECOTRIN) 81 MG EC tablet Take 81 mg by mouth once daily.      atenolol (TENORMIN) 25 MG tablet Take 1 tablet (25 mg total) by mouth once daily. 90 tablet 3    benazepril (LOTENSIN) 20 MG tablet Take 1 tablet (20 mg total) by mouth 2 (two) times daily. 180 tablet 3    calcium carbonate (OS-RICHY) 600 mg (1,500 mg) Tab Take 600 mg by mouth once daily.       clopidogrel (PLAVIX) 75 mg tablet Take 1 tablet (75 mg total) by mouth once daily. 90 tablet 3    fluticasone (FLONASE) 50 mcg/actuation nasal spray 2 sprays (100 mcg total) by Each Nare route once daily. 3 Bottle 4    fluticasone-vilanterol (BREO ELLIPTA) 200-25 mcg/dose DsDv diskus inhaler Inhale 1 puff into the lungs once daily. Controller 1 each 11    gabapentin (NEURONTIN) 300 MG capsule TAKE TWO CAPSULES BY MOUTH IN THE MORNING, ONE CAPSULE AT NOON, AND TWO CAPSULES AT NIGHT 150 capsule 3    hydrALAZINE (APRESOLINE) 10 MG tablet Take 1 tablet (10 mg total) by mouth 3 (three) times daily. 90 tablet 11    HYDROcodone-acetaminophen (NORCO)  mg per tablet Take 1 tablet by mouth every 12 (twelve) hours as needed for Pain. 60 tablet 0    ipratropium (ATROVENT) 42 mcg (0.06 %) nasal spray 2 sprays by Nasal route 4 (four) times daily. As needed for rhinitis 15 mL 11    isosorbide mononitrate (IMDUR) 60 MG 24 hr tablet Take 60 mg by mouth once daily.      levocetirizine (XYZAL) 5 MG tablet TAKE ONE TABLET BY MOUTH IN THE EVENING 30 tablet 11    melatonin 10 mg Tab Take 1 tablet by mouth every evening.      milk thistle 175 mg tablet  "Take 175 mg by mouth once daily.      nebulizer accessories Kit Use with nebulizer 1 kit prn    nitroGLYCERIN (NITROSTAT) 0.4 MG SL tablet Place 1 tablet (0.4 mg total) under the tongue every 5 (five) minutes as needed for Chest pain. 60 tablet 12    pregabalin (LYRICA) 50 MG capsule Take 1 capsule (50 mg total) by mouth 2 (two) times daily. 120 capsule 0    rosuvastatin (CRESTOR) 20 MG tablet Take 1 tablet (20 mg total) by mouth every evening. 90 tablet 3    sertraline (ZOLOFT) 100 MG tablet Take 100 mg by mouth once daily.      sertraline (ZOLOFT) 50 MG tablet Take 1 tablet (50 mg total) by mouth once daily. 90 tablet 2    UNABLE TO FIND 1 tablet once daily. MULTIVIT-iron-FA-calcium-mins tab  9mg-iron-400mcg      [DISCONTINUED] ipratropium (ATROVENT) 42 mcg (0.06 %) nasal spray 2 sprays by Nasal route 4 (four) times daily. As needed for rhinitis 15 mL 11    amlodipine (NORVASC) 2.5 MG tablet Take 1 tablet (2.5 mg total) by mouth once daily. 30 tablet 11     No facility-administered encounter medications on file as of 2/25/2019.      Orders Placed This Encounter   Procedures    OXYGEN FOR HOME USE     Needs battery operated device(INOGEN or simiiliar) and supplies     Order Specific Question:   Liter Flow     Answer:   2     Order Specific Question:   Duration     Answer:   With activity     Order Specific Question:   Qualifying SpO2:     Answer:   84%     Order Specific Question:   Testing done at:     Answer:   Exercise/Activity     Order Specific Question:   Route     Answer:   nasal cannula     Order Specific Question:   Portable mode:     Answer:   pulse dose acceptable     Order Specific Question:   Device     Answer:   home concentrator with portable unit     Order Specific Question:   Length of need (in months):     Answer:   99 mos     Order Specific Question:   Patient condition with qualifying saturation     Answer:   COPD     Order Specific Question:   Height:     Answer:   5' 4" (1.626 m)     " "Order Specific Question:   Weight:     Answer:   103.9 kg (229 lb 0.9 oz)     Order Specific Question:   Does patient have medical equipment at home?     Answer:   nebulizer     Order Specific Question:   Does patient have medical equipment at home?     Answer:   portable oxygen     Order Specific Question:   Alternative treatment measures have been tried or considered and deemed clinically ineffective.     Answer:   Yes    NEBULIZER FOR HOME USE     [facility] for [facility]/O[facility]/Nebulizer supplies.  Customer Service: [phone]  Call: [phone]  Fax: [phone]  Billing Inquiries: [phone] or [phone]       Order Specific Question:   Height:     Answer:   5' 4" (1.626 m)     Order Specific Question:   Weight:     Answer:   103.9 kg (229 lb 0.9 oz)     Order Specific Question:   Does patient have medical equipment at home?     Answer:   nebulizer     Order Specific Question:   Does patient have medical equipment at home?     Answer:   portable oxygen     Order Specific Question:   Length of need (1-99 months):     Answer:   99    NEBULIZER KIT (SUPPLIES) FOR HOME USE     Order Specific Question:   Height:     Answer:   5' 4" (1.626 m)     Order Specific Question:   Weight:     Answer:   103.9 kg (229 lb 0.9 oz)     Order Specific Question:   Does patient have medical equipment at home?     Answer:   nebulizer     Order Specific Question:   Does patient have medical equipment at home?     Answer:   portable oxygen     Order Specific Question:   Length of need (1-99 months):     Answer:   99     Order Specific Question:   Mask or Mouthpiece?     Answer:   Mouthpiece    Complete PFT with bronchodilator     Standing Status:   Future     Standing Expiration Date:   2020     Plan:       Requested Prescriptions     Signed Prescriptions Disp Refills    ipratropium (ATROVENT) 42 mcg (0.06 %) nasal spray 15 mL 11     Si sprays by Nasal route 4 (four) times daily. As needed for rhinitis "     Exercise hypoxemia  -     OXYGEN FOR HOME USE  -     Complete PFT with bronchodilator; Future; Expected date: 02/25/2019    Chronic obstructive pulmonary disease, unspecified COPD type  -     OXYGEN FOR HOME USE  -     NEBULIZER FOR HOME USE  -     NEBULIZER KIT (SUPPLIES) FOR HOME USE  -     Complete PFT with bronchodilator; Future; Expected date: 02/25/2019    Diaphragmatic hernia without obstruction and without gangrene  -     Complete PFT with bronchodilator; Future; Expected date: 02/25/2019    Lung nodule - Hamartoma    Vasomotor rhinitis    Chronic vasomotor rhinitis  -     ipratropium (ATROVENT) 42 mcg (0.06 %) nasal spray; 2 sprays by Nasal route 4 (four) times daily. As needed for rhinitis  Dispense: 15 mL; Refill: 11      Follow-up in about 7 months (around 9/25/2019) for PFT on return.    MEDICAL DECISION MAKING: Moderate to high complexity.  Overall, the multiple problems listed are of moderate to high severity that may impact quality of life and activities of daily living. Side effects of medications, treatment plan as well as options and alternatives reviewed and discussed with patient. There was counseling of patient concerning these issues.    Total time spent in face to face counseling and coordination of care - 40 minutes over 50% of time was used in discussion of prognosis, risks, benefits of treatment, instructions and compliance with regimen . Discussion with other physicians or health care providers (homehealth, durable medical equipment providers).

## 2019-03-07 DIAGNOSIS — J44.9 CHRONIC OBSTRUCTIVE PULMONARY DISEASE, UNSPECIFIED COPD TYPE: ICD-10-CM

## 2019-03-07 RX ORDER — IPRATROPIUM BROMIDE AND ALBUTEROL SULFATE 2.5; .5 MG/3ML; MG/3ML
3 SOLUTION RESPIRATORY (INHALATION) EVERY 6 HOURS
Qty: 120 VIAL | Refills: 12 | Status: SHIPPED | OUTPATIENT
Start: 2019-03-07 | End: 2019-09-30 | Stop reason: SDUPTHER

## 2019-03-07 NOTE — TELEPHONE ENCOUNTER
----- Message from Rich Ochoa sent at 3/7/2019  1:59 PM CST -----  Contact: self  Type:  RX Refill Request    Who Called: pt  Refill or New Rx:refill  RX Name and Strength:nebulizer-breathing treatment  How is the patient currently taking it? (ex. 1XDay):every six hours  Is this a 30 day or 90 day:n/a  Preferred Pharmacy with phone number:  [address] [id number] [facility] [address]  Phone: [phone] Fax: [phone]  Local or Mail Order:local  Ordering Provider:dr. melendez  Would the patient rather a call back or a response via [facility]? Call back  Best Call Back Number:[phone]  Additional Information: pt states she's out and has been on oxygen due to not having any medication.    Thanks,  Rich Ochoa

## 2019-05-11 DIAGNOSIS — I20.9 AP (ANGINA PECTORIS): ICD-10-CM

## 2019-05-11 RX ORDER — ISOSORBIDE MONONITRATE 60 MG/1
TABLET, EXTENDED RELEASE ORAL
Qty: 180 TABLET | Refills: 3 | Status: SHIPPED | OUTPATIENT
Start: 2019-05-11

## 2019-05-21 ENCOUNTER — TELEPHONE (OUTPATIENT)
Dept: INTERNAL MEDICINE | Facility: CLINIC | Age: 84
End: 2019-05-21

## 2019-05-21 NOTE — TELEPHONE ENCOUNTER
----- Message from Justine Lord sent at 5/21/2019 12:13 PM CDT -----  Contact: Patient  Type:  Needs Medical Advice    Who Called: Patient  Symptoms (please be specific):    How long has patient had these symptoms:    Pharmacy name and phone #:    Would the patient rather a call back or a response via [facility]? call  Best Call Back Number: [phone]  Additional Information: Needs to talk to nurse about a procedure she is having

## 2019-05-21 NOTE — TELEPHONE ENCOUNTER
Left voicemail attempting to return caller's call; left message for patient to call back to schedule appointment if any needs.

## 2019-05-21 NOTE — TELEPHONE ENCOUNTER
----- Message from Gloria Warner sent at 5/21/2019  2:55 PM CDT -----  Contact: Self  Type:  Patient Returning Call    Who Called:Maldonado  Who Left Message for Patient:Boy  Does the patient know what this is regarding?:  Would the patient rather a call back or a response via [facility]? call  Best Call Back Number:[phone]  Additional Information:

## 2019-05-21 NOTE — TELEPHONE ENCOUNTER
Patient reported the endoscopy schedulers informed her she would need to see her doctor regarding her age prior to colonoscopy scheduling. Informed her I would speak with endoscopy schedulers. Patient verbalized understanding, thanked me, and ended call.    Per endoscopy scheduling, patient would need to see GI provider prior to colonoscopy for eval due to age. Endoscopy offered to contact patient again to notify her of this; informed them that is fine and to let her know that that is suggested course of treatment per Dr. Nogueira's office per Dr. Nogueira.

## 2019-05-29 ENCOUNTER — TELEPHONE (OUTPATIENT)
Dept: ENDOSCOPY | Facility: HOSPITAL | Age: 84
End: 2019-05-29

## 2019-05-29 NOTE — TELEPHONE ENCOUNTER
Pt left message on [email or web address]. Retrieved message and attempted to return pt call. Left pt a message to call our office.

## 2019-05-31 ENCOUNTER — TELEPHONE (OUTPATIENT)
Dept: ENDOSCOPY | Facility: HOSPITAL | Age: 84
End: 2019-05-31

## 2019-05-31 NOTE — TELEPHONE ENCOUNTER
Called pt to reschedule. Dr. Strauss wont be scoping the day the was scheduled. Moved her to 6/12. She verbalized an understanding.

## 2019-06-06 ENCOUNTER — TELEPHONE (OUTPATIENT)
Dept: ENDOSCOPY | Facility: HOSPITAL | Age: 84
End: 2019-06-06

## 2019-06-06 NOTE — TELEPHONE ENCOUNTER
The pt called to cancel her procedure for 6/12/19. She would like Dr. Strauss and would like a call back once his schedule is open.

## 2019-06-06 NOTE — TELEPHONE ENCOUNTER
The pt called requesting to speak to me while I was out to lunch. The pt had questions in regards to her prep. I attempted to call the pt back. No answer and no vm available.

## 2019-06-28 ENCOUNTER — OFFICE VISIT (OUTPATIENT)
Dept: INTERNAL MEDICINE | Facility: CLINIC | Age: 84
End: 2019-06-28
Payer: MEDICARE

## 2019-06-28 VITALS
WEIGHT: 224.44 LBS | DIASTOLIC BLOOD PRESSURE: 72 MMHG | HEIGHT: 64 IN | BODY MASS INDEX: 38.32 KG/M2 | OXYGEN SATURATION: 91 % | HEART RATE: 55 BPM | TEMPERATURE: 99 F | SYSTOLIC BLOOD PRESSURE: 112 MMHG

## 2019-06-28 DIAGNOSIS — I77.9 CAROTID ARTERY DISEASE WITHOUT CEREBRAL INFARCTION: ICD-10-CM

## 2019-06-28 DIAGNOSIS — R53.1 GENERAL WEAKNESS: ICD-10-CM

## 2019-06-28 DIAGNOSIS — I10 ESSENTIAL HYPERTENSION: ICD-10-CM

## 2019-06-28 DIAGNOSIS — J32.0 CHRONIC MAXILLARY SINUSITIS: ICD-10-CM

## 2019-06-28 DIAGNOSIS — C18.9 MALIGNANT NEOPLASM OF COLON, UNSPECIFIED PART OF COLON: ICD-10-CM

## 2019-06-28 DIAGNOSIS — E11.9 CONTROLLED TYPE 2 DIABETES MELLITUS WITHOUT COMPLICATION, WITHOUT LONG-TERM CURRENT USE OF INSULIN: Primary | ICD-10-CM
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
## 2019-06-28 PROCEDURE — 3078F PR MOST RECENT DIASTOLIC BLOOD PRESSURE < 80 MM HG: ICD-10-PCS | Mod: CPTII,S$GLB,, | Performed by: FAMILY MEDICINE

## 2019-06-28 PROCEDURE — 99499 UNLISTED E&M SERVICE: CPT | Mod: S$GLB,,, | Performed by: FAMILY MEDICINE

## 2019-06-28 PROCEDURE — 3074F SYST BP LT 130 MM HG: CPT | Mod: CPTII,S$GLB,, | Performed by: FAMILY MEDICINE

## 2019-06-28 PROCEDURE — 99214 PR OFFICE/OUTPT VISIT, EST, LEVL IV, 30-39 MIN: ICD-10-PCS | Mod: S$GLB,,, | Performed by: FAMILY MEDICINE

## 2019-06-28 PROCEDURE — 3074F PR MOST RECENT SYSTOLIC BLOOD PRESSURE < 130 MM HG: ICD-10-PCS | Mod: CPTII,S$GLB,, | Performed by: FAMILY MEDICINE

## 2019-06-28 PROCEDURE — 1101F PT FALLS ASSESS-DOCD LE1/YR: CPT | Mod: CPTII,S$GLB,, | Performed by: FAMILY MEDICINE

## 2019-06-28 PROCEDURE — 99999 PR PBB SHADOW E&M-EST. PATIENT-LVL III: CPT | Mod: PBBFAC,,, | Performed by: FAMILY MEDICINE

## 2019-06-28 PROCEDURE — 1101F PR PT FALLS ASSESS DOC 0-1 FALLS W/OUT INJ PAST YR: ICD-10-PCS | Mod: CPTII,S$GLB,, | Performed by: FAMILY MEDICINE

## 2019-06-28 PROCEDURE — 99499 RISK ADDL DX/OHS AUDIT: ICD-10-PCS | Mod: S$GLB,,, | Performed by: FAMILY MEDICINE

## 2019-06-28 PROCEDURE — 99999 PR PBB SHADOW E&M-EST. PATIENT-LVL III: ICD-10-PCS | Mod: PBBFAC,,, | Performed by: FAMILY MEDICINE

## 2019-06-28 PROCEDURE — 99214 OFFICE O/P EST MOD 30 MIN: CPT | Mod: S$GLB,,, | Performed by: FAMILY MEDICINE

## 2019-06-28 PROCEDURE — 3078F DIAST BP <80 MM HG: CPT | Mod: CPTII,S$GLB,, | Performed by: FAMILY MEDICINE

## 2019-06-28 RX ORDER — AMOXICILLIN AND CLAVULANATE POTASSIUM 500; 125 MG/1; MG/1
1 TABLET, FILM COATED ORAL 2 TIMES DAILY
Qty: 20 TABLET | Refills: 0 | Status: SHIPPED | OUTPATIENT
Start: 2019-06-28 | End: 2019-09-18

## 2019-06-28 RX ORDER — HYDROCODONE BITARTRATE AND ACETAMINOPHEN 10; 325 MG/1; MG/1
1 TABLET ORAL
Qty: 60 TABLET | Refills: 0 | Status: SHIPPED | OUTPATIENT
Start: 2019-06-28 | End: 2019-12-05 | Stop reason: CLARIF

## 2019-06-28 NOTE — PROGRESS NOTES
Subjective:       Patient ID: Maldonado Deleon is a 84 y.o. female.    Chief Complaint: Sinusitis    Pt is a 84 year old with sinus congestion, pressure and cough. Pt has chronic sinus issues. No fever. Pt is stable with other chronic conditions.     Review of Systems   Constitutional: Negative.    HENT: Positive for sinus pressure, sinus pain, sneezing and sore throat.    Respiratory: Negative.    Cardiovascular: Negative.    Gastrointestinal: Negative.    Genitourinary: Negative.    Neurological: Negative.    Hematological: Negative.        Objective:      Physical Exam   Constitutional: She is oriented to person, place, and time. She appears well-developed and well-nourished.   HENT:   Right Ear: Tympanic membrane is erythematous. A middle ear effusion is present.   Left Ear: Tympanic membrane is erythematous. A middle ear effusion is present.   Nose: Mucosal edema and rhinorrhea present. Right sinus exhibits maxillary sinus tenderness. Left sinus exhibits maxillary sinus tenderness.   Mouth/Throat: Oropharyngeal exudate present.   Neck: Normal range of motion. Neck supple.   Cardiovascular: Normal rate and regular rhythm.   No murmur heard.  Pulmonary/Chest: Effort normal and breath sounds normal. She has no wheezes. She has no rales.   Neurological: She is alert and oriented to person, place, and time.       Assessment:       1. Controlled type 2 diabetes mellitus without complication, without long-term current use of insulin    2. Carotid artery disease without cerebral infarction    3. Essential hypertension    4. Chronic maxillary sinusitis    5. General weakness    6. Fall, subsequent encounter    7. Malignant neoplasm of colon, unspecified part of colon        Plan:       Controlled type 2 diabetes mellitus without complication, without long-term current use of insulin  Comments:  Will get HgA1C.    Carotid artery disease without cerebral infarction  Comments:  stable and continue to see  cardiology    Essential hypertension  Comments:  BP is well controlled    Chronic maxillary sinusitis  Comments:  Will do augmentin 1 tab twice day 10 days    General weakness  Comments:  Will see if PT/OT for home health  Orders:  -     Ambulatory referral to Home Health    Fall, subsequent encounter  Comments:  Home health referral  Orders:  -     Ambulatory referral to Home Health    Malignant neoplasm of colon, unspecified part of colon  Comments:  stable    Other orders  -     HYDROcodone-acetaminophen (NORCO)  mg per tablet; Take 1 tablet by mouth every 24 hours as needed for Pain.  Dispense: 60 tablet; Refill: 0  -     amoxicillin-clavulanate 500-125mg (AUGMENTIN) 500-125 mg Tab; Take 1 tablet (500 mg total) by mouth 2 (two) times daily.  Dispense: 20 tablet; Refill: 0

## 2019-07-07 RX ORDER — METFORMIN HYDROCHLORIDE 500 MG/1
TABLET ORAL
Qty: 180 TABLET | Refills: 2 | Status: SHIPPED | OUTPATIENT
Start: 2019-07-07

## 2019-07-17 RX ORDER — GABAPENTIN 300 MG/1
CAPSULE ORAL
Qty: 150 CAPSULE | Refills: 3 | Status: SHIPPED | OUTPATIENT
Start: 2019-07-17 | End: 2020-06-25

## 2019-07-22 ENCOUNTER — TELEPHONE (OUTPATIENT)
Dept: CARDIOLOGY | Facility: CLINIC | Age: 84
End: 2019-07-22

## 2019-07-22 NOTE — TELEPHONE ENCOUNTER
Patient called to reschedule appointment.  Fell yesterday and is sore and bruised today.  Rescheduled tomorrow appointment for 9/23, earliest available.

## 2019-07-23 ENCOUNTER — OFFICE VISIT (OUTPATIENT)
Dept: INTERNAL MEDICINE | Facility: CLINIC | Age: 84
End: 2019-07-23
Payer: MEDICARE

## 2019-07-23 VITALS
HEART RATE: 53 BPM | SYSTOLIC BLOOD PRESSURE: 138 MMHG | BODY MASS INDEX: 38.91 KG/M2 | WEIGHT: 227.94 LBS | TEMPERATURE: 98 F | OXYGEN SATURATION: 87 % | HEIGHT: 64 IN | DIASTOLIC BLOOD PRESSURE: 66 MMHG

## 2019-07-23 DIAGNOSIS — R51.9 CHRONIC NONINTRACTABLE HEADACHE, UNSPECIFIED HEADACHE TYPE: ICD-10-CM

## 2019-07-23 DIAGNOSIS — G89.29 CHRONIC NONINTRACTABLE HEADACHE, UNSPECIFIED HEADACHE TYPE: ICD-10-CM

## 2019-07-23 DIAGNOSIS — G44.89 CHRONIC MIXED HEADACHE SYNDROME: ICD-10-CM

## 2019-07-23 DIAGNOSIS — G62.9 NEUROPATHY: Primary | ICD-10-CM

## 2019-07-23 DIAGNOSIS — I10 ESSENTIAL HYPERTENSION: ICD-10-CM

## 2019-07-23 PROCEDURE — 3075F PR MOST RECENT SYSTOLIC BLOOD PRESS GE 130-139MM HG: ICD-10-PCS | Mod: CPTII,S$GLB,, | Performed by: FAMILY MEDICINE

## 2019-07-23 PROCEDURE — 99999 PR PBB SHADOW E&M-EST. PATIENT-LVL IV: CPT | Mod: PBBFAC,,, | Performed by: FAMILY MEDICINE

## 2019-07-23 PROCEDURE — 3078F DIAST BP <80 MM HG: CPT | Mod: CPTII,S$GLB,, | Performed by: FAMILY MEDICINE

## 2019-07-23 PROCEDURE — 1101F PT FALLS ASSESS-DOCD LE1/YR: CPT | Mod: CPTII,S$GLB,, | Performed by: FAMILY MEDICINE

## 2019-07-23 PROCEDURE — 99213 OFFICE O/P EST LOW 20 MIN: CPT | Mod: S$GLB,,, | Performed by: FAMILY MEDICINE

## 2019-07-23 PROCEDURE — 99999 PR PBB SHADOW E&M-EST. PATIENT-LVL IV: ICD-10-PCS | Mod: PBBFAC,,, | Performed by: FAMILY MEDICINE

## 2019-07-23 PROCEDURE — 1101F PR PT FALLS ASSESS DOC 0-1 FALLS W/OUT INJ PAST YR: ICD-10-PCS | Mod: CPTII,S$GLB,, | Performed by: FAMILY MEDICINE

## 2019-07-23 PROCEDURE — 99213 PR OFFICE/OUTPT VISIT, EST, LEVL III, 20-29 MIN: ICD-10-PCS | Mod: S$GLB,,, | Performed by: FAMILY MEDICINE

## 2019-07-23 PROCEDURE — 3075F SYST BP GE 130 - 139MM HG: CPT | Mod: CPTII,S$GLB,, | Performed by: FAMILY MEDICINE

## 2019-07-23 PROCEDURE — 3078F PR MOST RECENT DIASTOLIC BLOOD PRESSURE < 80 MM HG: ICD-10-PCS | Mod: CPTII,S$GLB,, | Performed by: FAMILY MEDICINE

## 2019-07-23 NOTE — PROGRESS NOTES
Subjective:       Patient ID: Maldonado Deleon is a 84 y.o. female.    Chief Complaint: Fall and Head Injury    Pt has neuropathy and feels she looses her balance. Pt is currently on gabapentin but it is not helping. Have also discussed with pt on several occassions Neurology consult for headaches. Pt has a meningoma that has been stable and not felt to be cause of headaches.    Review of Systems   Constitutional: Negative.    HENT: Negative for mouth sores, postnasal drip and sinus pain.    Respiratory: Negative.    Cardiovascular: Negative.  Negative for leg swelling.   Gastrointestinal: Negative.    Genitourinary: Negative.  Negative for frequency, genital sores, hematuria and menstrual problem.   Neurological: Positive for headaches. Negative for seizures, speech difficulty and light-headedness.   Hematological: Negative.    Psychiatric/Behavioral: Negative.        Objective:      Physical Exam   Constitutional: She is oriented to person, place, and time. She appears well-developed and well-nourished.   Cardiovascular: Normal rate and regular rhythm. Exam reveals no friction rub.   No murmur heard.  Pulmonary/Chest: Effort normal and breath sounds normal. No stridor. She has no wheezes.   Abdominal: Soft. Bowel sounds are normal. There is no tenderness. There is no guarding.   Neurological: She is alert and oriented to person, place, and time. She displays normal reflexes. No cranial nerve deficit or sensory deficit. Coordination normal.   Skin: Skin is warm and dry.       Assessment:       1. Neuropathy    2. Chronic nonintractable headache, unspecified headache type    3. Essential hypertension    4. Chronic mixed headache syndrome        Plan:       Neuropathy  -     Ambulatory referral to Home Health  -     Ambulatory consult to Neurology    Chronic nonintractable headache, unspecified headache type  -     Ambulatory referral to Home Health  -     Ambulatory consult to Neurology    Essential  hypertension    Chronic mixed headache syndrome  -     CT Head Without Contrast; Future; Expected date: 07/23/2019

## 2019-07-26 ENCOUNTER — TELEPHONE (OUTPATIENT)
Dept: INTERNAL MEDICINE | Facility: CLINIC | Age: 84
End: 2019-07-26

## 2019-07-26 NOTE — TELEPHONE ENCOUNTER
I s/w pt in regards to referral. I informed her I would print everything that [facility] needs and I will fax it over to them for her. Pt thanked me for returning her call. //BJ

## 2019-07-26 NOTE — TELEPHONE ENCOUNTER
----- Message from Jenny Johnston sent at 7/26/2019  3:48 PM CDT -----  Contact: self/[phone]  Would like to consult with nurse regarding a order from Home Health. Patient states that [facility] need a referral for home health office notes. Please call back at [phone]. Thanks/ar

## 2019-07-29 ENCOUNTER — HOSPITAL ENCOUNTER (OUTPATIENT)
Dept: RADIOLOGY | Facility: HOSPITAL | Age: 84
Discharge: HOME | End: 2019-07-29
Attending: FAMILY MEDICINE
Payer: MEDICARE

## 2019-07-29 DIAGNOSIS — G44.89 CHRONIC MIXED HEADACHE SYNDROME: ICD-10-CM

## 2019-07-29 PROCEDURE — 70450 CT HEAD WITHOUT CONTRAST: ICD-10-PCS | Mod: 26,,, | Performed by: RADIOLOGY

## 2019-07-29 PROCEDURE — 70450 CT HEAD/BRAIN W/O DYE: CPT | Mod: TC

## 2019-07-29 PROCEDURE — 70450 CT HEAD/BRAIN W/O DYE: CPT | Mod: 26,,, | Performed by: RADIOLOGY

## 2019-07-30 ENCOUNTER — TELEPHONE (OUTPATIENT)
Dept: INTERNAL MEDICINE | Facility: CLINIC | Age: 84
End: 2019-07-30

## 2019-07-30 NOTE — TELEPHONE ENCOUNTER
----- Message from Anna Shepard sent at 7/30/2019 11:53 AM CDT -----  Contact: Jessica/[facility]  States she received an order for home health and the Home Health Agency is out of network. They needs an in network agency. The patient has had [facility] in the passed. Please call Jessica at [phone]. Thank you

## 2019-08-02 ENCOUNTER — TELEPHONE (OUTPATIENT)
Dept: INTERNAL MEDICINE | Facility: CLINIC | Age: 84
End: 2019-08-02

## 2019-08-02 NOTE — TELEPHONE ENCOUNTER
----- Message from Stephanie Maza sent at 7/31/2019  8:30 AM CDT -----  Contact: [facility]  returned call regarding patient...[phone]

## 2019-08-02 NOTE — TELEPHONE ENCOUNTER
Jessica with [facility] informed me [facility]  is not a participating provider for [facility], so patient would need to be redirected to different home health service for home health care. Provided verbal order to redirect care to [facility]. RICKY.    Forwarded to Dr. Nogueira for review.

## 2019-08-05 NOTE — TELEPHONE ENCOUNTER
Bryson Nogueira MD  You Yesterday (5:08 AM)      That is fine for Home Health    Routing comment       You  Bryson Nogueira MD 3 days ago      Please see my note regarding verbal order for home health.    Routing comment

## 2019-08-07 PROCEDURE — G0180 MD CERTIFICATION HHA PATIENT: HCPCS | Mod: ,,, | Performed by: FAMILY MEDICINE

## 2019-08-07 PROCEDURE — G0180 PR HOME HEALTH MD CERTIFICATION: ICD-10-PCS | Mod: ,,, | Performed by: FAMILY MEDICINE

## 2019-08-22 ENCOUNTER — TELEPHONE (OUTPATIENT)
Dept: INTERNAL MEDICINE | Facility: CLINIC | Age: 84
End: 2019-08-22

## 2019-08-22 NOTE — TELEPHONE ENCOUNTER
I returned pts call  In regards to copy of CT, I informed pt that I would give radiology a call and inform them that she will be coming in today to  a copy of CT scan. //BJ

## 2019-08-22 NOTE — TELEPHONE ENCOUNTER
----- Message from Justine Wu-Olivia sent at 8/22/2019  8:52 AM CDT -----  Contact: Patient  Patient needs a copy of her CT results for Neuromedcial- Dr Westfall, patient will pick it up, please call her back when ready at [phone]. Thank you

## 2019-08-27 ENCOUNTER — EXTERNAL HOME HEALTH (OUTPATIENT)
Dept: HOME HEALTH SERVICES | Facility: HOSPITAL | Age: 84
End: 2019-08-27
Payer: MEDICARE

## 2019-09-18 ENCOUNTER — OFFICE VISIT (OUTPATIENT)
Dept: INTERNAL MEDICINE | Facility: CLINIC | Age: 84
End: 2019-09-18
Payer: MEDICARE

## 2019-09-18 VITALS
OXYGEN SATURATION: 95 % | WEIGHT: 224 LBS | BODY MASS INDEX: 38.24 KG/M2 | HEART RATE: 70 BPM | HEIGHT: 64 IN | TEMPERATURE: 100 F | DIASTOLIC BLOOD PRESSURE: 89 MMHG | SYSTOLIC BLOOD PRESSURE: 138 MMHG

## 2019-09-18 DIAGNOSIS — R53.1 GENERAL WEAKNESS: Primary | ICD-10-CM

## 2019-09-18 DIAGNOSIS — C18.9 MALIGNANT NEOPLASM OF COLON, UNSPECIFIED PART OF COLON: ICD-10-CM

## 2019-09-18 DIAGNOSIS — E11.9 CONTROLLED TYPE 2 DIABETES MELLITUS WITHOUT COMPLICATION, WITHOUT LONG-TERM CURRENT USE OF INSULIN: ICD-10-CM

## 2019-09-18 DIAGNOSIS — R26.81 UNSTEADINESS ON FEET: ICD-10-CM
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
## 2019-09-18 PROCEDURE — 99499 UNLISTED E&M SERVICE: CPT | Mod: S$GLB,,, | Performed by: FAMILY MEDICINE

## 2019-09-18 PROCEDURE — 99499 RISK ADDL DX/OHS AUDIT: ICD-10-PCS | Mod: S$GLB,,, | Performed by: FAMILY MEDICINE

## 2019-09-18 PROCEDURE — 3075F PR MOST RECENT SYSTOLIC BLOOD PRESS GE 130-139MM HG: ICD-10-PCS | Mod: CPTII,S$GLB,, | Performed by: FAMILY MEDICINE

## 2019-09-18 PROCEDURE — 99999 PR PBB SHADOW E&M-EST. PATIENT-LVL III: CPT | Mod: PBBFAC,,, | Performed by: FAMILY MEDICINE

## 2019-09-18 PROCEDURE — 1101F PR PT FALLS ASSESS DOC 0-1 FALLS W/OUT INJ PAST YR: ICD-10-PCS | Mod: CPTII,S$GLB,, | Performed by: FAMILY MEDICINE

## 2019-09-18 PROCEDURE — 3075F SYST BP GE 130 - 139MM HG: CPT | Mod: CPTII,S$GLB,, | Performed by: FAMILY MEDICINE

## 2019-09-18 PROCEDURE — 3079F PR MOST RECENT DIASTOLIC BLOOD PRESSURE 80-89 MM HG: ICD-10-PCS | Mod: CPTII,S$GLB,, | Performed by: FAMILY MEDICINE

## 2019-09-18 PROCEDURE — 99999 PR PBB SHADOW E&M-EST. PATIENT-LVL III: ICD-10-PCS | Mod: PBBFAC,,, | Performed by: FAMILY MEDICINE

## 2019-09-18 PROCEDURE — 99214 OFFICE O/P EST MOD 30 MIN: CPT | Mod: S$GLB,,, | Performed by: FAMILY MEDICINE

## 2019-09-18 PROCEDURE — 3079F DIAST BP 80-89 MM HG: CPT | Mod: CPTII,S$GLB,, | Performed by: FAMILY MEDICINE

## 2019-09-18 PROCEDURE — 99214 PR OFFICE/OUTPT VISIT, EST, LEVL IV, 30-39 MIN: ICD-10-PCS | Mod: S$GLB,,, | Performed by: FAMILY MEDICINE

## 2019-09-18 PROCEDURE — 1101F PT FALLS ASSESS-DOCD LE1/YR: CPT | Mod: CPTII,S$GLB,, | Performed by: FAMILY MEDICINE

## 2019-09-18 NOTE — PROGRESS NOTES
Subjective:       Patient ID: Maldonado Deleon is a 84 y.o. female.    Chief Complaint: No chief complaint on file.    Pt is a 84 year old here for follow-up. Pt has severe neuropathy looses balance. Pt has no other neurological pathology as noted on CT and MRI. Pt is on gabapentin but concern is increasing dose cause further balance issues. Pt is a controlled DM    Review of Systems   Constitutional: Negative.    Respiratory: Negative.    Cardiovascular: Negative.    Genitourinary: Negative.    Musculoskeletal: Negative.    Neurological: Negative.    Hematological: Negative.    Psychiatric/Behavioral: Negative.        Objective:      Physical Exam   Constitutional: She appears well-developed and well-nourished.   Cardiovascular: Normal rate and regular rhythm.   Pulmonary/Chest: Effort normal and breath sounds normal. No stridor. She has no wheezes.   Abdominal: Soft. Bowel sounds are normal.   Neurological: She is alert.   Skin: Skin is dry.   Psychiatric: She has a normal mood and affect. Her behavior is normal.       Assessment:       1. General weakness    2. Unsteadiness on feet    3. Fall, subsequent encounter    4. Controlled type 2 diabetes mellitus without complication, without long-term current use of insulin    5. Malignant neoplasm of colon, unspecified part of colon        Plan:       General weakness  Comments:  Will see if pt qualifies for a scooter  Orders:  -     Ambulatory consult to Physical Therapy    Unsteadiness on feet  Comments:  2nd to neuropathy. Increase gabapentin dose to 1 morning and 2 at night  Orders:  -     Ambulatory consult to Physical Therapy    Fall, subsequent encounter  Comments:  Mobility Exam  Orders:  -     Ambulatory consult to Physical Therapy    Controlled type 2 diabetes mellitus without complication, without long-term current use of insulin  Comments:  Will get HgA1c and lipid panel.  Orders:  -     Comprehensive metabolic panel; Future; Expected date: 09/18/2019  -      Hemoglobin A1c; Future; Expected date: 09/18/2019  -     Lipid panel; Future; Expected date: 09/18/2019    Malignant neoplasm of colon, unspecified part of colon  Comments:  ordered colonoscopy  Orders:  -     Case request GI: COLONOSCOPY

## 2019-09-19 ENCOUNTER — TELEPHONE (OUTPATIENT)
Dept: ENDOSCOPY | Facility: HOSPITAL | Age: 84
End: 2019-09-19

## 2019-09-19 NOTE — TELEPHONE ENCOUNTER
Patient has cancelled endoscopy procedure multiple times and she states she would like to only be scoped by Dr. Strauss. Dr. Strauss will only be scoping at [facility]. After careful review of patient chart I have chosen to seek approval on if patient is a candidate to be scheduled at [facility]. Please Advice on if you feel it is appropriate to place patient on Dr. Strauss Schedule at [facility].

## 2019-09-23 ENCOUNTER — OFFICE VISIT (OUTPATIENT)
Dept: CARDIOLOGY | Facility: CLINIC | Age: 84
End: 2019-09-23
Payer: MEDICARE

## 2019-09-23 ENCOUNTER — TELEPHONE (OUTPATIENT)
Dept: ENDOSCOPY | Facility: HOSPITAL | Age: 84
End: 2019-09-23

## 2019-09-23 ENCOUNTER — CLINICAL SUPPORT (OUTPATIENT)
Dept: CARDIOLOGY | Facility: CLINIC | Age: 84
End: 2019-09-23
Payer: MEDICARE

## 2019-09-23 VITALS — DIASTOLIC BLOOD PRESSURE: 84 MMHG | SYSTOLIC BLOOD PRESSURE: 152 MMHG | HEART RATE: 58 BPM | OXYGEN SATURATION: 92 %

## 2019-09-23 DIAGNOSIS — I65.23 ASYMPTOMATIC STENOSIS OF BOTH CAROTID ARTERIES WITHOUT INFARCTION: ICD-10-CM

## 2019-09-23 DIAGNOSIS — R60.0 EDEMA OF BOTH LEGS: ICD-10-CM

## 2019-09-23 DIAGNOSIS — I77.9 CAROTID ARTERY DISEASE WITHOUT CEREBRAL INFARCTION: ICD-10-CM

## 2019-09-23 DIAGNOSIS — R42 DIZZINESS: ICD-10-CM

## 2019-09-23 DIAGNOSIS — R53.1 GENERAL WEAKNESS: ICD-10-CM

## 2019-09-23 DIAGNOSIS — R94.31 ABNORMAL ECG: ICD-10-CM

## 2019-09-23 DIAGNOSIS — E11.9 CONTROLLED TYPE 2 DIABETES MELLITUS WITHOUT COMPLICATION, WITHOUT LONG-TERM CURRENT USE OF INSULIN: ICD-10-CM

## 2019-09-23 DIAGNOSIS — I10 ESSENTIAL HYPERTENSION: ICD-10-CM

## 2019-09-23 DIAGNOSIS — I77.1 STENOSIS OF RIGHT SUBCLAVIAN ARTERY: ICD-10-CM

## 2019-09-23 DIAGNOSIS — I25.10 CAD IN NATIVE ARTERY: Primary | ICD-10-CM

## 2019-09-23 DIAGNOSIS — I20.9 AP (ANGINA PECTORIS): ICD-10-CM

## 2019-09-23 DIAGNOSIS — Z98.61 HISTORY OF PTCA: ICD-10-CM

## 2019-09-23 DIAGNOSIS — E78.2 MIXED HYPERLIPIDEMIA: ICD-10-CM

## 2019-09-23 DIAGNOSIS — E66.01 CLASS 2 SEVERE OBESITY DUE TO EXCESS CALORIES WITH SERIOUS COMORBIDITY AND BODY MASS INDEX (BMI) OF 39.0 TO 39.9 IN ADULT: ICD-10-CM

## 2019-09-23 DIAGNOSIS — I10 ESSENTIAL HYPERTENSION: Primary | ICD-10-CM

## 2019-09-23 DIAGNOSIS — I51.89 DIASTOLIC DYSFUNCTION: ICD-10-CM

## 2019-09-23 PROCEDURE — 99214 OFFICE O/P EST MOD 30 MIN: CPT | Mod: S$GLB,,, | Performed by: INTERNAL MEDICINE

## 2019-09-23 PROCEDURE — 93000 ELECTROCARDIOGRAM COMPLETE: CPT | Mod: S$GLB,,, | Performed by: INTERNAL MEDICINE

## 2019-09-23 PROCEDURE — 1101F PT FALLS ASSESS-DOCD LE1/YR: CPT | Mod: CPTII,S$GLB,, | Performed by: INTERNAL MEDICINE

## 2019-09-23 PROCEDURE — 3077F SYST BP >= 140 MM HG: CPT | Mod: CPTII,S$GLB,, | Performed by: INTERNAL MEDICINE

## 2019-09-23 PROCEDURE — 99999 PR PBB SHADOW E&M-EST. PATIENT-LVL III: ICD-10-PCS | Mod: PBBFAC,,, | Performed by: INTERNAL MEDICINE

## 2019-09-23 PROCEDURE — 3077F PR MOST RECENT SYSTOLIC BLOOD PRESSURE >= 140 MM HG: ICD-10-PCS | Mod: CPTII,S$GLB,, | Performed by: INTERNAL MEDICINE

## 2019-09-23 PROCEDURE — 3079F PR MOST RECENT DIASTOLIC BLOOD PRESSURE 80-89 MM HG: ICD-10-PCS | Mod: CPTII,S$GLB,, | Performed by: INTERNAL MEDICINE

## 2019-09-23 PROCEDURE — 93000 EKG 12-LEAD: ICD-10-PCS | Mod: S$GLB,,, | Performed by: INTERNAL MEDICINE

## 2019-09-23 PROCEDURE — 99999 PR PBB SHADOW E&M-EST. PATIENT-LVL III: CPT | Mod: PBBFAC,,, | Performed by: INTERNAL MEDICINE

## 2019-09-23 PROCEDURE — 3079F DIAST BP 80-89 MM HG: CPT | Mod: CPTII,S$GLB,, | Performed by: INTERNAL MEDICINE

## 2019-09-23 PROCEDURE — 1101F PR PT FALLS ASSESS DOC 0-1 FALLS W/OUT INJ PAST YR: ICD-10-PCS | Mod: CPTII,S$GLB,, | Performed by: INTERNAL MEDICINE

## 2019-09-23 PROCEDURE — 99214 PR OFFICE/OUTPT VISIT, EST, LEVL IV, 30-39 MIN: ICD-10-PCS | Mod: S$GLB,,, | Performed by: INTERNAL MEDICINE

## 2019-09-23 NOTE — PROGRESS NOTES
Subjective:    Patient ID:  Maldonado Deleon is a 84 y.o. female who presents for evaluation of Hypertension; Hyperlipidemia; Coronary Artery Disease; Risk Factor Management For Atherosclerosis; Carotid Artery Disease; and Peripheral Arterial Disease      HPI Pt presents for f/u.  Her current medical conditions include CAD, COPD, right subclavian stenosis, carotid artery disease, hypertension, hyperlipidemia, diabetes (former smoker). Nonsmoker.  Past hx pertinent for following:  On home O2 qhs.   s/p LHC 6/15 for anginal sxs and had PCI of calcified 90% mid-RCA stenosis with Diamondback atherectomy and LINDSAY x one. Her mid-LAD stenosis was overall stable in 50 - 60% range and medical mgt advised.  Carotid u/s 7/18 < 50% stenosis, and R VA retrograde flow as noted in past.  - stress MPI 2016.   Now here.   ecg today shows NSR, low precordial R waves.  No acute changes noted.    CAD seems stable. She has controlled angina on current meds.  Takes sl ntg on occasion, maybe 3 times since I last saw her at beginning of the year.  She has generalized weakness and poses fall risk.  She has chronic dyspnea.  She uses home O2 qhs.  BP overall controlled.  Fluctuates.  She has put on some weight -- she states 20 pounds this year.  No TIA/CVA sxs.  No abnl bleeding on asa, plavix.   DM HGAIC well controlled on current tx.  Chronic dizziness, bending over, stable.  Chronic fatigue, unchanged.      Current Outpatient Medications:     acetaminophen (TYLENOL) 325 MG tablet, Take 650 mg by mouth every 6 (six) hours as needed for Pain., Disp: , Rfl:     albuterol 90 mcg/actuation inhaler, Inhale 2 puffs into the lungs every 6 (six) hours., Disp: 1 Inhaler, Rfl: 12    albuterol-ipratropium (DUO-NEB) 2.5 mg-0.5 mg/3 mL nebulizer solution, Take 3 mLs by nebulization every 6 (six) hours., Disp: 120 vial, Rfl: 12    amlodipine (NORVASC) 2.5 MG tablet, Take 1 tablet (2.5 mg total) by mouth once daily., Disp: 30 tablet, Rfl: 11    aspirin  (ECOTRIN) 81 MG EC tablet, Take 81 mg by mouth once daily., Disp: , Rfl:     atenolol (TENORMIN) 25 MG tablet, Take 1 tablet (25 mg total) by mouth once daily., Disp: 90 tablet, Rfl: 3    benazepril (LOTENSIN) 20 MG tablet, Take 1 tablet (20 mg total) by mouth 2 (two) times daily., Disp: 180 tablet, Rfl: 3    calcium carbonate (OS-RICHY) 600 mg (1,500 mg) Tab, Take 600 mg by mouth once daily. , Disp: , Rfl:     clopidogrel (PLAVIX) 75 mg tablet, Take 1 tablet (75 mg total) by mouth once daily., Disp: 90 tablet, Rfl: 3    fluticasone-vilanterol (BREO ELLIPTA) 200-25 mcg/dose DsDv diskus inhaler, Inhale 1 puff into the lungs once daily. Controller, Disp: 1 each, Rfl: 11    gabapentin (NEURONTIN) 300 MG capsule, TAKE 2 CAPSULES BY MOUTH ONCE DAILY IN THE MORNING, TAKE  1  CAPSULE  AT  NOON,  AND TAKE 2  CAPSULES  AT  NIGHT, Disp: 150 capsule, Rfl: 3    hydrALAZINE (APRESOLINE) 10 MG tablet, Take 1 tablet (10 mg total) by mouth 3 (three) times daily., Disp: 90 tablet, Rfl: 11    HYDROcodone-acetaminophen (NORCO)  mg per tablet, Take 1 tablet by mouth every 24 hours as needed for Pain., Disp: 60 tablet, Rfl: 0    ipratropium (ATROVENT) 42 mcg (0.06 %) nasal spray, 2 sprays by Nasal route 4 (four) times daily. As needed for rhinitis, Disp: 15 mL, Rfl: 11    isosorbide mononitrate (IMDUR) 60 MG 24 hr tablet, TAKE 1 TABLET BY MOUTH TWICE DAILY, Disp: 180 tablet, Rfl: 3    levocetirizine (XYZAL) 5 MG tablet, TAKE ONE TABLET BY MOUTH IN THE EVENING, Disp: 30 tablet, Rfl: 11    melatonin 10 mg Tab, Take 1 tablet by mouth every evening., Disp: , Rfl:     metFORMIN (GLUCOPHAGE) 500 MG tablet, TAKE 1 TABLET BY MOUTH TWICE DAILY WITH MEALS, Disp: 180 tablet, Rfl: 2    milk thistle 175 mg tablet, Take 175 mg by mouth once daily., Disp: , Rfl:     nebulizer accessories Kit, Use with nebulizer, Disp: 1 kit, Rfl: prn    nitroGLYCERIN (NITROSTAT) 0.4 MG SL tablet, Place 1 tablet (0.4 mg total) under the tongue every  5 (five) minutes as needed for Chest pain., Disp: 60 tablet, Rfl: 12    pregabalin (LYRICA) 50 MG capsule, Take 1 capsule (50 mg total) by mouth 2 (two) times daily., Disp: 120 capsule, Rfl: 0    rosuvastatin (CRESTOR) 20 MG tablet, Take 1 tablet (20 mg total) by mouth every evening., Disp: 90 tablet, Rfl: 3    sertraline (ZOLOFT) 100 MG tablet, Take 100 mg by mouth once daily., Disp: , Rfl:     sertraline (ZOLOFT) 50 MG tablet, Take 1 tablet (50 mg total) by mouth once daily., Disp: 90 tablet, Rfl: 2    UNABLE TO FIND, 1 tablet once daily. MULTIVIT-iron-FA-calcium-mins tab 9mg-iron-400mcg, Disp: , Rfl:       Review of Systems   Constitution: Positive for malaise/fatigue and weight gain.   HENT: Negative.    Eyes: Negative.    Cardiovascular: Positive for chest pain, dyspnea on exertion and leg swelling.   Respiratory: Positive for shortness of breath.    Endocrine: Negative.    Hematologic/Lymphatic: Negative.    Skin: Negative.    Musculoskeletal: Positive for arthritis and joint pain.   Gastrointestinal: Negative.    Genitourinary: Negative.    Neurological: Positive for dizziness, loss of balance and weakness.   Psychiatric/Behavioral: Negative.    Allergic/Immunologic: Negative.        BP (!) 152/84 (BP Location: Right arm, Patient Position: Sitting)   Pulse (!) 58   SpO2 (!) 92%     Wt Readings from Last 3 Encounters:   09/18/19 101.6 kg (223 lb 15.8 oz)   07/23/19 103.4 kg (227 lb 15.3 oz)   06/28/19 101.8 kg (224 lb 6.9 oz)     Temp Readings from Last 3 Encounters:   09/18/19 99.5 °F (37.5 °C) (Tympanic)   07/23/19 98.2 °F (36.8 °C) (Tympanic)   06/28/19 98.5 °F (36.9 °C) (Tympanic)     BP Readings from Last 3 Encounters:   09/23/19 (!) 152/84   09/18/19 138/89   07/23/19 138/66     Pulse Readings from Last 3 Encounters:   09/23/19 (!) 58   09/18/19 70   07/23/19 (!) 53          Objective:    Physical Exam   Constitutional: She is oriented to person, place, and time. Vital signs are normal. She  appears well-developed and well-nourished. She is active and cooperative. She does not have a sickly appearance. She does not appear ill. No distress.   HENT:   Head: Normocephalic.   Neck: Neck supple. Normal carotid pulses, no hepatojugular reflux and no JVD present. Carotid bruit is not present. No thyromegaly present.   Cardiovascular: Normal rate, regular rhythm, S1 normal, S2 normal and normal pulses. PMI is not displaced. Exam reveals no gallop and no friction rub.   Murmur heard.   Medium-pitched midsystolic murmur is present with a grade of 2/6 at the upper left sternal border.  Pulses:       Radial pulses are 2+ on the right side, and 2+ on the left side.   Pulmonary/Chest: Effort normal and breath sounds normal. She has no wheezes. She has no rales.   Abdominal: Soft. Normal appearance, normal aorta and bowel sounds are normal. She exhibits no pulsatile liver, no abdominal bruit, no ascites and no mass. There is no splenomegaly or hepatomegaly. There is no tenderness.   obese   Musculoskeletal: She exhibits edema.   Lymphadenopathy:     She has no cervical adenopathy.   Neurological: She is alert and oriented to person, place, and time.   Skin: Skin is warm. She is not diaphoretic.   Psychiatric: She has a normal mood and affect. Her behavior is normal.   Nursing note and vitals reviewed.      I have reviewed all pertinent labs and cardiac studies.    Lab Results   Component Value Date    HGBA1C 5.8 (H) 08/20/2018     Lab Results   Component Value Date    CHOL 134 07/18/2018    CHOL 192 01/16/2018    CHOL 168 07/19/2016     Lab Results   Component Value Date    HDL 61 07/18/2018    HDL 61 01/16/2018    HDL 54 07/19/2016     Lab Results   Component Value Date    LDLCALC 51.8 (L) 07/18/2018    LDLCALC 108.0 01/16/2018    LDLCALC 92.0 07/19/2016     Lab Results   Component Value Date    TRIG 106 07/18/2018    TRIG 115 01/16/2018    TRIG 110 07/19/2016     Lab Results   Component Value Date    CHOLHDL 45.5  07/18/2018    CHOLHDL 31.8 01/16/2018    CHOLHDL 32.1 07/19/2016       Chemistry        Component Value Date/Time     07/18/2018 0816    K 4.3 07/18/2018 0816     07/18/2018 0816    CO2 30 (H) 07/18/2018 0816    BUN 20 07/18/2018 0816    CREATININE 0.8 07/18/2018 0816    GLU 98 07/18/2018 0816        Component Value Date/Time    CALCIUM 9.6 07/18/2018 0816    ALKPHOS 58 07/18/2018 0816    AST 19 07/18/2018 0816    ALT 16 07/18/2018 0816    BILITOT 0.7 07/18/2018 0816    ESTGFRAFRICA >60.0 07/18/2018 0816    EGFRNONAA >60.0 07/18/2018 0816        Lab Results   Component Value Date    WBC 9.16 08/20/2018    HGB 12.7 08/20/2018    HCT 41.1 08/20/2018    MCV 93 08/20/2018     08/20/2018           Assessment:       1. CAD in native artery    2. Stenosis of right subclavian artery    3. Mixed hyperlipidemia    4. History of PTCA    5. Essential hypertension    6. Diastolic dysfunction    7. Controlled type 2 diabetes mellitus without complication, without long-term current use of insulin    8. Class 2 severe obesity due to excess calories with serious comorbidity and body mass index (BMI) of 39.0 to 39.9 in adult    9. Carotid artery disease without cerebral infarction    10. Asymptomatic stenosis of both carotid arteries without infarction    11. AP (angina pectoris)    12. Abnormal ECG    13. General weakness    14. Dizziness    15. Edema of both legs         Plan:             Stable CV conditions on current medical tx.  Reviewed all tests and above medical conditions with pt in detail and formulated treatment plan.  Continue optimal medical tx for CV conditions.  Sl ntg prn for breakthrough angina.  Stress MPI.  Echocardiogram.  Cardiac low salt diet discussed.  Daily exercise, goal 30 +  minutes.  Appropriate weight control.  Medical tx for subclavian vasc disease.   F/u with Pulmonary for COPD.  Phone review for test results.     F/u in 6 months.

## 2019-09-24 ENCOUNTER — LAB VISIT (OUTPATIENT)
Dept: LAB | Facility: HOSPITAL | Age: 84
End: 2019-09-24
Attending: FAMILY MEDICINE
Payer: MEDICARE

## 2019-09-24 ENCOUNTER — TELEPHONE (OUTPATIENT)
Dept: ENDOSCOPY | Facility: HOSPITAL | Age: 84
End: 2019-09-24

## 2019-09-24 DIAGNOSIS — E11.9 CONTROLLED TYPE 2 DIABETES MELLITUS WITHOUT COMPLICATION, WITHOUT LONG-TERM CURRENT USE OF INSULIN: ICD-10-CM

## 2019-09-24 LAB
ALBUMIN SERPL BCP-MCNC: 3.8 G/DL (ref 3.5–5.2)
ALP SERPL-CCNC: 50 U/L (ref 55–135)
ALT SERPL W/O P-5'-P-CCNC: 16 U/L (ref 10–44)
ANION GAP SERPL CALC-SCNC: 9 MMOL/L (ref 8–16)
AST SERPL-CCNC: 25 U/L (ref 10–40)
BILIRUB SERPL-MCNC: 0.4 MG/DL (ref 0.1–1)
BUN SERPL-MCNC: 27 MG/DL (ref 8–23)
CALCIUM SERPL-MCNC: 9.8 MG/DL (ref 8.7–10.5)
CHLORIDE SERPL-SCNC: 103 MMOL/L (ref 95–110)
CHOLEST SERPL-MCNC: 136 MG/DL (ref 120–199)
CHOLEST/HDLC SERPL: 2.1 {RATIO} (ref 2–5)
CO2 SERPL-SCNC: 31 MMOL/L (ref 23–29)
CREAT SERPL-MCNC: 0.8 MG/DL (ref 0.5–1.4)
EST. GFR  (AFRICAN AMERICAN): >60 ML/MIN/1.73 M^2
EST. GFR  (NON AFRICAN AMERICAN): >60 ML/MIN/1.73 M^2
ESTIMATED AVG GLUCOSE: 117 MG/DL (ref 68–131)
GLUCOSE SERPL-MCNC: 82 MG/DL (ref 70–110)
HBA1C MFR BLD HPLC: 5.7 % (ref 4–5.6)
HDLC SERPL-MCNC: 66 MG/DL (ref 40–75)
HDLC SERPL: 48.5 % (ref 20–50)
LDLC SERPL CALC-MCNC: 43.6 MG/DL (ref 63–159)
NONHDLC SERPL-MCNC: 70 MG/DL
POTASSIUM SERPL-SCNC: 4.5 MMOL/L (ref 3.5–5.1)
PROT SERPL-MCNC: 7.1 G/DL (ref 6–8.4)
SODIUM SERPL-SCNC: 143 MMOL/L (ref 136–145)
TRIGL SERPL-MCNC: 132 MG/DL (ref 30–150)

## 2019-09-24 PROCEDURE — 80053 COMPREHEN METABOLIC PANEL: CPT

## 2019-09-24 PROCEDURE — 83036 HEMOGLOBIN GLYCOSYLATED A1C: CPT

## 2019-09-24 PROCEDURE — 80061 LIPID PANEL: CPT

## 2019-09-24 PROCEDURE — 36415 COLL VENOUS BLD VENIPUNCTURE: CPT | Mod: PO

## 2019-09-24 NOTE — TELEPHONE ENCOUNTER
Spoke with patient and rescheduled procedure to [place] location due to cardiac history.  Patient stated she has taken nitrostat.  Patient scheduled for 11- with dr william, verbalized understanding.

## 2019-09-24 NOTE — TELEPHONE ENCOUNTER
Please advise if patient may hold Plavix 5 days prior to endoscopy procedure. Upon your approval, our team will inform patient of medication clearance prior to procedure.  Procedure is scheduled for 10-.  Thanks for your time.

## 2019-09-24 NOTE — TELEPHONE ENCOUNTER
Per scheduling protocol, you are receiving this message due to patient being over 82 years old.  Patient has a colonoscopy  scheduled for 10- at the [facility] with you.  Please review chart and thanks for your time.

## 2019-09-24 NOTE — TELEPHONE ENCOUNTER
Endoscopy Scheduling Questionnaire:    1. Have you been admitted overnight to the hospital in the past 3 months? no  2. Have you had a cardiac stent placed in the past 12 months? no  3. Have you had a stroke or heart attack in the past 6 months? no  4. Have you had any chest pain in the past 3 months? no      If so, have you been evaluated by your PCP or Cardiologist? no  5. Do you take prescription weight loss medication? no  6. Have you been diagnosed with Diverticulitis within the past 3 months? no  7. Are you on dialysis? no  8. Are you diabetic? yes Do you have an insulin pump? no  9. Do you have any other health issues that you feel might limit your ability to safely have the procedure and/or sedation? no  10. Is the patient over 81 yo? yes        If so, has the patient been seen by their PCP or GI in the last 6 months? Yes. A message was sent to Dr. Strauss for chart review.       -I have reviewed the last colonoscopy for recommendations regarding surveillance and bowel prep  Yes  -I have reviewed the patient's medications and allergies. She is on high risk medication, A clearance request was sent to Dr. orestes pollack  -I have verified the pharmacy information. The prep being used is Miralax. The patient's prep instructions were sent via mail..    Date Endoscopy Scheduled: Date: 10-  Patient requested dr strauss.

## 2019-09-30 ENCOUNTER — OFFICE VISIT (OUTPATIENT)
Dept: PULMONOLOGY | Facility: CLINIC | Age: 84
End: 2019-09-30
Payer: MEDICARE

## 2019-09-30 ENCOUNTER — CLINICAL SUPPORT (OUTPATIENT)
Dept: PULMONOLOGY | Facility: CLINIC | Age: 84
End: 2019-09-30
Payer: MEDICARE

## 2019-09-30 VITALS
RESPIRATION RATE: 20 BRPM | SYSTOLIC BLOOD PRESSURE: 146 MMHG | OXYGEN SATURATION: 93 % | WEIGHT: 226 LBS | DIASTOLIC BLOOD PRESSURE: 76 MMHG | HEART RATE: 55 BPM | HEIGHT: 64 IN | BODY MASS INDEX: 38.58 KG/M2

## 2019-09-30 DIAGNOSIS — G62.9 NEUROPATHY: ICD-10-CM

## 2019-09-30 DIAGNOSIS — J44.9 CHRONIC OBSTRUCTIVE PULMONARY DISEASE, UNSPECIFIED COPD TYPE: Primary | ICD-10-CM

## 2019-09-30 DIAGNOSIS — J30.0 CHRONIC VASOMOTOR RHINITIS: ICD-10-CM

## 2019-09-30 DIAGNOSIS — R53.1 GENERAL WEAKNESS: ICD-10-CM

## 2019-09-30 DIAGNOSIS — K44.9 DIAPHRAGMATIC HERNIA WITHOUT OBSTRUCTION AND WITHOUT GANGRENE: Chronic | ICD-10-CM

## 2019-09-30 DIAGNOSIS — R09.02 EXERCISE HYPOXEMIA: ICD-10-CM

## 2019-09-30 DIAGNOSIS — J44.9 CHRONIC OBSTRUCTIVE PULMONARY DISEASE, UNSPECIFIED COPD TYPE: ICD-10-CM

## 2019-09-30 DIAGNOSIS — R91.1 RIGHT LOWER LOBE PULMONARY NODULE: ICD-10-CM

## 2019-09-30 DIAGNOSIS — I51.89 DIASTOLIC DYSFUNCTION: ICD-10-CM

## 2019-09-30 PROCEDURE — 1101F PT FALLS ASSESS-DOCD LE1/YR: CPT | Mod: CPTII,S$GLB,, | Performed by: INTERNAL MEDICINE

## 2019-09-30 PROCEDURE — 94060 PR EVAL OF BRONCHOSPASM: ICD-10-PCS | Mod: S$GLB,,, | Performed by: INTERNAL MEDICINE

## 2019-09-30 PROCEDURE — 94726 PULM FUNCT TST PLETHYSMOGRAP: ICD-10-PCS | Mod: S$GLB,,, | Performed by: INTERNAL MEDICINE

## 2019-09-30 PROCEDURE — 94060 EVALUATION OF WHEEZING: CPT | Mod: S$GLB,,, | Performed by: INTERNAL MEDICINE

## 2019-09-30 PROCEDURE — 3078F DIAST BP <80 MM HG: CPT | Mod: CPTII,S$GLB,, | Performed by: INTERNAL MEDICINE

## 2019-09-30 PROCEDURE — 94729 PR C02/MEMBANE DIFFUSE CAPACITY: ICD-10-PCS | Mod: S$GLB,,, | Performed by: INTERNAL MEDICINE

## 2019-09-30 PROCEDURE — 94729 DIFFUSING CAPACITY: CPT | Mod: S$GLB,,, | Performed by: INTERNAL MEDICINE

## 2019-09-30 PROCEDURE — 99999 PR PBB SHADOW E&M-EST. PATIENT-LVL V: CPT | Mod: PBBFAC,,, | Performed by: INTERNAL MEDICINE

## 2019-09-30 PROCEDURE — 3077F PR MOST RECENT SYSTOLIC BLOOD PRESSURE >= 140 MM HG: ICD-10-PCS | Mod: CPTII,S$GLB,, | Performed by: INTERNAL MEDICINE

## 2019-09-30 PROCEDURE — 99215 OFFICE O/P EST HI 40 MIN: CPT | Mod: 25,S$GLB,, | Performed by: INTERNAL MEDICINE

## 2019-09-30 PROCEDURE — 3078F PR MOST RECENT DIASTOLIC BLOOD PRESSURE < 80 MM HG: ICD-10-PCS | Mod: CPTII,S$GLB,, | Performed by: INTERNAL MEDICINE

## 2019-09-30 PROCEDURE — 99499 RISK ADDL DX/OHS AUDIT: ICD-10-PCS | Mod: S$GLB,,, | Performed by: INTERNAL MEDICINE

## 2019-09-30 PROCEDURE — 99499 UNLISTED E&M SERVICE: CPT | Mod: S$GLB,,, | Performed by: INTERNAL MEDICINE

## 2019-09-30 PROCEDURE — 94726 PLETHYSMOGRAPHY LUNG VOLUMES: CPT | Mod: S$GLB,,, | Performed by: INTERNAL MEDICINE

## 2019-09-30 PROCEDURE — 1101F PR PT FALLS ASSESS DOC 0-1 FALLS W/OUT INJ PAST YR: ICD-10-PCS | Mod: CPTII,S$GLB,, | Performed by: INTERNAL MEDICINE

## 2019-09-30 PROCEDURE — 3077F SYST BP >= 140 MM HG: CPT | Mod: CPTII,S$GLB,, | Performed by: INTERNAL MEDICINE

## 2019-09-30 PROCEDURE — 99999 PR PBB SHADOW E&M-EST. PATIENT-LVL V: ICD-10-PCS | Mod: PBBFAC,,, | Performed by: INTERNAL MEDICINE

## 2019-09-30 PROCEDURE — 99215 PR OFFICE/OUTPT VISIT, EST, LEVL V, 40-54 MIN: ICD-10-PCS | Mod: 25,S$GLB,, | Performed by: INTERNAL MEDICINE

## 2019-09-30 RX ORDER — IPRATROPIUM BROMIDE AND ALBUTEROL SULFATE 2.5; .5 MG/3ML; MG/3ML
3 SOLUTION RESPIRATORY (INHALATION) EVERY 6 HOURS
Qty: 120 VIAL | Refills: 12 | Status: SHIPPED | OUTPATIENT
Start: 2019-09-30 | End: 2020-09-29

## 2019-09-30 RX ORDER — ALBUTEROL SULFATE 90 UG/1
2 AEROSOL, METERED RESPIRATORY (INHALATION) EVERY 6 HOURS
Qty: 1 INHALER | Refills: 12 | Status: SHIPPED | OUTPATIENT
Start: 2019-09-30 | End: 2020-09-29

## 2019-09-30 RX ORDER — IPRATROPIUM BROMIDE 42 UG/1
2 SPRAY, METERED NASAL 4 TIMES DAILY
Qty: 15 ML | Refills: 11 | Status: SHIPPED | OUTPATIENT
Start: 2019-09-30 | End: 2019-12-05 | Stop reason: CLARIF

## 2019-09-30 RX ORDER — FLUTICASONE FUROATE AND VILANTEROL 200; 25 UG/1; UG/1
1 POWDER RESPIRATORY (INHALATION) DAILY
Qty: 1 EACH | Refills: 11 | Status: SHIPPED | OUTPATIENT
Start: 2019-09-30 | End: 2019-12-05 | Stop reason: CLARIF

## 2019-09-30 NOTE — PROGRESS NOTES
Subjective:       Patient ID: Maldonado Deleon is a 84 y.o. female.    Chief Complaint: She       COPD    HPI       Problems with falling  Needs electric chair  Using portabel oxygen PRN    Dyspnea  Patient complains of shortness of breath. Symptoms occur while getting dressed, with one block walking - with walker.Symptoms began many years ago, gradually worsening since. Associated symptoms include  dyspnea on exertion, no cough and post nasal drip. She denies chest pain, located left chest. She does not have had recent travel. Weight has increased 10 pounds over last year. Symptoms are exacerbated by any exercise. Symptoms are alleviated by rest.       Past Medical History:   Diagnosis Date    Anticoagulant long-term use     Plavix    Atypical chest pain 8/26/2013    Back pain     CAD (coronary artery disease)     CAD in native artery 10/5/2015    Carotid artery disease without cerebral infarction 8/26/2013    Colon cancer     resection and chemo.    COPD (chronic obstructive pulmonary disease)     Depression     Diabetes mellitus type II     Diaphragmatic hernia without obstruction and without gangrene 8/13/2015    Emphysema of lung     Encounter for blood transfusion     Gallstone pancreatitis     History of PTCA 10/5/2015    Hyperlipidemia     Hypertension     Lymphocytic colitis     Meningioma     Neuropathy 8/2/2017    OA (osteoarthritis)     Obesity 8/26/2013    Oxygen dependent     q hs and prn     Past Surgical History:   Procedure Laterality Date    ABDOMINAL SURGERY      BACK SURGERY      x 2    CATARACT EXTRACTION      CHOLECYSTECTOMY      COLECTOMY      CORONARY ANGIOPLASTY      EYE SURGERY      JOINT REPLACEMENT Right     hip replacement x 4    PARATHYROIDECTOMY      SHOULDER SURGERY Right     TOTAL HIP ARTHROPLASTY Left     TOTAL KNEE ARTHROPLASTY Bilateral      Social History     Socioeconomic History    Marital status:      Spouse name: Not on file    Number  of children: Not on file    Years of education: Not on file    Highest education level: Not on file   Occupational History    Not on file   Social Needs    Financial resource strain: Not on file    Food insecurity:     Worry: Not on file     Inability: Not on file    Transportation needs:     Medical: Not on file     Non-medical: Not on file   Tobacco Use    Smoking status: Former Smoker     Packs/day: 2.50     Years: 35.00     Pack years: 87.50     Types: Cigarettes     Start date: 1952     Last attempt to quit: 1988     Years since quittin.7    Smokeless tobacco: Never Used   Substance and Sexual Activity    Alcohol use: No     Alcohol/week: 0.0 standard drinks    Drug use: No    Sexual activity: Never   Lifestyle    Physical activity:     Days per week: Not on file     Minutes per session: Not on file    Stress: Not on file   Relationships    Social connections:     Talks on phone: Not on file     Gets together: Not on file     Attends [employer]ical service: Not on file     Active member of club or organization: Not on file     Attends meetings of clubs or organizations: Not on file     Relationship status: Not on file   Other Topics Concern    Not on file   Social History Narrative    Not on file     Review of Systems   Constitutional: Positive for fatigue. Negative for fever.   HENT: Positive for postnasal drip, rhinorrhea and congestion.    Eyes: Negative for redness and itching.   Respiratory: Positive for cough, sputum production, shortness of breath, dyspnea on extertion, use of rescue inhaler and Paroxysmal Nocturnal Dyspnea.    Cardiovascular: Negative for chest pain, palpitations and leg swelling.   Genitourinary: Negative for difficulty urinating and hematuria.   Endocrine: Negative for cold intolerance and heat intolerance.    Skin: Negative for rash.   Gastrointestinal: Negative for nausea and abdominal pain.   Neurological: Negative for dizziness, syncope, weakness and  "light-headedness.   Hematological: Negative for adenopathy. Does not bruise/bleed easily.   Psychiatric/Behavioral: Negative for sleep disturbance. The patient is not nervous/anxious.        Objective:      BP (!) 146/76   Pulse (!) 55   Resp 20   Ht 5' 4.17" (1.63 m)   Wt 102.5 kg (225 lb 15.5 oz)   SpO2 (!) 93%   BMI 38.58 kg/m²   Physical Exam   Constitutional: She is oriented to person, place, and time. She appears well-developed and well-nourished.   HENT:   Head: Normocephalic and atraumatic.   Mouth/Throat: Oropharyngeal exudate present.   Eyes: Pupils are equal, round, and reactive to light. Conjunctivae are normal.   Neck: Neck supple. No JVD present. No tracheal deviation present. No thyromegaly present.   Cardiovascular: Normal rate, regular rhythm and normal heart sounds.   Pulmonary/Chest: Effort normal. No respiratory distress. She has decreased breath sounds. She has wheezes in the right lower field and the left lower field. She has no rhonchi. She has no rales. She exhibits no tenderness.   Abdominal: Soft. Bowel sounds are normal.   Musculoskeletal: Normal range of motion. She exhibits no edema.   Lymphadenopathy:     She has no cervical adenopathy.   Neurological: She is alert and oriented to person, place, and time.   Skin: Skin is warm and dry.   Nursing note and vitals reviewed.    Personal Diagnostic Review  Chest x-ray: stable  CT Head Without Contrast  Narrative: EXAMINATION:  CT HEAD WITHOUT CONTRAST    CLINICAL HISTORY:  Headache, chronic, new features or neuro deficit; Other headache syndrome    TECHNIQUE:  Low dose axial images were obtained through the head.  Coronal and sagittal reformations were also performed. Contrast was not administered.    COMPARISON:  07/30/2017    FINDINGS:  There is no evidence for hemorrhage, midline shift or mass effect.There is no gross evidence to suggest an acute infarct.There appear to be a couple of old bilateral basal ganglia infarcts.  There is " decreased attenuation within the deep and periventricular white matter bilaterally most consistent with small vessel ischemic changes in a patient of this age.  Mild generalized cortical atrophy noted.    Prominent mucosal thickening seen involving the left maxillary sinus.  This is favored to be chronic as there is also some associated thickening of the periosteum within the left maxillary sinus.  The appearance is also similar to the prior exam.  Vascular calcification seen involving the bilateral carotid siphons.    The calvarium is intact.  Impression: 1. No acute intracranial pathology.  2. Findings most consistent with chronic ischemic changes in a patient of this age.  3. Chronic left maxillary sinus disease.    Electronically signed by: Aditya Escobar DO  Date:    07/29/2019  Time:    14:42      Office Spirometry Results:     No flowsheet data found.  Pulmonary Studies Review 9/30/2019   SpO2 93   Ordering Provider -   Interpreting Provider -   Performing nurse/tech/RT -   Diagnosis -   Height 64.173   Weight 3615.54   BMI (Calculated) 38.7   Predicted Distance 158.45   Patient Race -   6MWT Status -   Patient Reported -   Was O2 used? -   Delivery Method -   Did patient stop? -   Type of assistive device(s) used? -   Is extra documentation required for this patient? -   Oxygen Saturation -   Supplemental Oxygen -   Heart Rate -   Blood Pressure -   [place] Dyspnea Rating  -   Oxygen Saturation -   Supplemental Oxygen -   Heart Rate -   Blood Pressure -   [place] Dyspnea Rating  -   Recovery Time (seconds) -   Oxygen Saturation -   Supplemental Oxygen -   Heart Rate -   Blood Pressure -   [place] Dyspnea Rating  -   Is procedure ready for interpretation? -   Did the patient stop or pause? -   Total Time Walked (Calculated) -   Total Laps Walked -   Final Partial Lap Distance (feet) -   Total Distance Feet (Calculated) -   Total Distance Meters (Calculated) -   Predicted Distance Meters (Calculated) 302.25    Percentage of Predicted (Calculated) -   Peak VO2 (Calculated) -   Mets -   Has The Patient Had a Previous Six Minute Walk Test? -   Oxygen Qualification? -   Oxygen Saturation -   Supplemental Oxygen -   Heart Rate -   Blood Pressure -   [place] Dyspnea Rating  -   Oxygen Saturation -   Supplemental Oxygen -   Heart Rate -   Blood Pressure -   [place] Dyspnea Rating  -   Recovery Time (seconds) -   Oxygen Saturation -   Supplemental Oxygen -   Heart Rate -   Blood Pressure -   [place] Dyspnea Rating  -         Assessment:       Chronic obstructive pulmonary disease, unspecified COPD type  -     albuterol (PROVENTIL/VENTOLIN HFA) 90 mcg/actuation inhaler; Inhale 2 puffs into the lungs every 6 (six) hours.  Dispense: 1 Inhaler; Refill: 12  -     albuterol-ipratropium (DUO-NEB) 2.5 mg-0.5 mg/3 mL nebulizer solution; Take 3 mLs by nebulization every 6 (six) hours.  Dispense: 120 vial; Refill: 12  -     fluticasone furoate-vilanterol (BREO ELLIPTA) 200-25 mcg/dose DsDv diskus inhaler; Inhale 1 puff into the lungs once daily. Controller  Dispense: 1 each; Refill: 11  -     X-Ray Chest 4 Or More View; Future; Expected date: 09/30/2019  -     Cancel: Spirometry without Bronchodilator; Future; Expected date: 09/30/2019  -     Stress test, pulmonary; Future; Expected date: 09/30/2019    Exercise hypoxemia  -     Stress test, pulmonary; Future; Expected date: 09/30/2019    General weakness  -     Ambulatory Referral to Physical/Occupational Therapy    Chronic vasomotor rhinitis  -     ipratropium (ATROVENT) 42 mcg (0.06 %) nasal spray; 2 sprays by Nasal route 4 (four) times daily. As needed for rhinitis  Dispense: 15 mL; Refill: 11    Neuropathy  -     Ambulatory Referral to Physical/Occupational Therapy    Diastolic dysfunction    Right lower lobe pulmonary nodule  -     X-Ray Chest 4 Or More View; Future; Expected date: 09/30/2019          Outpatient Encounter Medications as of 9/30/2019   Medication Sig Dispense Refill     acetaminophen (TYLENOL) 325 MG tablet Take 650 mg by mouth every 6 (six) hours as needed for Pain.      albuterol-ipratropium (DUO-NEB) 2.5 mg-0.5 mg/3 mL nebulizer solution Take 3 mLs by nebulization every 6 (six) hours. 120 vial 12    aspirin (ECOTRIN) 81 MG EC tablet Take 81 mg by mouth once daily.      atenolol (TENORMIN) 25 MG tablet Take 1 tablet (25 mg total) by mouth once daily. 90 tablet 3    benazepril (LOTENSIN) 20 MG tablet Take 1 tablet (20 mg total) by mouth 2 (two) times daily. 180 tablet 3    calcium carbonate (OS-RICHY) 600 mg (1,500 mg) Tab Take 600 mg by mouth once daily.       clopidogrel (PLAVIX) 75 mg tablet Take 1 tablet (75 mg total) by mouth once daily. 90 tablet 3    fluticasone furoate-vilanterol (BREO ELLIPTA) 200-25 mcg/dose DsDv diskus inhaler Inhale 1 puff into the lungs once daily. Controller 1 each 11    gabapentin (NEURONTIN) 300 MG capsule TAKE 2 CAPSULES BY MOUTH ONCE DAILY IN THE MORNING, TAKE  1  CAPSULE  AT  NOON,  AND TAKE 2  CAPSULES  AT  NIGHT 150 capsule 3    hydrALAZINE (APRESOLINE) 10 MG tablet Take 1 tablet (10 mg total) by mouth 3 (three) times daily. 90 tablet 11    HYDROcodone-acetaminophen (NORCO)  mg per tablet Take 1 tablet by mouth every 24 hours as needed for Pain. 60 tablet 0    ipratropium (ATROVENT) 42 mcg (0.06 %) nasal spray 2 sprays by Nasal route 4 (four) times daily. As needed for rhinitis 15 mL 11    isosorbide mononitrate (IMDUR) 60 MG 24 hr tablet TAKE 1 TABLET BY MOUTH TWICE DAILY 180 tablet 3    melatonin 10 mg Tab Take 1 tablet by mouth every evening.      metFORMIN (GLUCOPHAGE) 500 MG tablet TAKE 1 TABLET BY MOUTH TWICE DAILY WITH MEALS 180 tablet 2    milk thistle 175 mg tablet Take 175 mg by mouth once daily.      nebulizer accessories Kit Use with nebulizer 1 kit prn    nitroGLYCERIN (NITROSTAT) 0.4 MG SL tablet Place 1 tablet (0.4 mg total) under the tongue every 5 (five) minutes as needed for Chest pain. 60 tablet 12     rosuvastatin (CRESTOR) 20 MG tablet Take 1 tablet (20 mg total) by mouth every evening. 90 tablet 3    sertraline (ZOLOFT) 100 MG tablet Take 100 mg by mouth once daily.      sertraline (ZOLOFT) 50 MG tablet Take 1 tablet (50 mg total) by mouth once daily. 90 tablet 2    UNABLE TO FIND 1 tablet once daily. MULTIVIT-iron-FA-calcium-mins tab  9mg-iron-400mcg      [DISCONTINUED] albuterol-ipratropium (DUO-NEB) 2.5 mg-0.5 mg/3 mL nebulizer solution Take 3 mLs by nebulization every 6 (six) hours. 120 vial 12    [DISCONTINUED] fluticasone-vilanterol (BREO ELLIPTA) 200-25 mcg/dose DsDv diskus inhaler Inhale 1 puff into the lungs once daily. Controller 1 each 11    [DISCONTINUED] ipratropium (ATROVENT) 42 mcg (0.06 %) nasal spray 2 sprays by Nasal route 4 (four) times daily. As needed for rhinitis 15 mL 11    albuterol (PROVENTIL/VENTOLIN HFA) 90 mcg/actuation inhaler Inhale 2 puffs into the lungs every 6 (six) hours. 1 Inhaler 12    amlodipine (NORVASC) 2.5 MG tablet Take 1 tablet (2.5 mg total) by mouth once daily. 30 tablet 11    levocetirizine (XYZAL) 5 MG tablet TAKE ONE TABLET BY MOUTH IN THE EVENING (Patient not taking: Reported on 9/30/2019) 30 tablet 11    pregabalin (LYRICA) 50 MG capsule Take 1 capsule (50 mg total) by mouth 2 (two) times daily. 120 capsule 0    [DISCONTINUED] albuterol 90 mcg/actuation inhaler Inhale 2 puffs into the lungs every 6 (six) hours. 1 Inhaler 12     No facility-administered encounter medications on file as of 9/30/2019.      Plan:       Requested Prescriptions     Signed Prescriptions Disp Refills    albuterol (PROVENTIL/VENTOLIN HFA) 90 mcg/actuation inhaler 1 Inhaler 12     Sig: Inhale 2 puffs into the lungs every 6 (six) hours.    albuterol-ipratropium (DUO-NEB) 2.5 mg-0.5 mg/3 mL nebulizer solution 120 vial 12     Sig: Take 3 mLs by nebulization every 6 (six) hours.    fluticasone furoate-vilanterol (BREO ELLIPTA) 200-25 mcg/dose DsDv diskus inhaler 1 each 11      Sig: Inhale 1 puff into the lungs once daily. Controller    ipratropium (ATROVENT) 42 mcg (0.06 %) nasal spray 15 mL 11     Si sprays by Nasal route 4 (four) times daily. As needed for rhinitis     Problem List Items Addressed This Visit     Chronic obstructive pulmonary disease - Primary    Relevant Medications    albuterol (PROVENTIL/VENTOLIN HFA) 90 mcg/actuation inhaler    albuterol-ipratropium (DUO-NEB) 2.5 mg-0.5 mg/3 mL nebulizer solution    fluticasone furoate-vilanterol (BREO ELLIPTA) 200-25 mcg/dose DsDv diskus inhaler    Other Relevant Orders    X-Ray Chest 4 Or More View    Stress test, pulmonary    Diastolic dysfunction    General weakness    Relevant Orders    Ambulatory Referral to Physical/Occupational Therapy    Neuropathy    Overview     Will start on gabapentin 300mg tid and work-up to this dose. RTC in 1 months.         Relevant Orders    Ambulatory Referral to Physical/Occupational Therapy      Other Visit Diagnoses     Exercise hypoxemia        Relevant Orders    Stress test, pulmonary    Chronic vasomotor rhinitis        Relevant Medications    ipratropium (ATROVENT) 42 mcg (0.06 %) nasal spray    Right lower lobe pulmonary nodule        Relevant Orders    X-Ray Chest 4 Or More View             Follow up in about 6 months (around 3/30/2020) for Review CXR.    MEDICAL DECISION MAKING: Moderate to high complexity.  Overall, the multiple problems listed are of moderate to high severity that may impact quality of life and activities of daily living. Side effects of medications, treatment plan as well as options and alternatives reviewed and discussed with patient. There was counseling of patient concerning these issues.    Total time spent in face to face counseling and coordination of care - 45  minutes over 50% of time was used in discussion of prognosis, risks, benefits of treatment, instructions and compliance with regimen . Discussion with other physicians or health care providers (DME,  NP, pharmacy, respiratory therapy) occurred.

## 2019-10-01 LAB
BRPFT: ABNORMAL
DLCO ADJ PRE: 7.41 ML/(MIN*MMHG) (ref 13.9–25.37)
DLCO SINGLE BREATH LLN: 13.9
DLCO SINGLE BREATH PRE REF: 37.8 %
DLCO SINGLE BREATH REF: 19.63
DLCOC SBVA LLN: 2.55
DLCOC SBVA PRE REF: 81.4 %
DLCOC SBVA REF: 3.95
DLCOC SINGLE BREATH LLN: 13.9
DLCOC SINGLE BREATH PRE REF: 37.8 %
DLCOC SINGLE BREATH REF: 19.63
DLCOVA LLN: 2.55
DLCOVA PRE REF: 81.4 %
DLCOVA PRE: 3.22 ML/(MIN*MMHG*L) (ref 2.55–5.35)
DLCOVA REF: 3.95
DLVAADJ PRE: 3.22 ML/(MIN*MMHG*L) (ref 2.55–5.35)
ERV LLN: 0.47
ERV PRE REF: 99.8 %
ERV REF: 0.47
FEF 25 75 CHG: 27.2 %
FEF 25 75 LLN: 0.61
FEF 25 75 POST REF: 65.5 %
FEF 25 75 PRE REF: 51.5 %
FEF 25 75 REF: 1.5
FET100 CHG: -23.9 %
FEV1 CHG: 9.2 %
FEV1 FVC CHG: 11.4 %
FEV1 FVC LLN: 62
FEV1 FVC POST REF: 97 %
FEV1 FVC PRE REF: 87.1 %
FEV1 FVC REF: 77
FEV1 LLN: 1.31
FEV1 POST REF: 64.1 %
FEV1 PRE REF: 58.6 %
FEV1 REF: 1.89
FRCPLETH LLN: 1.91
FRCPLETH PREREF: 80.2 %
FRCPLETH REF: 2.73
FVC CHG: -1.9 %
FVC LLN: 1.73
FVC POST REF: 65 %
FVC PRE REF: 66.3 %
FVC REF: 2.49
IVC PRE: 1.48 L (ref 1.73–3.26)
IVC SINGLE BREATH LLN: 1.73
IVC SINGLE BREATH PRE REF: 59.4 %
IVC SINGLE BREATH REF: 2.49
MVV LLN: 61
MVV PRE REF: 48.9 %
MVV REF: 72
PEF CHG: 45.1 %
PEF LLN: 2.9
PEF POST REF: 44.1 %
PEF PRE REF: 30.4 %
PEF REF: 4.62
POST FEF 25 75: 0.98 L/S (ref 0.61–2.38)
POST FET 100: 6.33 SEC
POST FEV1 FVC: 74.48 % (ref 61.78–91.7)
POST FEV1: 1.21 L (ref 1.31–2.46)
POST FVC: 1.62 L (ref 1.73–3.26)
POST PEF: 2.04 L/S (ref 2.9–6.34)
PRE DLCO: 7.41 ML/(MIN*MMHG) (ref 13.9–25.37)
PRE ERV: 0.47 L (ref 0.47–0.47)
PRE FEF 25 75: 0.77 L/S (ref 0.61–2.38)
PRE FET 100: 8.31 SEC
PRE FEV1 FVC: 66.87 % (ref 61.78–91.7)
PRE FEV1: 1.11 L (ref 1.31–2.46)
PRE FRC PL: 2.19 L
PRE FVC: 1.65 L (ref 1.73–3.26)
PRE MVV: 35 L/MIN (ref 60.81–82.27)
PRE PEF: 1.4 L/S (ref 2.9–6.34)
PRE RV: 1.6 L (ref 1.69–2.84)
PRE TLC: 3.26 L (ref 3.98–5.96)
RAW LLN: 3.06
RAW PRE REF: 204.4 %
RAW PRE: 6.25 CMH2O*S/L (ref 3.06–3.06)
RAW REF: 3.06
RV LLN: 1.69
RV PRE REF: 70.8 %
RV REF: 2.26
RVTLC LLN: 37
RVTLC PRE REF: 104.8 %
RVTLC PRE: 49.08 % (ref 37.25–56.43)
RVTLC REF: 47
TLC LLN: 3.98
TLC PRE REF: 65.7 %
TLC REF: 4.97
VA PRE: 2.31 L (ref 4.82–4.82)
VA SINGLE BREATH LLN: 4.82
VA SINGLE BREATH PRE REF: 47.8 %
VA SINGLE BREATH REF: 4.82
VC LLN: 1.73
VC PRE REF: 66.6 %
VC PRE: 1.66 L (ref 1.73–3.26)
VC REF: 2.49
VTGRAWPRE: 2.57 L

## 2019-10-14 ENCOUNTER — HOSPITAL ENCOUNTER (OUTPATIENT)
Dept: RADIOLOGY | Facility: HOSPITAL | Age: 84
Discharge: HOME | End: 2019-10-14
Attending: INTERNAL MEDICINE
Payer: MEDICARE

## 2019-10-14 ENCOUNTER — CLINICAL SUPPORT (OUTPATIENT)
Dept: CARDIOLOGY | Facility: CLINIC | Age: 84
End: 2019-10-14
Attending: INTERNAL MEDICINE
Payer: MEDICARE

## 2019-10-14 DIAGNOSIS — R60.0 EDEMA OF BOTH LEGS: ICD-10-CM

## 2019-10-14 DIAGNOSIS — R94.31 ABNORMAL ECG: ICD-10-CM

## 2019-10-14 DIAGNOSIS — Z98.61 HISTORY OF PTCA: ICD-10-CM

## 2019-10-14 DIAGNOSIS — E66.01 CLASS 2 SEVERE OBESITY DUE TO EXCESS CALORIES WITH SERIOUS COMORBIDITY AND BODY MASS INDEX (BMI) OF 39.0 TO 39.9 IN ADULT: ICD-10-CM

## 2019-10-14 DIAGNOSIS — I20.9 AP (ANGINA PECTORIS): ICD-10-CM

## 2019-10-14 DIAGNOSIS — I25.10 CAD IN NATIVE ARTERY: ICD-10-CM

## 2019-10-14 DIAGNOSIS — E11.9 CONTROLLED TYPE 2 DIABETES MELLITUS WITHOUT COMPLICATION, WITHOUT LONG-TERM CURRENT USE OF INSULIN: ICD-10-CM

## 2019-10-14 DIAGNOSIS — I51.89 DIASTOLIC DYSFUNCTION: ICD-10-CM

## 2019-10-14 DIAGNOSIS — I65.23 ASYMPTOMATIC STENOSIS OF BOTH CAROTID ARTERIES WITHOUT INFARCTION: ICD-10-CM

## 2019-10-14 DIAGNOSIS — I10 ESSENTIAL HYPERTENSION: ICD-10-CM

## 2019-10-14 DIAGNOSIS — I77.9 CAROTID ARTERY DISEASE WITHOUT CEREBRAL INFARCTION: ICD-10-CM

## 2019-10-14 DIAGNOSIS — R53.1 GENERAL WEAKNESS: ICD-10-CM

## 2019-10-14 DIAGNOSIS — I77.1 STENOSIS OF RIGHT SUBCLAVIAN ARTERY: ICD-10-CM

## 2019-10-14 DIAGNOSIS — R42 DIZZINESS: ICD-10-CM

## 2019-10-14 DIAGNOSIS — E78.2 MIXED HYPERLIPIDEMIA: ICD-10-CM

## 2019-10-14 LAB
DIASTOLIC DYSFUNCTION: NO
DIASTOLIC DYSFUNCTION: YES
ESTIMATED PA SYSTOLIC PRESSURE: 40.7
MITRAL VALVE REGURGITATION: ABNORMAL
RETIRED EF AND QEF - SEE NOTES: 60 (ref 55–65)

## 2019-10-14 PROCEDURE — 78452 NM MULTI PHARM STRESS CARDIAC COMPONENT: ICD-10-PCS | Mod: 26,,, | Performed by: INTERNAL MEDICINE

## 2019-10-14 PROCEDURE — 93306 2D ECHO WITH COLOR FLOW DOPPLER: ICD-10-PCS | Mod: S$GLB,,, | Performed by: INTERNAL MEDICINE

## 2019-10-14 PROCEDURE — 93015 CV STRESS TEST SUPVJ I&R: CPT | Mod: S$GLB,,, | Performed by: INTERNAL MEDICINE

## 2019-10-14 PROCEDURE — 93015 NM MULTI PHARM STRESS CARDIAC COMPONENT: ICD-10-PCS | Mod: S$GLB,,, | Performed by: INTERNAL MEDICINE

## 2019-10-14 PROCEDURE — 78452 HT MUSCLE IMAGE SPECT MULT: CPT | Mod: 26,,, | Performed by: INTERNAL MEDICINE

## 2019-10-14 PROCEDURE — 93306 TTE W/DOPPLER COMPLETE: CPT | Mod: S$GLB,,, | Performed by: INTERNAL MEDICINE

## 2019-10-16 ENCOUNTER — TELEPHONE (OUTPATIENT)
Dept: CARDIOLOGY | Facility: HOSPITAL | Age: 84
End: 2019-10-16

## 2019-10-17 NOTE — TELEPHONE ENCOUNTER
I have attempted without success to contact this patient by phone to advise her that her stress test mildly abnormal as it has been in past.  Schedule fu appt with me at her earliest convenience to discuss results and treatment options.

## 2019-10-17 NOTE — TELEPHONE ENCOUNTER
Please call pt.   Stress test mildly abnormal as it has been in past.  Schedule fu appt with me at her earliest convenience to discuss results and treatment options.    Dr Taylor

## 2019-10-17 NOTE — TELEPHONE ENCOUNTER
Attempted to inform of response regarding blood thinner, no answer.  Left message to call office, number provided.

## 2019-10-21 ENCOUNTER — TELEPHONE (OUTPATIENT)
Dept: CARDIOLOGY | Facility: CLINIC | Age: 84
End: 2019-10-21

## 2019-10-21 ENCOUNTER — PATIENT MESSAGE (OUTPATIENT)
Dept: ENDOSCOPY | Facility: HOSPITAL | Age: 84
End: 2019-10-21

## 2019-10-21 NOTE — TELEPHONE ENCOUNTER
Attempted to speak with patient regarding procedure clearance, no answer.  Left message to call office and portal message sent.

## 2019-10-21 NOTE — TELEPHONE ENCOUNTER
Spoke with patient gave her abnormal stress test results and scheduled her a sooner appt to discuss results.

## 2019-10-22 ENCOUNTER — OFFICE VISIT (OUTPATIENT)
Dept: CARDIOLOGY | Facility: CLINIC | Age: 84
End: 2019-10-22
Payer: MEDICARE

## 2019-10-22 VITALS
SYSTOLIC BLOOD PRESSURE: 110 MMHG | HEART RATE: 55 BPM | DIASTOLIC BLOOD PRESSURE: 60 MMHG | BODY MASS INDEX: 38.17 KG/M2 | WEIGHT: 223.56 LBS

## 2019-10-22 DIAGNOSIS — I77.9 CAROTID ARTERY DISEASE WITHOUT CEREBRAL INFARCTION: ICD-10-CM

## 2019-10-22 DIAGNOSIS — I25.10 CAD IN NATIVE ARTERY: ICD-10-CM

## 2019-10-22 DIAGNOSIS — I10 ESSENTIAL HYPERTENSION: ICD-10-CM

## 2019-10-22 DIAGNOSIS — I65.23 ASYMPTOMATIC STENOSIS OF BOTH CAROTID ARTERIES WITHOUT INFARCTION: ICD-10-CM

## 2019-10-22 DIAGNOSIS — R06.09 DOE (DYSPNEA ON EXERTION): ICD-10-CM

## 2019-10-22 DIAGNOSIS — E78.2 MIXED HYPERLIPIDEMIA: ICD-10-CM

## 2019-10-22 DIAGNOSIS — I77.1 STENOSIS OF RIGHT SUBCLAVIAN ARTERY: ICD-10-CM

## 2019-10-22 DIAGNOSIS — E11.9 CONTROLLED TYPE 2 DIABETES MELLITUS WITHOUT COMPLICATION, WITHOUT LONG-TERM CURRENT USE OF INSULIN: ICD-10-CM

## 2019-10-22 DIAGNOSIS — R06.02 SOB (SHORTNESS OF BREATH): ICD-10-CM

## 2019-10-22 DIAGNOSIS — I51.89 DIASTOLIC DYSFUNCTION: ICD-10-CM

## 2019-10-22 DIAGNOSIS — I20.9 AP (ANGINA PECTORIS): ICD-10-CM

## 2019-10-22 DIAGNOSIS — R60.0 EDEMA OF BOTH LEGS: ICD-10-CM

## 2019-10-22 DIAGNOSIS — E66.01 CLASS 2 SEVERE OBESITY DUE TO EXCESS CALORIES WITH SERIOUS COMORBIDITY AND BODY MASS INDEX (BMI) OF 39.0 TO 39.9 IN ADULT: ICD-10-CM

## 2019-10-22 DIAGNOSIS — Z98.61 HISTORY OF PTCA: ICD-10-CM

## 2019-10-22 DIAGNOSIS — J44.9 CHRONIC OBSTRUCTIVE PULMONARY DISEASE, UNSPECIFIED COPD TYPE: ICD-10-CM

## 2019-10-22 DIAGNOSIS — R94.39 ABNORMAL STRESS TEST: Primary | ICD-10-CM

## 2019-10-22 PROCEDURE — 99999 PR PBB SHADOW E&M-EST. PATIENT-LVL IV: CPT | Mod: PBBFAC,,, | Performed by: INTERNAL MEDICINE

## 2019-10-22 PROCEDURE — 3078F DIAST BP <80 MM HG: CPT | Mod: CPTII,S$GLB,, | Performed by: INTERNAL MEDICINE

## 2019-10-22 PROCEDURE — 1101F PT FALLS ASSESS-DOCD LE1/YR: CPT | Mod: CPTII,S$GLB,, | Performed by: INTERNAL MEDICINE

## 2019-10-22 PROCEDURE — 99999 PR PBB SHADOW E&M-EST. PATIENT-LVL IV: ICD-10-PCS | Mod: PBBFAC,,, | Performed by: INTERNAL MEDICINE

## 2019-10-22 PROCEDURE — 99499 RISK ADDL DX/OHS AUDIT: ICD-10-PCS | Mod: S$GLB,,, | Performed by: INTERNAL MEDICINE

## 2019-10-22 PROCEDURE — 3078F PR MOST RECENT DIASTOLIC BLOOD PRESSURE < 80 MM HG: ICD-10-PCS | Mod: CPTII,S$GLB,, | Performed by: INTERNAL MEDICINE

## 2019-10-22 PROCEDURE — 1101F PR PT FALLS ASSESS DOC 0-1 FALLS W/OUT INJ PAST YR: ICD-10-PCS | Mod: CPTII,S$GLB,, | Performed by: INTERNAL MEDICINE

## 2019-10-22 PROCEDURE — 3074F SYST BP LT 130 MM HG: CPT | Mod: CPTII,S$GLB,, | Performed by: INTERNAL MEDICINE

## 2019-10-22 PROCEDURE — 3074F PR MOST RECENT SYSTOLIC BLOOD PRESSURE < 130 MM HG: ICD-10-PCS | Mod: CPTII,S$GLB,, | Performed by: INTERNAL MEDICINE

## 2019-10-22 PROCEDURE — 99215 OFFICE O/P EST HI 40 MIN: CPT | Mod: S$GLB,,, | Performed by: INTERNAL MEDICINE

## 2019-10-22 PROCEDURE — 99215 PR OFFICE/OUTPT VISIT, EST, LEVL V, 40-54 MIN: ICD-10-PCS | Mod: S$GLB,,, | Performed by: INTERNAL MEDICINE

## 2019-10-22 PROCEDURE — 99499 UNLISTED E&M SERVICE: CPT | Mod: S$GLB,,, | Performed by: INTERNAL MEDICINE

## 2019-10-22 NOTE — H&P (VIEW-ONLY)
Subjective:    Patient ID:  Maldonado Deleon is a 84 y.o. female who presents for evaluation of Hypertension; Hyperlipidemia; Coronary Artery Disease; Risk Factor Management For Atherosclerosis; Shortness of Breath; and Abnormal Stress Test        HPI  Pt presents for f/u.  Her current medical conditions include CAD, COPD, right subclavian stenosis, carotid artery disease, hypertension, hyperlipidemia, diabetes (former smoker). Nonsmoker.  Past hx pertinent for following:  On home O2 qhs.   S/p C Jan 2019 nonobstructive CAD up to 50% mid LAD/Diagonal bifurcation lesion.   s/p C 6/15 for anginal sxs and had PCI of calcified 90% mid-RCA stenosis with Diamondback atherectomy and LINDSAY x one. Her mid-LAD stenosis was overall stable in 50 - 60% range and medical mgt advised.  Carotid u/s 7/18 < 50% stenosis, and R VA retrograde flow as noted in past.  - stress MPI 2016.   Now here.  Echo 10/19 normal EF, DD, LVH, LAE, mild MR, mild PHTN 41 mmHg.  Stress MPI 10/19 mild anterior ischemia, normal EF.  Mild anterior ischemia also noted on 2014 nuclear stress test.  Pt on multiple medical tx for CAD/angina.  Has chronic angina.  Took sl ntg within last week.  Usually takes 2 sl ntg to alleviate cp. sxs can be at night.  Sl ntg use is sporadic but seems to have increased over last year.  Has chronic COTTER, unchanged but significant.  Uses home O2.  No pnd/orthopnea.  Chronic leg edema.  HTN variable, controlled.  Compliant with meds.  No TIA/CVA sxs.  Has recently seen Pulmonary for COPD tx.  LDL well controlled, on statin.      Current Outpatient Medications:     acetaminophen (TYLENOL) 325 MG tablet, Take 650 mg by mouth every 6 (six) hours as needed for Pain., Disp: , Rfl:     albuterol (PROVENTIL/VENTOLIN HFA) 90 mcg/actuation inhaler, Inhale 2 puffs into the lungs every 6 (six) hours., Disp: 1 Inhaler, Rfl: 12    albuterol-ipratropium (DUO-NEB) 2.5 mg-0.5 mg/3 mL nebulizer solution, Take 3 mLs by nebulization every 6  (six) hours., Disp: 120 vial, Rfl: 12    amlodipine (NORVASC) 2.5 MG tablet, Take 1 tablet (2.5 mg total) by mouth once daily., Disp: 30 tablet, Rfl: 11    aspirin (ECOTRIN) 81 MG EC tablet, Take 81 mg by mouth once daily., Disp: , Rfl:     atenolol (TENORMIN) 25 MG tablet, Take 1 tablet (25 mg total) by mouth once daily., Disp: 90 tablet, Rfl: 3    benazepril (LOTENSIN) 20 MG tablet, Take 1 tablet (20 mg total) by mouth 2 (two) times daily., Disp: 180 tablet, Rfl: 3    calcium carbonate (OS-RICHY) 600 mg (1,500 mg) Tab, Take 600 mg by mouth once daily. , Disp: , Rfl:     clopidogrel (PLAVIX) 75 mg tablet, Take 1 tablet (75 mg total) by mouth once daily., Disp: 90 tablet, Rfl: 3    fluticasone furoate-vilanterol (BREO ELLIPTA) 200-25 mcg/dose DsDv diskus inhaler, Inhale 1 puff into the lungs once daily. Controller, Disp: 1 each, Rfl: 11    gabapentin (NEURONTIN) 300 MG capsule, TAKE 2 CAPSULES BY MOUTH ONCE DAILY IN THE MORNING, TAKE  1  CAPSULE  AT  NOON,  AND TAKE 2  CAPSULES  AT  NIGHT, Disp: 150 capsule, Rfl: 3    hydrALAZINE (APRESOLINE) 10 MG tablet, Take 1 tablet (10 mg total) by mouth 3 (three) times daily., Disp: 90 tablet, Rfl: 11    HYDROcodone-acetaminophen (NORCO)  mg per tablet, Take 1 tablet by mouth every 24 hours as needed for Pain., Disp: 60 tablet, Rfl: 0    ipratropium (ATROVENT) 42 mcg (0.06 %) nasal spray, 2 sprays by Nasal route 4 (four) times daily. As needed for rhinitis, Disp: 15 mL, Rfl: 11    isosorbide mononitrate (IMDUR) 60 MG 24 hr tablet, TAKE 1 TABLET BY MOUTH TWICE DAILY, Disp: 180 tablet, Rfl: 3    melatonin 10 mg Tab, Take 1 tablet by mouth every evening., Disp: , Rfl:     metFORMIN (GLUCOPHAGE) 500 MG tablet, TAKE 1 TABLET BY MOUTH TWICE DAILY WITH MEALS, Disp: 180 tablet, Rfl: 2    milk thistle 175 mg tablet, Take 175 mg by mouth once daily., Disp: , Rfl:     nebulizer accessories Kit, Use with nebulizer, Disp: 1 kit, Rfl: prn    nitroGLYCERIN (NITROSTAT)  0.4 MG SL tablet, Place 1 tablet (0.4 mg total) under the tongue every 5 (five) minutes as needed for Chest pain., Disp: 60 tablet, Rfl: 12    rosuvastatin (CRESTOR) 20 MG tablet, Take 1 tablet (20 mg total) by mouth every evening., Disp: 90 tablet, Rfl: 3    sertraline (ZOLOFT) 100 MG tablet, Take 100 mg by mouth once daily., Disp: , Rfl:     sertraline (ZOLOFT) 50 MG tablet, Take 1 tablet (50 mg total) by mouth once daily., Disp: 90 tablet, Rfl: 2    levocetirizine (XYZAL) 5 MG tablet, TAKE ONE TABLET BY MOUTH IN THE EVENING (Patient not taking: Reported on 9/30/2019), Disp: 30 tablet, Rfl: 11    pregabalin (LYRICA) 50 MG capsule, Take 1 capsule (50 mg total) by mouth 2 (two) times daily. (Patient not taking: Reported on 10/22/2019), Disp: 120 capsule, Rfl: 0    UNABLE TO FIND, 1 tablet once daily. MULTIVIT-iron-FA-calcium-mins tab 9mg-iron-400mcg, Disp: , Rfl:       Review of Systems   Constitution: Negative.   HENT: Negative.    Eyes: Negative.    Cardiovascular: Positive for chest pain, dyspnea on exertion and leg swelling.   Respiratory: Positive for shortness of breath.    Endocrine: Negative.    Hematologic/Lymphatic: Negative.    Skin: Negative.    Musculoskeletal: Positive for arthritis and joint pain.   Gastrointestinal: Negative.    Genitourinary: Negative.    Neurological: Positive for dizziness, light-headedness and weakness.   Psychiatric/Behavioral: Negative.    Allergic/Immunologic: Negative.        /60 (BP Location: Left arm, Patient Position: Sitting, BP Method: Medium (Manual))   Pulse (!) 55   Wt 101.4 kg (223 lb 8.7 oz)   BMI 38.17 kg/m²     Wt Readings from Last 3 Encounters:   10/22/19 101.4 kg (223 lb 8.7 oz)   09/30/19 102.5 kg (225 lb 15.5 oz)   09/18/19 101.6 kg (223 lb 15.8 oz)     Temp Readings from Last 3 Encounters:   09/18/19 99.5 °F (37.5 °C) (Tympanic)   07/23/19 98.2 °F (36.8 °C) (Tympanic)   06/28/19 98.5 °F (36.9 °C) (Tympanic)     BP Readings from Last 3  Encounters:   10/22/19 110/60   09/30/19 (!) 146/76   09/23/19 (!) 152/84     Pulse Readings from Last 3 Encounters:   10/22/19 (!) 55   09/30/19 (!) 55   09/23/19 (!) 58          Objective:    Physical Exam   Constitutional: She is oriented to person, place, and time. Vital signs are normal. She appears well-developed and well-nourished. She is active and cooperative. She does not have a sickly appearance. She does not appear ill. No distress.   HENT:   Head: Normocephalic.   Neck: Neck supple. Normal carotid pulses, no hepatojugular reflux and no JVD present. Carotid bruit is not present. No thyromegaly present.   Cardiovascular: Normal rate, regular rhythm, S1 normal, S2 normal and normal pulses. PMI is not displaced. Exam reveals no gallop and no friction rub.   Murmur heard.   Medium-pitched midsystolic murmur is present with a grade of 1/6 at the upper left sternal border.  Pulses:       Radial pulses are 2+ on the right side, and 2+ on the left side.   Pulmonary/Chest: Effort normal and breath sounds normal. She has no wheezes. She has no rales.   Abdominal: Soft. Normal appearance, normal aorta and bowel sounds are normal. She exhibits no pulsatile liver, no abdominal bruit, no ascites and no mass. There is no splenomegaly or hepatomegaly. There is no tenderness.   Musculoskeletal: She exhibits edema.   Lymphadenopathy:     She has no cervical adenopathy.   Neurological: She is alert and oriented to person, place, and time.   Skin: Skin is warm. She is not diaphoretic.   Psychiatric: She has a normal mood and affect. Her behavior is normal.   Nursing note and vitals reviewed.      I have reviewed all pertinent labs and cardiac studies.      Chemistry        Component Value Date/Time     09/24/2019 0819    K 4.5 09/24/2019 0819     09/24/2019 0819    CO2 31 (H) 09/24/2019 0819    BUN 27 (H) 09/24/2019 0819    CREATININE 0.8 09/24/2019 0819    GLU 82 09/24/2019 0819        Component Value Date/Time     CALCIUM 9.8 09/24/2019 0819    ALKPHOS 50 (L) 09/24/2019 0819    AST 25 09/24/2019 0819    ALT 16 09/24/2019 0819    BILITOT 0.4 09/24/2019 0819    ESTGFRAFRICA >60.0 09/24/2019 0819    EGFRNONAA >60.0 09/24/2019 0819        Lab Results   Component Value Date    WBC 9.16 08/20/2018    HGB 12.7 08/20/2018    HCT 41.1 08/20/2018    MCV 93 08/20/2018     08/20/2018       Lab Results   Component Value Date    HGBA1C 5.7 (H) 09/24/2019     Lab Results   Component Value Date    CHOL 136 09/24/2019    CHOL 134 07/18/2018    CHOL 192 01/16/2018     Lab Results   Component Value Date    HDL 66 09/24/2019    HDL 61 07/18/2018    HDL 61 01/16/2018     Lab Results   Component Value Date    LDLCALC 43.6 (L) 09/24/2019    LDLCALC 51.8 (L) 07/18/2018    LDLCALC 108.0 01/16/2018     Lab Results   Component Value Date    TRIG 132 09/24/2019    TRIG 106 07/18/2018    TRIG 115 01/16/2018     Lab Results   Component Value Date    CHOLHDL 48.5 09/24/2019    CHOLHDL 45.5 07/18/2018    CHOLHDL 31.8 01/16/2018        Narrative     Date of Procedure: 10/14/2019        TEST DESCRIPTION   Technical Quality: This is a technically adequate study.     Aorta: The aortic root is normal in size, measuring 2.8 cm at sinotubular junction and 3.0 cm at Sinuses of Valsalva. The proximal ascending aorta is normal in size, measuring 3.5 cm across.     Left Atrium: The left atrial volume index is mildly enlarged, measuring 38.42 cc/m2.     Left Ventricle: The left ventricle is normal in size, with an end-diastolic diameter of 4.1 cm, and an end-systolic diameter of 2.9 cm. LV wall thickness is normal, with the septum measuring 1.8 cm and the posterior wall measuring 1.3 cm across. Relative   wall thickness was increased at 0.63, and the LV mass index was increased at 147.0 g/m2 consistent with concentric left ventricular hypertrophy. There are no regional wall motion abnormalities. Left ventricular systolic function appears normal.  Visually   estimated ejection fraction is 60-65%. The LV Doppler derived stroke volume equals 100.0 ccs.     Diastolic indices: E wave velocity 0.9 m/s, E/A ratio 1.1,  msec., E/e' ratio(avg) 19. There is pseudonormalization of mitral inflow pattern consistent with significant diastolic dysfunction.     Right Atrium: The right atrium is normal in size, measuring 5.2 cm in length and 2.7 cm in width in the apical view.     Right Ventricle: The right ventricle is normal in size measuring 2.9 cm at the base in the apical right ventricle-focused view. Global right ventricular systolic function appears normal. The estimated PA systolic pressure is 41 mmHg.     Aortic Valve:  The aortic valve is normal in structure. The aortic valve is tri-leaflet in structure.     Mitral Valve:  The mitral valve is normal in structure. There is mild mitral regurgitation.     Tricuspid Valve:  The tricuspid valve is normal in structure.     Pulmonary Valve:  The pulmonic valve is not well seen. There is mild pulmonic regurgitation.     IVC: IVC is normal in size and collapses > 50% with a sniff, suggesting normal right atrial pressure of 3 mmHg.     Intracavitary: There is no evidence of pericardial effusion, intracavity mass, thrombi, or vegetation.         CONCLUSIONS     1 - Mild left atrial enlargement.     2 - Concentric hypertrophy.     3 - No wall motion abnormalities.     4 - Normal left ventricular systolic function (EF 60-65%).     5 - Impaired LV relaxation, elevated LAP (grade 2 diastolic dysfunction).     6 - Normal right ventricular systolic function .     7 - Pulmonary hypertension. The estimated PA systolic pressure is 41 mmHg.     8 - Mild mitral regurgitation.             This document has been electronically    SIGNED BY: Castillo Oshea MD On: 10/14/2019 17:33     Narrative     Date of Procedure: 10/14/2019    PRE-TEST DATA   EKG: EKG demonstrates adequate. Resting electrocardiogram reveals normal sinus rhythm with 1*  AV block at a rate of 66 bpm.     TEST DESCRIPTION   The patient received 0.4 mg of Regadenoson as an IV bolus. Peak heart rate was 78 bpm, which is 58% of the age predicted maximum heart rate. .     EKG Conclusions:    1. The EKG portion of this study is negative for ischemia at a peak heart rate of 78 bpm (58% of predicted).   2. Blood pressure was elevated during the protocol  (Presenting BP: 218/96 Peak BP: 164/96).   3. No significant arrhythmias were present.   4. There were no symptoms of chest discomfort or significant dyspnea throughout the protocol.     Nuclear Procedure:  Following a single isotope protocol, 9.8 mCi of Tc99 labeled Tetrofosmin was given at rest and tomographic imaging was performed. Regadenoson pharmacologic stress testing was performed as described above. Immediately following the IV bolus of   regadenoson, 27.1 mCi of Tc99 labeled Tetrofosmin was given and tomographic imaging was performed. The site of the IV injection was the left hand.     Comments:  This is a technically adequate study. Inspection of the transaxial images demonstrated no significant cranial, caudal, or lateral patient motion in the camera between rest and stress acquisitions. On stress images, there is mild decreased uptake of   radiotracer in the anterior wall of the left ventricle which reverses on resting images suggestive of ischemia. The remainder of the myocardium demonstrates normal radiotracer uptake. The extracardiac distribution of radioactivity is normal. The left   ventricular cavity is normal in size and does not increase with stress. On gated SPECT, left ventricular motion is normal at rest.     Nuclear Quantitative Functional Analysis:   LVEF: 61 %    Impression: ABNORMAL MYOCARDIAL PERFUSION  1. There is evidence for mild myocardial ischemia in the anterior wall of the left ventricle.   2. The perfusion scan is free of evidence for myocardial injury.   3. Resting wall motion is physiologic.   4.  Resting LV function is normal.   5. The ventricular volumes are normal at rest and stress.   6. The extracardiac distribution of radioactivity is normal.           This document has been electronically    SIGNED BY: Kurt Taylor MD On: 10/14/2019 17:32           Assessment:       1. Abnormal stress test    2. AP (angina pectoris)    3. Stenosis of right subclavian artery    4. Mixed hyperlipidemia    5. History of PTCA    6. Essential hypertension    7. Edema of both legs    8. Diastolic dysfunction    9. Controlled type 2 diabetes mellitus without complication, without long-term current use of insulin    10. Class 2 severe obesity due to excess calories with serious comorbidity and body mass index (BMI) of 39.0 to 39.9 in adult    11. Chronic obstructive pulmonary disease, unspecified COPD type    12. Asymptomatic stenosis of both carotid arteries without infarction    13. CAD in native artery    14. Carotid artery disease without cerebral infarction    15. COTTER (dyspnea on exertion)         Plan:             + anterior ischemia on nuclear stress MPI.  + anginal sxs requiring sl ntg for breakthrough angina despite multiple medical tx for CAD/angina.  Discussed options of repeat LHC to reassess her mid-LAD stenosis and assess for progression of her CAD.  Pros/cons of repeat LHC vs observation discussed.  Decision made to move on with repeat LHC.   Stable other CV conditions.  Continue current medical tx.  Cardiac diet.  Weight loss.  Continue COPD tx.  Keep DM HGAIC well controlled.     Pt advised to undergo left heart catheterization with possible PCI if warranted.  Pt advised of all risks and benefits of procedure.  Pt advised of alternative approaches and treatment strategies to include medical therapy, PCI as well as surgical revascularization with possible CABG.  Possible use of atherectomy for CAD, to include possible use of temporary pacemaker discussed. All questions answered in clinic.    Left radial  approach.    Procedure scheduled Wed Nov 20, 0830 am.

## 2019-10-23 ENCOUNTER — TELEPHONE (OUTPATIENT)
Dept: ENDOSCOPY | Facility: HOSPITAL | Age: 84
End: 2019-10-23

## 2019-10-23 NOTE — TELEPHONE ENCOUNTER
Retrieved [place] Voicemail. Left patient a message to call our office , call back number provided.

## 2019-10-25 RX ORDER — SERTRALINE HYDROCHLORIDE 50 MG/1
TABLET, FILM COATED ORAL
Qty: 90 TABLET | Refills: 2 | Status: SHIPPED | OUTPATIENT
Start: 2019-10-25

## 2019-10-25 NOTE — TELEPHONE ENCOUNTER
Attempted to speak with patient regarding blood thinner response.  No answer, left message to call office, number provided.

## 2019-10-29 ENCOUNTER — TELEPHONE (OUTPATIENT)
Dept: ENDOSCOPY | Facility: HOSPITAL | Age: 84
End: 2019-10-29

## 2019-10-29 NOTE — TELEPHONE ENCOUNTER
----- Message from Jayleen Benavidez sent at 10/29/2019 11:56 AM CDT -----  Contact: pt  Patient stated that she has to cancel colonoscopy on 11/18/19 due to heart provider will not allow her to get of medication. She can be reached at [phone]        Thanks,  Jayleen Benavidez

## 2019-10-29 NOTE — TELEPHONE ENCOUNTER
Left patient a message to call our office in regards to canceling endoscopy procedure, call back number provided to confirm. Procedure cancelled.

## 2019-11-05 NOTE — TELEPHONE ENCOUNTER
Informed patient of response regarding blood thinner notice.  Patient stated she cancelled procedure d/t having cardiac issues and will call back next year to reschedule.  Procedure removed from 11- schedule with dr william.

## 2019-11-13 ENCOUNTER — LAB VISIT (OUTPATIENT)
Dept: LAB | Facility: HOSPITAL | Age: 84
End: 2019-11-13
Attending: INTERNAL MEDICINE
Payer: MEDICARE

## 2019-11-13 DIAGNOSIS — I77.9 CAROTID ARTERY DISEASE WITHOUT CEREBRAL INFARCTION: ICD-10-CM

## 2019-11-13 DIAGNOSIS — R06.02 SOB (SHORTNESS OF BREATH): ICD-10-CM

## 2019-11-13 DIAGNOSIS — I20.9 AP (ANGINA PECTORIS): ICD-10-CM

## 2019-11-13 DIAGNOSIS — R06.09 DOE (DYSPNEA ON EXERTION): ICD-10-CM

## 2019-11-13 DIAGNOSIS — R94.39 ABNORMAL STRESS TEST: ICD-10-CM

## 2019-11-13 LAB
APTT BLDCRRT: 23.9 SEC (ref 21–32)
BASOPHILS # BLD AUTO: 0.07 K/UL (ref 0–0.2)
BASOPHILS NFR BLD: 0.9 % (ref 0–1.9)
DIFFERENTIAL METHOD: ABNORMAL
EOSINOPHIL # BLD AUTO: 0.1 K/UL (ref 0–0.5)
EOSINOPHIL NFR BLD: 1.6 % (ref 0–8)
ERYTHROCYTE [DISTWIDTH] IN BLOOD BY AUTOMATED COUNT: 14.1 % (ref 11.5–14.5)
HCT VFR BLD AUTO: 42.2 % (ref 37–48.5)
HGB BLD-MCNC: 13.3 G/DL (ref 12–16)
IMM GRANULOCYTES # BLD AUTO: 0.03 K/UL (ref 0–0.04)
IMM GRANULOCYTES NFR BLD AUTO: 0.4 % (ref 0–0.5)
INR PPP: 0.9 (ref 0.8–1.2)
LYMPHOCYTES # BLD AUTO: 1.2 K/UL (ref 1–4.8)
LYMPHOCYTES NFR BLD: 14.7 % (ref 18–48)
MCH RBC QN AUTO: 29.6 PG (ref 27–31)
MCHC RBC AUTO-ENTMCNC: 31.5 G/DL (ref 32–36)
MCV RBC AUTO: 94 FL (ref 82–98)
MONOCYTES # BLD AUTO: 0.6 K/UL (ref 0.3–1)
MONOCYTES NFR BLD: 7.6 % (ref 4–15)
NEUTROPHILS # BLD AUTO: 6.1 K/UL (ref 1.8–7.7)
NEUTROPHILS NFR BLD: 74.8 % (ref 38–73)
NRBC BLD-RTO: 0 /100 WBC
PLATELET # BLD AUTO: 173 K/UL (ref 150–350)
PMV BLD AUTO: 11.5 FL (ref 9.2–12.9)
PROTHROMBIN TIME: 9.9 SEC (ref 9–12.5)
RBC # BLD AUTO: 4.5 M/UL (ref 4–5.4)
WBC # BLD AUTO: 8.17 K/UL (ref 3.9–12.7)

## 2019-11-13 PROCEDURE — 85025 COMPLETE CBC W/AUTO DIFF WBC: CPT

## 2019-11-13 PROCEDURE — 80048 BASIC METABOLIC PNL TOTAL CA: CPT

## 2019-11-13 PROCEDURE — 36415 COLL VENOUS BLD VENIPUNCTURE: CPT | Mod: PO

## 2019-11-13 PROCEDURE — 85730 THROMBOPLASTIN TIME PARTIAL: CPT

## 2019-11-13 PROCEDURE — 85610 PROTHROMBIN TIME: CPT

## 2019-11-14 LAB
ANION GAP SERPL CALC-SCNC: 9 MMOL/L (ref 8–16)
BUN SERPL-MCNC: 20 MG/DL (ref 8–23)
CALCIUM SERPL-MCNC: 9.7 MG/DL (ref 8.7–10.5)
CHLORIDE SERPL-SCNC: 103 MMOL/L (ref 95–110)
CO2 SERPL-SCNC: 30 MMOL/L (ref 23–29)
CREAT SERPL-MCNC: 0.7 MG/DL (ref 0.5–1.4)
EST. GFR  (AFRICAN AMERICAN): >60 ML/MIN/1.73 M^2
EST. GFR  (NON AFRICAN AMERICAN): >60 ML/MIN/1.73 M^2
GLUCOSE SERPL-MCNC: 93 MG/DL (ref 70–110)
POTASSIUM SERPL-SCNC: 4.4 MMOL/L (ref 3.5–5.1)
SODIUM SERPL-SCNC: 142 MMOL/L (ref 136–145)

## 2019-11-20 ENCOUNTER — HOSPITAL ENCOUNTER (OUTPATIENT)
Facility: HOSPITAL | Age: 84
Discharge: HOME | End: 2019-11-20
Attending: INTERNAL MEDICINE | Admitting: INTERNAL MEDICINE
Payer: MEDICARE

## 2019-11-20 VITALS
RESPIRATION RATE: 19 BRPM | TEMPERATURE: 98 F | WEIGHT: 223 LBS | SYSTOLIC BLOOD PRESSURE: 105 MMHG | HEIGHT: 64 IN | OXYGEN SATURATION: 95 % | DIASTOLIC BLOOD PRESSURE: 71 MMHG | BODY MASS INDEX: 38.07 KG/M2 | HEART RATE: 53 BPM

## 2019-11-20 DIAGNOSIS — R94.39 ABNORMAL STRESS TEST: ICD-10-CM

## 2019-11-20 DIAGNOSIS — I25.10 ATHEROSCLEROTIC HEART DISEASE OF NATIVE CORONARY ARTERY WITHOUT ANGINA PECTORIS: ICD-10-CM

## 2019-11-20 DIAGNOSIS — R94.39 ABNORMAL NUCLEAR STRESS TEST: ICD-10-CM

## 2019-11-20 PROCEDURE — 99152 CATH LAB PROCEDURE: ICD-10-PCS | Mod: ,,, | Performed by: INTERNAL MEDICINE

## 2019-11-20 PROCEDURE — 25000003 PHARM REV CODE 250

## 2019-11-20 PROCEDURE — 93458 CATH LAB PROCEDURE: ICD-10-PCS | Mod: 26,,, | Performed by: INTERNAL MEDICINE

## 2019-11-20 PROCEDURE — 93458 L HRT ARTERY/VENTRICLE ANGIO: CPT | Mod: 26,,, | Performed by: INTERNAL MEDICINE

## 2019-11-20 PROCEDURE — 63600175 PHARM REV CODE 636 W HCPCS

## 2019-11-20 PROCEDURE — C1887 CATHETER, GUIDING: HCPCS

## 2019-11-20 PROCEDURE — C1769 GUIDE WIRE: HCPCS

## 2019-11-20 PROCEDURE — 99152 MOD SED SAME PHYS/QHP 5/>YRS: CPT | Mod: ,,, | Performed by: INTERNAL MEDICINE

## 2019-11-20 RX ORDER — RANOLAZINE 500 MG/1
500 TABLET, EXTENDED RELEASE ORAL 2 TIMES DAILY
Qty: 60 TABLET | Refills: 11 | Status: SHIPPED | OUTPATIENT
Start: 2019-11-20 | End: 2020-11-19

## 2019-11-20 RX ORDER — DIPHENHYDRAMINE HCL 50 MG
50 CAPSULE ORAL ONCE
Status: COMPLETED | OUTPATIENT
Start: 2019-11-20 | End: 2019-11-20

## 2019-11-20 RX ORDER — SODIUM CHLORIDE 9 MG/ML
INJECTION, SOLUTION INTRAVENOUS CONTINUOUS
Status: DISCONTINUED | OUTPATIENT
Start: 2019-11-20 | End: 2019-11-20 | Stop reason: HOSPADM

## 2019-11-20 RX ORDER — SODIUM CHLORIDE 9 MG/ML
INJECTION, SOLUTION INTRAVENOUS CONTINUOUS
Status: ACTIVE | OUTPATIENT
Start: 2019-11-20 | End: 2019-11-20

## 2019-11-20 RX ORDER — DIAZEPAM 5 MG/1
5 TABLET ORAL
Status: COMPLETED | OUTPATIENT
Start: 2019-11-20 | End: 2019-11-20

## 2019-11-20 RX ORDER — ASPIRIN 325 MG
325 TABLET ORAL ONCE
Status: COMPLETED | OUTPATIENT
Start: 2019-11-20 | End: 2019-11-20

## 2019-11-20 RX ADMIN — Medication 50 MG: at 07:11

## 2019-11-20 RX ADMIN — DIAZEPAM 5 MG: 5 TABLET ORAL at 07:11

## 2019-11-20 RX ADMIN — Medication 325 MG: at 07:11

## 2019-11-20 RX ADMIN — SODIUM CHLORIDE: 9 INJECTION, SOLUTION INTRAVENOUS at 07:11

## 2019-11-20 NOTE — PLAN OF CARE
1600 DISCHARGE INSTRUCTIONS REVIEWED WITH ABDIAZIZ AND COPY GIVEN. IV REMOVED.  DRESSED AND AWAITING RIDE HOME.  L WRIST SITE INTACT. ARMBOARD IN PLACE. 1700 DISCHARGED HOME.  TO EXIT VIA W/C

## 2019-11-20 NOTE — OP NOTE
[facility] -   Cardiac Catheterization  Procedure + Discharge Note    SUMMARY     Maldonado Deleon  [id number]  Bryson Nogueira MD    Date of Procedure: 11/20/2019    Procedure:  1. LHC  2. LEFT VENTRICULOGRAM 3. CORONARY ANGIOGRAM    Provider: Kurt Taylor MD    Assisting Provider:  Luis Estes    Indications: She was referred for cardiac catheterization to further evaluate angina + abnormal stress test.    Pre-Procedure Diagnosis:  Angina pectoris, chronic CAD, Abnormal nuclear stress test.    Post-Procedure Diagnosis:   Mid LAD/Diag bifurcation stenosis    Anesthesia: RN IV Sedation    Description of the Findings of the Procedure:     The risks, benefits, complications, treatment options, and expected outcomes were discussed with the patient. The patient and/or family concurred with the proposed plan, giving informed consent. Patient was brought to the cath lab after IV hydration was begun and oral premedication was given.    Findings:    60 - 70% calcified prox/mid LAD bifurcation stenosis w D2.  Patent RCA stent  LVEF 55%  Left radial TR band  See report for full details    Complications: None; patient tolerated the procedure well.    Estimated Blood Loss (EBL): Minimal           Implants: none    Specimens: none    Condition: stable    Disposition: PACU - hemodynamically stable.    Attestation: I was present and scrubbed for the entire procedure.     Recommendations:    Usual post cath care  D/c pt home.  Cardiac/diabetic diet.  Add Ranexa 500 mg bid.  Optimize med tx for CAD.  Continue current meds.  F/u clinic and discuss further treatment options (medical tx, revascularization options) for CAD.

## 2019-11-20 NOTE — NURSING
1509 recovery complete.  Removed from moniter.  Ambulated to bathroom using wheelchair for support.  Waiting for family member to come pick her up.

## 2019-11-20 NOTE — DISCHARGE INSTRUCTIONS
"Post-op Heart Catheterization    1. DIET: It is advisable for you to follow a diet that limits the intake of salt, sugar, saturated fats and cholesterol.     2. DRIVING: Due to sedation you received during your procedure, DO NOT drive or operate machinery for 24 hours. Avoid making critical decisions or signing legal documents until tomorrow.    3. ACTIVITY: AVOID activities that require bending of the affected arm/wrist for 3 days and submerging the site in water for 3 days. REMOVE the dressing the day after  the procedure, and shower.  Apply a bandaid to site after shower.  WEAR wrist immobilizer until tomorrow night.    You may RESUME your normal activities or prescribed exercise program as instructed by your physician after 3 days.                                                                                                                 4. WOUND CARE: It is not unusual to have a small amount of bruising to appear at or near the puncture site. It is also common to have a tender "knot" develop beneath the skin at the puncture site of the wrist/arm. This is usually scar tissue and is not a cause for concern or alarm. This tender knot may take several weeks to fully resolve. The bruise will usually spread over several days. However, if the lump gets bigger, call your doctor immediately.    5. DISCOMFORT: For general discomfort at the puncture site, you may take 1 or 2 Acetaminophen (Tylenol) tablets every 4 - 6 hours as needed. (Do not take more than 4000 mg a day)    6. SIGNS AND SYMPTOMS TO REPORT:  Call your physician immediately if any of the following occur:                                            1. Loss of feeling, warmth or color to the affected arm/wrist                                                                                                          2. Mild beeding from the site                 3. Pain that is sudden, sharp or persistent in the affected arm/wrist                 4. Swelling " "or a change in "lump" size, increased redness or drainage at the puncture site                                                                               5. High fever (101 degrees or higher)    7. GO TO  THE EMERGENCY ROOM OR CALL 911 IF YOU HAVE: Chest pains or discomforts not relieved with 3 nitroglycerin doses (sublingual tablets or spray), numbness or severe pain of limb, if your limb becomes cold or discolored or if you develop uncontrolled bleeding from the puncture site (quickly apply firm, direct pressure above the site).                                                                                                                                                                                                                                 "

## 2019-11-22 ENCOUNTER — TELEPHONE (OUTPATIENT)
Dept: CARDIOLOGY | Facility: CLINIC | Age: 84
End: 2019-11-22

## 2019-11-22 NOTE — TELEPHONE ENCOUNTER
----- Message from Rich Ochoa sent at 11/22/2019  2:16 PM CST -----  Contact: self  Type:  Sooner Apoointment Request    Caller is requesting a sooner appointment.  Caller declined first available appointment listed below.  Caller will not accept being placed on the waitlist and is requesting a message be sent to doctor.  Name of Caller:Maldonado Deleon  When is the first available appointment?01/06  Symptoms:n/a  Would the patient rather a call back or a response via [facility]? Call back  Best Call Back Number:[phone]  Additional Information: pt needs f/u from heart scan    Thanks  Rich Ochoa

## 2019-11-22 NOTE — TELEPHONE ENCOUNTER
Scheduled pt with Angle Boss for 11/27 @15:30 @ [facility] as instructed in Landon's notes. Pt notified of appointment and the importance of 1 week follow up - pt verbalized understanding of both appointment and reason

## 2019-11-27 ENCOUNTER — TELEPHONE (OUTPATIENT)
Dept: CARDIOLOGY | Facility: HOSPITAL | Age: 84
End: 2019-11-27

## 2019-11-27 NOTE — TELEPHONE ENCOUNTER
Patient contacted and accepted the offer to take the 4:40pm on 12/5/2019 appointment.  Appointment Scheduled.            Previous message:  please call pt and let her know that I can see her next Thursday at 4:40 at my [facility] if she would like to see me instead of Farheen 12/3.

## 2019-11-27 NOTE — TELEPHONE ENCOUNTER
Please call pt and let her know that I can see her next Thursday at 4:40 at my [facility] if she would like to see me instead of Farheen 12/3.    Dr Taylor

## 2019-12-02 ENCOUNTER — HOSPITAL ENCOUNTER (EMERGENCY)
Facility: HOSPITAL | Age: 84
Discharge: HOME | End: 2019-12-02
Attending: EMERGENCY MEDICINE
Payer: MEDICARE

## 2019-12-02 VITALS
RESPIRATION RATE: 18 BRPM | OXYGEN SATURATION: 96 % | SYSTOLIC BLOOD PRESSURE: 193 MMHG | DIASTOLIC BLOOD PRESSURE: 91 MMHG | HEART RATE: 57 BPM | BODY MASS INDEX: 38.28 KG/M2 | HEIGHT: 64 IN | TEMPERATURE: 99 F

## 2019-12-02 DIAGNOSIS — S00.83XA CONTUSION OF FACE, INITIAL ENCOUNTER: ICD-10-CM

## 2019-12-02 DIAGNOSIS — R07.81 RIB PAIN: ICD-10-CM
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
## 2019-12-02 PROCEDURE — 99284 EMERGENCY DEPT VISIT MOD MDM: CPT | Mod: 25

## 2019-12-02 PROCEDURE — 25000003 PHARM REV CODE 250: Performed by: EMERGENCY MEDICINE

## 2019-12-02 RX ORDER — TRAMADOL HYDROCHLORIDE 50 MG/1
50 TABLET ORAL EVERY 6 HOURS PRN
Qty: 10 TABLET | Refills: 0 | Status: SHIPPED | OUTPATIENT
Start: 2019-12-02 | End: 2019-12-07

## 2019-12-02 RX ORDER — HYDROCODONE BITARTRATE AND ACETAMINOPHEN 5; 325 MG/1; MG/1
1 TABLET ORAL
Status: COMPLETED | OUTPATIENT
Start: 2019-12-02 | End: 2019-12-02

## 2019-12-02 RX ADMIN — HYDROCODONE BITARTRATE AND ACETAMINOPHEN 1 TABLET: 5; 325 TABLET ORAL at 07:12

## 2019-12-02 NOTE — ED NOTES
Dr. Duke notified about pt blood pressure reading of 193/91 and that pt has not taken any of her medications yet today. Dr. Duke stated that pt should take meds as soon as she gets home. Instructions relayed to pt.

## 2019-12-02 NOTE — ED PROVIDER NOTES
SCRIBE #1 NOTE: I, Reinaldo Arroyo am scribing for, and in the presence of, Balwinder Duke MD. I have scribed the entire note.       History     Chief Complaint   Patient presents with    Fall     pt states she fell out of the bed at aprox. 4 am and hit the right side of her face- hematoma to the right forehead and abrasion noted below the right eye     Review of patient's allergies indicates:  No Known Allergies      History of Present Illness     HPI    12/2/2019, 6:23 AM  History obtained from the patient and family       History of Present Illness: Maldonado Deleon is a 84 y.o. female patient with a PMHx of CAD, colon cancer, COPD, DM, emphysema, HLD, and HTN who presents to the Emergency Department for evaluation s/p fall which onset around 2 hours ago. Pt reports she was asleep when she rolled out of bed and onto the ground. Pt reports bruising to right face and pain to right rib area. Daughter reports pt tried to call her on the phone following the incident, but daughter did not answer and pt was picked up by fire rescue. Symptoms are constant and moderate in severity. No mitigating or exacerbating factors reported. No other sxs reported. Patient denies any LOC, HA, dizziness, back pain, neck pain, knee pain, hip pain, abd pain, CP, SOB, and all other sxs at this time. No further complaints or concerns at this time.         Arrival mode: Personal vehicle     PCP: Bryson Nogueira MD        Past Medical History:  Past Medical History:   Diagnosis Date    Anticoagulant long-term use     Plavix    Atypical chest pain 8/26/2013    Back pain     CAD (coronary artery disease)     CAD in native artery 10/5/2015    Carotid artery disease without cerebral infarction 8/26/2013    Colon cancer     resection and chemo.    COPD (chronic obstructive pulmonary disease)     Depression     Diabetes mellitus type II     Diaphragmatic hernia without obstruction and without gangrene 8/13/2015    Emphysema of lung      Encounter for blood transfusion     Gallstone pancreatitis     History of PTCA 10/5/2015    Hyperlipidemia     Hypertension     Lymphocytic colitis     Meningioma     Neuropathy 2017    OA (osteoarthritis)     Obesity 2013    Oxygen dependent     q hs and prn       Past Surgical History:  Past Surgical History:   Procedure Laterality Date    ABDOMINAL SURGERY      BACK SURGERY      x 2    CATARACT EXTRACTION      CHOLECYSTECTOMY      COLECTOMY      CORONARY ANGIOPLASTY      EYE SURGERY      JOINT REPLACEMENT Right     hip replacement x 4    LEFT HEART CATHETERIZATION Left 2019    Procedure: CATHETERIZATION, HEART, LEFT;  Surgeon: Kurt Taylor MD;  Location: [facility] CATH [facility];  Service: Cardiology;  Laterality: Left;    PARATHYROIDECTOMY      SHOULDER SURGERY Right     TOTAL HIP ARTHROPLASTY Left     TOTAL KNEE ARTHROPLASTY Bilateral          Family History:  Family History   Problem Relation Age of Onset    Hypertension Mother     Diabetes Mother     Heart failure Mother     Diabetes Maternal Aunt     Hypertension Maternal Aunt     Heart failure Maternal Aunt     Diabetes Maternal Aunt     Hypertension Maternal Aunt     Heart failure Maternal Aunt     Diabetes Maternal Aunt     Hypertension Maternal Aunt     Heart failure Maternal Aunt     Diabetes Maternal Aunt     Hypertension Maternal Aunt     Heart failure Maternal Aunt     Heart disease Father        Social History:  Social History     Tobacco Use    Smoking status: Former Smoker     Packs/day: 2.50     Years: 35.00     Pack years: 87.50     Types: Cigarettes     Start date: 1952     Last attempt to quit: 1988     Years since quittin.9    Smokeless tobacco: Never Used   Substance and Sexual Activity    Alcohol use: No     Alcohol/week: 0.0 standard drinks    Drug use: No    Sexual activity: Never        Review of Systems     Review of Systems   Constitutional: Negative for fever.   HENT:  Negative for sore throat.    Respiratory: Negative for shortness of breath.    Cardiovascular: Negative for chest pain.   Gastrointestinal: Negative for abdominal pain and nausea.   Genitourinary: Negative for dysuria.   Musculoskeletal: Negative for back pain and neck pain.        (+) R rib area pain   (-) hip pain, knee pain    Skin: Negative for rash.   Neurological: Negative for dizziness, weakness and headaches.        (-) LOC   Hematological: Does not bruise/bleed easily.   All other systems reviewed and are negative.       Physical Exam     Initial Vitals [12/02/19 0618]   BP Pulse Resp Temp SpO2   (!) 205/82 (!) 55 18 98.5 °F (36.9 °C) (!) 91 %      MAP       --          Physical Exam  Nursing Notes and Vital Signs Reviewed.  Constitutional: Patient is in no distress. Awake and alert. Appropriate for age.   Head: Contusion to right forehead and right periorbital. No facial instability or step-offs.   Eyes: PERRL. EOM normal. Conjunctivae normal.   HENT: Moist mucous membranes. No epistaxis. Patent airway.   Neck: No midline bony tenderness, deformities, or step-offs   Cardiovascular: Regular rate and rhythm. Heart sounds are normal. Intact distal pulses   Pulmonary/Chest: No respiratory distress. Breath sounds are normal. No decreased breath sounds. Chest wall is stable. Mild rib tenderness on palpation.   Abdominal: Soft and non-distended. Non-tender.   Back: No abrasions or ecchymosis. No midline bony tenderness to the T-spine or L-spine. No deformities or step-offs.   Musculoskeletal: Full range of motion in bilateral extremities. No obvious deformities.   Skin: Normal color. No cyanosis. No lacerations. No abrasions   Neurological: Awake and alert. Appropriate for age. GCS 15. Normal speech. Motor strength is normal at 5/5 bilaterally. Non-focal neurological examination.       ED Course   Procedures  ED Vital Signs:  Vitals:    12/02/19 0618 12/02/19 0731 12/02/19 0857   BP: (!) 205/82 (!) 229/102 (!)  "193/91   Pulse: (!) 55  (!) 57   Resp: 18  18   Temp: 98.5 °F (36.9 °C)     TempSrc: Oral     SpO2: (!) 91%  96%   Height: 5' 4" (1.626 m)          Imaging Results:  Imaging Results          X-Ray Ribs 2 View Right (Final result)  Result time 12/02/19 08:44:17    Final result by Wang Chadwick MD (12/02/19 08:44:17)                 Impression:      No acute displaced right sided rib fractures.      Electronically signed by: Wang Chadwick  Date:    12/02/2019  Time:    08:44             Narrative:    EXAMINATION:  XR RIBS 2 VIEW RIGHT    CLINICAL HISTORY:  Pleurodynia    TECHNIQUE:  Two views of the right ribs were performed.    COMPARISON:  Chest radiograph 02/20/2018 with multiple priors    FINDINGS:  Multiple views of the right ribs demonstrate no acute displaced rib fracture.  Partially visualized right hemithorax demonstrates continued diffuse interstitial opacities and aortic atherosclerotic calcification.  Postsurgical changes of a prior vertebral body augmentation in the upper lumbar spine.  Postsurgical changes of right upper quadrant presumed cholecystectomy clips.  Postsurgical changes the right rotator cuff.                               CT Maxillofacial Without Contrast (Final result)  Result time 12/02/19 07:28:15    Final result by Román Damon MD (12/02/19 07:28:15)                 Impression:      Negative for acute fracture.    See chronic findings above.    All CT scans at this facility use dose modulation, iterative reconstruction and/or weight based dosing when appropriate to reduce radiation dose to as low as reasonably achievable.      Electronically signed by: Román Damon MD  Date:    12/02/2019  Time:    07:28             Narrative:    EXAMINATION:  CT MAXILLOFACIAL WITHOUT CONTRAST    CLINICAL HISTORY:  Maxface trauma blunt;    TECHNIQUE:  Axial CT images through the facial bones were obtained without contrast.    COMPARISON:  12/02/2019, 07/29/2019.    FINDINGS:  Moderate left " frontal scalp soft tissue hematoma.    Opacification of posterior right ethmoid air cell.  Moderate polyp or retention cyst left maxillary sinus.  Some calcified debris seen within the anterior aspect of the left frontal sinus likely reflecting chronic sinusitis.  Mild diffuse mucosal thickening throughout the paranasal sinuses.  Ostiomeatal complexes are clear.  Mild rightward nasal septal deviation.    Odontogenic disease with multiple dental caries noted.    Nondisplaced right nasal bone fracture which is of unknown chronicity but appears chronic.  No definite acute fractures.    Orbits are intact.    Advanced degenerative changes of the cervical spine.  Diffuse osteopenia.  Bone island seen within the posterior arch of C2.    Dense vascular calcifications.  Medial course of the left carotid artery gives impression on the posterior pharynx.    Partially visualized calcified right anterior parafalcine meningioma.                               CT Head Without Contrast (Final result)  Result time 12/02/19 07:21:51    Final result by Román Damon MD (12/02/19 07:21:51)                 Impression:      Moderate-sized right frontal scalp soft tissue hematoma.  No evidence of acute intracranial hemorrhage.    Stable appearance of a right anterior parafalcine meningioma measuring approximately 1.3 cm.    All CT scans at this facility use dose modulation, iterative reconstruction, and/or weight based dosing when appropriate to reduce radiation dose to as low as reasonable achievable.      Electronically signed by: Román Damon MD  Date:    12/02/2019  Time:    07:21             Narrative:    EXAMINATION:  CT HEAD WITHOUT CONTRAST    CLINICAL HISTORY:  Headache, post trauma;    TECHNIQUE:  Low dose axial CT images obtained throughout the head without intravenous contrast. Sagittal and coronal reconstructions were performed.    COMPARISON:  07/29/2019    FINDINGS:  Intracranial compartment:    The brain parenchyma  demonstrates areas of decreased attenuation with moderate periventricular white matter consistent with chronic microvascular ischemic changes.  Remote bilateral lacunar infarcts..  No parenchymal mass, hemorrhage, edema or major vascular distribution infarct.  Vascular calcifications are noted.  Stable appearance of a right anterior parafalcine meningioma measuring approximately 1.3 cm.    Moderate prominence of the sulci and ventricles are consistent with age-related involutional changes.    No extra-axial blood or fluid collections.    Skull/extracranial contents (limited evaluation): Moderate-sized right frontal scalp soft tissue hematoma.  No fracture.  Similar appearance of patchy ethmoid opacification within posterior right ethmoid air cells.  Mild sinus mucosal thickening.  Mastoid air cells and paranasal sinuses are essentially clear.                                         The Emergency Provider reviewed the vital signs and test results, which are outlined above.     ED Discussion       8:21 AM: Reassessed pt at this time.  Pt states her condition has improved at this time. Discussed with pt all pertinent ED information and results. Discussed pt dx and plan of tx. Gave pt all f/u and return to the ED instructions. All questions and concerns were addressed at this time. Pt expresses understanding of information and instructions, and is comfortable with plan to discharge. Pt is stable for discharge.    Trauma precautions were discussed with patient and/or family/caretaker; I do not specifically detect any abdominal, thoracic, CNS, orthopedic, or other emergent or life threatening condition and that patient is safe to be discharged.  It was also discussed that despite an unrevealing examination and negative radiographic examination for serious or life threatening injury, these conditions may still exist.  As such, patient should return to ED immediately should they experience, severe or worsening pain,  shortness of breath, abdominal pain, headache, vomiting, or any other concern.  It was also discussed that not infrequently, injuries may not be diagnosed during the initial ED visit (such as fractures) and that if the patient discovers a new area of concern, a new area of injury that was not evaluated in the ED, they should return for evaluation as they may have an injury that requires treatment.    I discussed with patient and/or family/caretaker that evaluation in the ED does not suggest any emergent or life threatening medical conditions requiring immediate intervention beyond what was provided in the ED, and I believe patient is safe for discharge.  Regardless, an unremarkable evaluation in the ED does not preclude the development or presence of a serious of life threatening condition. As such, patient was instructed to return immediately for any worsening or change in current symptoms.         Medical Decision Making:   Clinical Tests:   Radiological Study: Reviewed and Ordered           ED Medication(s):  Medications   HYDROcodone-acetaminophen 5-325 mg per tablet 1 tablet (1 tablet Oral Given 12/2/19 0757)       Discharge Medication List as of 12/2/2019  8:24 AM      START taking these medications    Details   traMADol (ULTRAM) 50 mg tablet Take 1 tablet (50 mg total) by mouth every 6 (six) hours as needed for Pain., Starting Mon 12/2/2019, Until Sat 12/7/2019, Print             Follow-up Information     Bryson Nogueira MD.    Specialty:  Family Medicine  Contact information:  [id number]  [place] [address]  [phone]                       Scribe Attestation:   Scribe #1: I performed the above scribed service and the documentation accurately describes the services I performed. I attest to the accuracy of the note.     Attending:   Physician Attestation Statement for Scribe #1: I, Balwinder Duke MD, personally performed the services described in this documentation, as scribed by Reinaldo  Day, in my presence, and it is both accurate and complete.           Clinical Impression       ICD-10-CM ICD-9-CM   1. Fall, initial encounter W19.XXXA E888.9   2. Rib pain R07.81 786.50   3. Contusion of face, initial encounter S00.83XA 920       Disposition:   Disposition: Discharged  Condition: Stable         Balwinder Duke MD  12/02/19 0977

## 2019-12-05 ENCOUNTER — OFFICE VISIT (OUTPATIENT)
Dept: CARDIOLOGY | Facility: CLINIC | Age: 84
End: 2019-12-05
Payer: MEDICARE

## 2019-12-05 ENCOUNTER — OFFICE VISIT (OUTPATIENT)
Dept: INTERNAL MEDICINE | Facility: CLINIC | Age: 84
End: 2019-12-05
Payer: MEDICARE

## 2019-12-05 VITALS
BODY MASS INDEX: 38.98 KG/M2 | HEART RATE: 44 BPM | TEMPERATURE: 97 F | SYSTOLIC BLOOD PRESSURE: 112 MMHG | WEIGHT: 220 LBS | HEIGHT: 63 IN | DIASTOLIC BLOOD PRESSURE: 70 MMHG

## 2019-12-05 VITALS
BODY MASS INDEX: 40.36 KG/M2 | DIASTOLIC BLOOD PRESSURE: 100 MMHG | HEART RATE: 63 BPM | WEIGHT: 227.75 LBS | SYSTOLIC BLOOD PRESSURE: 172 MMHG | HEIGHT: 63 IN

## 2019-12-05 DIAGNOSIS — E11.9 CONTROLLED TYPE 2 DIABETES MELLITUS WITHOUT COMPLICATION, WITHOUT LONG-TERM CURRENT USE OF INSULIN: ICD-10-CM

## 2019-12-05 DIAGNOSIS — E11.9 CONTROLLED TYPE 2 DIABETES MELLITUS WITHOUT COMPLICATION, WITHOUT LONG-TERM CURRENT USE OF INSULIN: Primary | ICD-10-CM

## 2019-12-05 DIAGNOSIS — R60.0 EDEMA OF BOTH LEGS: ICD-10-CM

## 2019-12-05 DIAGNOSIS — E66.01 CLASS 2 SEVERE OBESITY DUE TO EXCESS CALORIES WITH SERIOUS COMORBIDITY AND BODY MASS INDEX (BMI) OF 39.0 TO 39.9 IN ADULT: ICD-10-CM

## 2019-12-05 DIAGNOSIS — Z98.61 HISTORY OF PTCA: ICD-10-CM
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
## 2019-12-05 DIAGNOSIS — I10 ESSENTIAL HYPERTENSION: ICD-10-CM

## 2019-12-05 DIAGNOSIS — I20.9 AP (ANGINA PECTORIS): ICD-10-CM

## 2019-12-05 DIAGNOSIS — I65.23 ASYMPTOMATIC STENOSIS OF BOTH CAROTID ARTERIES WITHOUT INFARCTION: ICD-10-CM

## 2019-12-05 DIAGNOSIS — R26.81 UNSTEADINESS ON FEET: ICD-10-CM

## 2019-12-05 DIAGNOSIS — I51.89 DIASTOLIC DYSFUNCTION: ICD-10-CM

## 2019-12-05 DIAGNOSIS — I25.10 CAD IN NATIVE ARTERY: ICD-10-CM

## 2019-12-05 DIAGNOSIS — I20.89 CHRONIC STABLE ANGINA: Primary | ICD-10-CM

## 2019-12-05 DIAGNOSIS — R94.39 ABNORMAL NUCLEAR STRESS TEST: ICD-10-CM

## 2019-12-05 DIAGNOSIS — G62.9 NEUROPATHY: ICD-10-CM

## 2019-12-05 DIAGNOSIS — R06.09 DOE (DYSPNEA ON EXERTION): ICD-10-CM

## 2019-12-05 DIAGNOSIS — I77.9 CAROTID ARTERY DISEASE WITHOUT CEREBRAL INFARCTION: ICD-10-CM

## 2019-12-05 DIAGNOSIS — E78.2 MIXED HYPERLIPIDEMIA: ICD-10-CM

## 2019-12-05 PROCEDURE — 1125F AMNT PAIN NOTED PAIN PRSNT: CPT | Mod: S$GLB,,, | Performed by: INTERNAL MEDICINE

## 2019-12-05 PROCEDURE — 1101F PT FALLS ASSESS-DOCD LE1/YR: CPT | Mod: CPTII,S$GLB,, | Performed by: INTERNAL MEDICINE

## 2019-12-05 PROCEDURE — 1159F PR MEDICATION LIST DOCUMENTED IN MEDICAL RECORD: ICD-10-PCS | Mod: S$GLB,,, | Performed by: INTERNAL MEDICINE

## 2019-12-05 PROCEDURE — 1125F PR PAIN SEVERITY QUANTIFIED, PAIN PRESENT: ICD-10-PCS | Mod: S$GLB,,, | Performed by: FAMILY MEDICINE

## 2019-12-05 PROCEDURE — 1101F PT FALLS ASSESS-DOCD LE1/YR: CPT | Mod: CPTII,S$GLB,, | Performed by: FAMILY MEDICINE

## 2019-12-05 PROCEDURE — 99999 PR PBB SHADOW E&M-EST. PATIENT-LVL III: CPT | Mod: PBBFAC,,, | Performed by: INTERNAL MEDICINE

## 2019-12-05 PROCEDURE — 1101F PR PT FALLS ASSESS DOC 0-1 FALLS W/OUT INJ PAST YR: ICD-10-PCS | Mod: CPTII,S$GLB,, | Performed by: FAMILY MEDICINE

## 2019-12-05 PROCEDURE — 1125F AMNT PAIN NOTED PAIN PRSNT: CPT | Mod: S$GLB,,, | Performed by: FAMILY MEDICINE

## 2019-12-05 PROCEDURE — 1159F MED LIST DOCD IN RCRD: CPT | Mod: S$GLB,,, | Performed by: FAMILY MEDICINE

## 2019-12-05 PROCEDURE — 99214 PR OFFICE/OUTPT VISIT, EST, LEVL IV, 30-39 MIN: ICD-10-PCS | Mod: S$GLB,,, | Performed by: FAMILY MEDICINE

## 2019-12-05 PROCEDURE — 3078F PR MOST RECENT DIASTOLIC BLOOD PRESSURE < 80 MM HG: ICD-10-PCS | Mod: CPTII,S$GLB,, | Performed by: FAMILY MEDICINE

## 2019-12-05 PROCEDURE — 3078F DIAST BP <80 MM HG: CPT | Mod: CPTII,S$GLB,, | Performed by: INTERNAL MEDICINE

## 2019-12-05 PROCEDURE — 1159F PR MEDICATION LIST DOCUMENTED IN MEDICAL RECORD: ICD-10-PCS | Mod: S$GLB,,, | Performed by: FAMILY MEDICINE

## 2019-12-05 PROCEDURE — 1159F MED LIST DOCD IN RCRD: CPT | Mod: S$GLB,,, | Performed by: INTERNAL MEDICINE

## 2019-12-05 PROCEDURE — 1125F PR PAIN SEVERITY QUANTIFIED, PAIN PRESENT: ICD-10-PCS | Mod: S$GLB,,, | Performed by: INTERNAL MEDICINE

## 2019-12-05 PROCEDURE — 99999 PR PBB SHADOW E&M-EST. PATIENT-LVL III: ICD-10-PCS | Mod: PBBFAC,,, | Performed by: INTERNAL MEDICINE

## 2019-12-05 PROCEDURE — 99999 PR PBB SHADOW E&M-EST. PATIENT-LVL III: CPT | Mod: PBBFAC,,, | Performed by: FAMILY MEDICINE

## 2019-12-05 PROCEDURE — 99214 OFFICE O/P EST MOD 30 MIN: CPT | Mod: S$GLB,,, | Performed by: FAMILY MEDICINE

## 2019-12-05 PROCEDURE — 99214 OFFICE O/P EST MOD 30 MIN: CPT | Mod: S$GLB,,, | Performed by: INTERNAL MEDICINE

## 2019-12-05 PROCEDURE — 3074F SYST BP LT 130 MM HG: CPT | Mod: CPTII,S$GLB,, | Performed by: FAMILY MEDICINE

## 2019-12-05 PROCEDURE — 99999 PR PBB SHADOW E&M-EST. PATIENT-LVL III: ICD-10-PCS | Mod: PBBFAC,,, | Performed by: FAMILY MEDICINE

## 2019-12-05 PROCEDURE — 3078F DIAST BP <80 MM HG: CPT | Mod: CPTII,S$GLB,, | Performed by: FAMILY MEDICINE

## 2019-12-05 PROCEDURE — 3078F PR MOST RECENT DIASTOLIC BLOOD PRESSURE < 80 MM HG: ICD-10-PCS | Mod: CPTII,S$GLB,, | Performed by: INTERNAL MEDICINE

## 2019-12-05 PROCEDURE — 3074F PR MOST RECENT SYSTOLIC BLOOD PRESSURE < 130 MM HG: ICD-10-PCS | Mod: CPTII,S$GLB,, | Performed by: FAMILY MEDICINE

## 2019-12-05 PROCEDURE — 3074F PR MOST RECENT SYSTOLIC BLOOD PRESSURE < 130 MM HG: ICD-10-PCS | Mod: CPTII,S$GLB,, | Performed by: INTERNAL MEDICINE

## 2019-12-05 PROCEDURE — 3074F SYST BP LT 130 MM HG: CPT | Mod: CPTII,S$GLB,, | Performed by: INTERNAL MEDICINE

## 2019-12-05 PROCEDURE — 1101F PR PT FALLS ASSESS DOC 0-1 FALLS W/OUT INJ PAST YR: ICD-10-PCS | Mod: CPTII,S$GLB,, | Performed by: INTERNAL MEDICINE

## 2019-12-05 PROCEDURE — 99214 PR OFFICE/OUTPT VISIT, EST, LEVL IV, 30-39 MIN: ICD-10-PCS | Mod: S$GLB,,, | Performed by: INTERNAL MEDICINE

## 2019-12-05 RX ORDER — ATENOLOL 50 MG/1
50 TABLET ORAL DAILY
Qty: 90 TABLET | Refills: 4 | Status: SHIPPED | OUTPATIENT
Start: 2019-12-05 | End: 2020-02-07 | Stop reason: SDUPTHER

## 2019-12-05 NOTE — PROGRESS NOTES
Subjective:       Patient ID: Maldonado Deleon is a 84 y.o. female.    Chief Complaint: Hospital Follow Up and Fall    Pt is a 84 year old who fell out of bed severely bruised her right forehead and cheeks with a subcutaneous hematoma in the right frontal area. Reviewed with pt all medications which could cause sedation. Discussed with pt getting side rails for her bed. Pt chronic medical conditions are stable    Review of Systems   Constitutional: Negative.    Respiratory: Negative.    Cardiovascular: Negative.    Genitourinary: Negative.    Musculoskeletal: Negative.    Neurological: Negative.    Hematological: Negative.    Psychiatric/Behavioral: Negative.        Objective:      Physical Exam   Constitutional: She is oriented to person, place, and time. She appears well-developed and well-nourished.   Cardiovascular: Normal rate and regular rhythm. Exam reveals no friction rub.   No murmur heard.  Pulmonary/Chest: Effort normal and breath sounds normal. No stridor. She has no wheezes.   Abdominal: She exhibits no distension. There is no tenderness.   Neurological: She is alert and oriented to person, place, and time.   Skin: Skin is warm and dry.       Assessment:       1. Controlled type 2 diabetes mellitus without complication, without long-term current use of insulin    2. Essential hypertension    3. Unsteadiness on feet    4. Class 2 severe obesity due to excess calories with serious comorbidity and body mass index (BMI) of 39.0 to 39.9 in adult    5. Neuropathy        Plan:       Controlled type 2 diabetes mellitus without complication, without long-term current use of insulin  Comments:  DM is controlled    Essential hypertension  Comments:  BP is well controlled    Unsteadiness on feet  Comments:  Likely due to the neuropathy. Have discussed PT    Class 2 severe obesity due to excess calories with serious comorbidity and body mass index (BMI) of 39.0 to 39.9 in adult  Comments:  weight is  stable    Neuropathy  Comments:  2nd to DM. Pt is taking the gabapentin but not make her sedated. could be impacting her sleep

## 2019-12-05 NOTE — PROGRESS NOTES
Subjective:    Patient ID:  Maldonado Deleon is a 84 y.o. female who presents for evaluation of Hypertension; Coronary Artery Disease; Carotid Artery Disease; Hyperlipidemia; Chest Pain; Shortness of Breath; Risk Factor Management For Atherosclerosis; and Peripheral Arterial Disease      HPI    Pt presents for f/u.  Her current medical conditions include CAD, COPD, right subclavian stenosis, carotid artery disease, hypertension, hyperlipidemia, diabetes (former smoker). Nonsmoker.  Past hx pertinent for following:  On home O2 qhs.   S/p LHC Jan 2009 nonobstructive CAD up to 50% mid LAD/Diagonal bifurcation lesion.   s/p LHC 6/15 for anginal sxs and had PCI of calcified 90% mid-RCA stenosis with Diamondback atherectomy and LINDSAY x one. Her mid-LAD stenosis was overall stable in 50 - 60% range and medical mgt advised.  Carotid u/s 7/18 < 50% stenosis, and R VA retrograde flow as noted in past.  Echo 10/19 normal EF, DD, LVH, LAE, mild MR, mild PHTN 41 mmHg.  Stress MPI 10/19 mild anterior ischemia, normal EF.  Mild anterior ischemia also noted on 2014 nuclear stress test.  Now here.  S/p LHC 11/19 for reevaluation of CAD.  LHC showed patent RCA stent, 60 - 70% calcified mid LAD/Diagonal bifurcation lesion, nonobstructive disease elsewhere, normal LVEF.  Continue OMT advised for CAD.  Ranexa added after her cath.  She has chronic COTTER, and angina.  Takes sl ntg prn for angina.  She fell earlier this week. Fell out of bed and has facial hematoma.  Seen in ER and released.  DM HGAIC well controlled.  Lipids well controlled, on statin tx.   Compliant with meds.  On home O2.  BP above goal.      Current Outpatient Medications:     acetaminophen (TYLENOL) 325 MG tablet, Take 650 mg by mouth every 6 (six) hours as needed for Pain., Disp: , Rfl:     albuterol (PROVENTIL/VENTOLIN HFA) 90 mcg/actuation inhaler, Inhale 2 puffs into the lungs every 6 (six) hours., Disp: 1 Inhaler, Rfl: 12    albuterol-ipratropium (DUO-NEB) 2.5  mg-0.5 mg/3 mL nebulizer solution, Take 3 mLs by nebulization every 6 (six) hours., Disp: 120 vial, Rfl: 12    amlodipine (NORVASC) 2.5 MG tablet, Take 1 tablet (2.5 mg total) by mouth once daily., Disp: 30 tablet, Rfl: 11    aspirin (ECOTRIN) 81 MG EC tablet, Take 81 mg by mouth once daily., Disp: , Rfl:     benazepril (LOTENSIN) 20 MG tablet, Take 1 tablet (20 mg total) by mouth 2 (two) times daily., Disp: 180 tablet, Rfl: 3    calcium carbonate (OS-RICHY) 600 mg (1,500 mg) Tab, Take 600 mg by mouth once daily. , Disp: , Rfl:     clopidogrel (PLAVIX) 75 mg tablet, Take 1 tablet (75 mg total) by mouth once daily., Disp: 90 tablet, Rfl: 3    gabapentin (NEURONTIN) 300 MG capsule, TAKE 2 CAPSULES BY MOUTH ONCE DAILY IN THE MORNING, TAKE  1  CAPSULE  AT  NOON,  AND TAKE 2  CAPSULES  AT  NIGHT, Disp: 150 capsule, Rfl: 3    hydrALAZINE (APRESOLINE) 10 MG tablet, Take 1 tablet (10 mg total) by mouth 3 (three) times daily., Disp: 90 tablet, Rfl: 11    isosorbide mononitrate (IMDUR) 60 MG 24 hr tablet, TAKE 1 TABLET BY MOUTH TWICE DAILY, Disp: 180 tablet, Rfl: 3    levocetirizine (XYZAL) 5 MG tablet, TAKE ONE TABLET BY MOUTH IN THE EVENING, Disp: 30 tablet, Rfl: 11    melatonin 10 mg Tab, Take 1 tablet by mouth every evening., Disp: , Rfl:     metFORMIN (GLUCOPHAGE) 500 MG tablet, TAKE 1 TABLET BY MOUTH TWICE DAILY WITH MEALS, Disp: 180 tablet, Rfl: 2    milk thistle 175 mg tablet, Take 175 mg by mouth once daily., Disp: , Rfl:     nitroGLYCERIN (NITROSTAT) 0.4 MG SL tablet, Place 1 tablet (0.4 mg total) under the tongue every 5 (five) minutes as needed for Chest pain., Disp: 60 tablet, Rfl: 12    ranolazine (RANEXA) 500 MG Tb12, Take 1 tablet (500 mg total) by mouth 2 (two) times daily., Disp: 60 tablet, Rfl: 11    rosuvastatin (CRESTOR) 20 MG tablet, Take 1 tablet (20 mg total) by mouth every evening., Disp: 90 tablet, Rfl: 3    sertraline (ZOLOFT) 100 MG tablet, Take 100 mg by mouth once daily., Disp: ,  "Rfl:     sertraline (ZOLOFT) 50 MG tablet, Take 1 tablet (50 mg total) by mouth once daily., Disp: 90 tablet, Rfl: 2    traMADol (ULTRAM) 50 mg tablet, Take 1 tablet (50 mg total) by mouth every 6 (six) hours as needed for Pain., Disp: 10 tablet, Rfl: 0    atenolol (TENORMIN) 50 MG tablet, Take 1 tablet (50 mg total) by mouth once daily., Disp: 90 tablet, Rfl: 4    nebulizer accessories Kit, Use with nebulizer, Disp: 1 kit, Rfl: prn    sertraline (ZOLOFT) 50 MG tablet, TAKE 1 TABLET BY MOUTH ONCE DAILY, Disp: 90 tablet, Rfl: 2    UNABLE TO FIND, 1 tablet once daily. MULTIVIT-iron-FA-calcium-mins tab 9mg-iron-400mcg, Disp: , Rfl:       Review of Systems   Constitution: Positive for malaise/fatigue.   HENT: Negative.    Eyes: Negative.    Cardiovascular: Positive for chest pain, dyspnea on exertion and leg swelling.   Respiratory: Positive for shortness of breath.    Endocrine: Negative.    Hematologic/Lymphatic: Bruises/bleeds easily.   Skin: Negative.    Musculoskeletal: Positive for arthritis and joint pain.   Gastrointestinal: Negative.    Genitourinary: Negative.    Neurological: Positive for loss of balance and weakness.   Psychiatric/Behavioral: Negative.    Allergic/Immunologic: Negative.        BP (!) 172/100 (BP Location: Left arm, Patient Position: Sitting, BP Method: Large (Manual))   Pulse 63   Ht 5' 3" (1.6 m)   Wt 103.3 kg (227 lb 11.8 oz)   BMI 40.34 kg/m²     Wt Readings from Last 3 Encounters:   12/05/19 103.3 kg (227 lb 11.8 oz)   12/05/19 99.8 kg (220 lb)   11/20/19 101.2 kg (223 lb)     Temp Readings from Last 3 Encounters:   12/05/19 97.3 °F (36.3 °C) (Tympanic)   12/02/19 98.5 °F (36.9 °C) (Oral)   11/20/19 97.9 °F (36.6 °C) (Temporal)     BP Readings from Last 3 Encounters:   12/05/19 (!) 172/100   12/05/19 112/70   12/02/19 (!) 193/91     Pulse Readings from Last 3 Encounters:   12/05/19 63   12/05/19 (!) 44   12/02/19 (!) 57          Objective:    Physical Exam   Constitutional: She " is oriented to person, place, and time. Vital signs are normal. She appears well-developed and well-nourished. She is active and cooperative. She does not have a sickly appearance. She does not appear ill. No distress.   HENT:   Head: Normocephalic.   Eyes:   Right facial/orbital bruising noted   Neck: Neck supple. Normal carotid pulses, no hepatojugular reflux and no JVD present. Carotid bruit is not present. No thyromegaly present.   Cardiovascular: Normal rate, regular rhythm, S1 normal, S2 normal and normal pulses. PMI is not displaced. Exam reveals no gallop and no friction rub.   Murmur heard.   Medium-pitched midsystolic murmur is present with a grade of 1/6 at the upper left sternal border.  Pulses:       Radial pulses are 2+ on the right side, and 2+ on the left side.   Pulmonary/Chest: Effort normal and breath sounds normal. She has no wheezes. She has no rales.   Abdominal: Soft. Normal appearance, normal aorta and bowel sounds are normal. She exhibits no pulsatile liver, no abdominal bruit, no ascites and no mass. There is no splenomegaly or hepatomegaly. There is no tenderness.   obese   Musculoskeletal: She exhibits edema.   Lymphadenopathy:     She has no cervical adenopathy.   Neurological: She is alert and oriented to person, place, and time.   Skin: Skin is warm. She is not diaphoretic.   Psychiatric: She has a normal mood and affect. Her behavior is normal.   Nursing note and vitals reviewed.      I have reviewed all pertinent labs and cardiac studies.      Chemistry        Component Value Date/Time     11/13/2019 1108    K 4.4 11/13/2019 1108     11/13/2019 1108    CO2 30 (H) 11/13/2019 1108    BUN 20 11/13/2019 1108    CREATININE 0.7 11/13/2019 1108    GLU 93 11/13/2019 1108        Component Value Date/Time    CALCIUM 9.7 11/13/2019 1108    ALKPHOS 50 (L) 09/24/2019 0819    AST 25 09/24/2019 0819    ALT 16 09/24/2019 0819    BILITOT 0.4 09/24/2019 0819    ESTGFRAFRICA >60.0  11/13/2019 1108    EGFRNONAA >60.0 11/13/2019 1108        Lab Results   Component Value Date    WBC 8.17 11/13/2019    HGB 13.3 11/13/2019    HCT 42.2 11/13/2019    MCV 94 11/13/2019     11/13/2019       Lab Results   Component Value Date    HGBA1C 5.7 (H) 09/24/2019     Lab Results   Component Value Date    CHOL 136 09/24/2019    CHOL 134 07/18/2018    CHOL 192 01/16/2018     Lab Results   Component Value Date    HDL 66 09/24/2019    HDL 61 07/18/2018    HDL 61 01/16/2018     Lab Results   Component Value Date    LDLCALC 43.6 (L) 09/24/2019    LDLCALC 51.8 (L) 07/18/2018    LDLCALC 108.0 01/16/2018     Lab Results   Component Value Date    TRIG 132 09/24/2019    TRIG 106 07/18/2018    TRIG 115 01/16/2018     Lab Results   Component Value Date    CHOLHDL 48.5 09/24/2019    CHOLHDL 45.5 07/18/2018    CHOLHDL 31.8 01/16/2018           Assessment:       1. Chronic stable angina    2. Abnormal nuclear stress test    3. AP (angina pectoris)    4. CAD in native artery    5. Carotid artery disease without cerebral infarction    6. Class 2 severe obesity due to excess calories with serious comorbidity and body mass index (BMI) of 39.0 to 39.9 in adult    7. Asymptomatic stenosis of both carotid arteries without infarction    8. History of PTCA    9. Essential hypertension    10. Mixed hyperlipidemia    11. Fall, initial encounter    12. COTTER (dyspnea on exertion)    13. Edema of both legs    14. Diastolic dysfunction    15. Controlled type 2 diabetes mellitus without complication, without long-term current use of insulin         Plan:             S/p C showing chronic CAD, LAD/Diag bifurcation lesion, that is best medically managed for now with OMT.  Increase Atenolol from 25 mg qd to 50 mg qd for HTN control/CAD/angina.  Continue other meds.  Fall precautions discussed -- expect 4 - 6 weeks for facial hematoma to resolve.  Reviewed all tests and above medical conditions with patient in detail and formulated  treatment plan.  Continue optimal medical treatment for cardiovascular conditions.  Cardiac low salt diet discussed.  Daily exercise encouraged.  Maintaining healthy weight and weight loss goals (if needed) were discussed in clinic.  Continue home O2/inhalers.  F/u with Pulmonary.  Keep DM HGAIC well controlled.  Medical tx for vascular disease.  F/u in 6 - 8 weeks.

## 2019-12-17 ENCOUNTER — TELEPHONE (OUTPATIENT)
Dept: INTERNAL MEDICINE | Facility: CLINIC | Age: 84
End: 2019-12-17

## 2019-12-17 NOTE — TELEPHONE ENCOUNTER
[facility] is willing to accept and ADL from you and face to face without patient going to [facility].  I will place what they want you to place in your note in your red folder.

## 2019-12-17 NOTE — TELEPHONE ENCOUNTER
----- Message from Xiao Johnston sent at 12/17/2019 11:19 AM CST -----  Contact: [facility]  Request a call from Nayeli concerning a face to face evaluation sent on this pt, no additional info given and can be reached at  [phone]///thxMW

## 2019-12-17 NOTE — TELEPHONE ENCOUNTER
----- Message from Didi Wolf sent at 12/17/2019  1:27 PM CST -----  Contact: [facility] need a signed MD order for a power wheel chair and a face to face evaluation. Please call at  [phone]      Fax# [phone]

## 2019-12-18 ENCOUNTER — TELEPHONE (OUTPATIENT)
Dept: INTERNAL MEDICINE | Facility: CLINIC | Age: 84
End: 2019-12-18

## 2019-12-18 NOTE — TELEPHONE ENCOUNTER
----- Message from Gloria Warner sent at 12/18/2019  1:23 PM CST -----  Contact: self  Patient requesting a call back to give some information from her insurance company. Please call patient back at [phone]

## 2020-01-17 DIAGNOSIS — I25.10 ATHEROSCLEROSIS OF NATIVE CORONARY ARTERY WITHOUT ANGINA PECTORIS, UNSPECIFIED WHETHER NATIVE OR TRANSPLANTED HEART: ICD-10-CM

## 2020-01-17 RX ORDER — CLOPIDOGREL BISULFATE 75 MG/1
TABLET ORAL
Qty: 90 TABLET | Refills: 4 | Status: SHIPPED | OUTPATIENT
Start: 2020-01-17 | End: 2020-02-07 | Stop reason: SDUPTHER

## 2020-01-29 DIAGNOSIS — E78.2 MIXED HYPERLIPIDEMIA: ICD-10-CM

## 2020-01-29 RX ORDER — SERTRALINE HYDROCHLORIDE 100 MG/1
TABLET, FILM COATED ORAL
Qty: 90 TABLET | Refills: 1 | Status: SHIPPED | OUTPATIENT
Start: 2020-01-29

## 2020-01-29 RX ORDER — ROSUVASTATIN CALCIUM 20 MG/1
TABLET, COATED ORAL
Qty: 90 TABLET | Refills: 4 | Status: SHIPPED | OUTPATIENT
Start: 2020-01-29

## 2020-02-07 ENCOUNTER — TELEPHONE (OUTPATIENT)
Dept: CARDIOLOGY | Facility: CLINIC | Age: 85
End: 2020-02-07

## 2020-02-07 DIAGNOSIS — I20.9 AP (ANGINA PECTORIS): ICD-10-CM

## 2020-02-07 DIAGNOSIS — I10 ESSENTIAL HYPERTENSION: ICD-10-CM

## 2020-02-07 DIAGNOSIS — I25.10 ATHEROSCLEROSIS OF NATIVE CORONARY ARTERY WITHOUT ANGINA PECTORIS, UNSPECIFIED WHETHER NATIVE OR TRANSPLANTED HEART: ICD-10-CM

## 2020-02-07 RX ORDER — ATENOLOL 50 MG/1
50 TABLET ORAL DAILY
Qty: 90 TABLET | Refills: 4 | Status: SHIPPED | OUTPATIENT
Start: 2020-02-07 | End: 2021-02-06

## 2020-02-07 RX ORDER — CLOPIDOGREL BISULFATE 75 MG/1
75 TABLET ORAL DAILY
Qty: 90 TABLET | Refills: 4 | Status: SHIPPED | OUTPATIENT
Start: 2020-02-07

## 2020-02-10 ENCOUNTER — OFFICE VISIT (OUTPATIENT)
Dept: INTERNAL MEDICINE | Facility: CLINIC | Age: 85
End: 2020-02-10
Payer: MEDICARE

## 2020-02-10 ENCOUNTER — LAB VISIT (OUTPATIENT)
Dept: LAB | Facility: HOSPITAL | Age: 85
End: 2020-02-10
Attending: FAMILY MEDICINE
Payer: MEDICARE

## 2020-02-10 VITALS
TEMPERATURE: 99 F | SYSTOLIC BLOOD PRESSURE: 166 MMHG | WEIGHT: 222.25 LBS | HEART RATE: 60 BPM | DIASTOLIC BLOOD PRESSURE: 86 MMHG | OXYGEN SATURATION: 94 % | HEIGHT: 63 IN | BODY MASS INDEX: 39.38 KG/M2

## 2020-02-10 DIAGNOSIS — E11.9 CONTROLLED TYPE 2 DIABETES MELLITUS WITHOUT COMPLICATION, WITHOUT LONG-TERM CURRENT USE OF INSULIN: Primary | ICD-10-CM

## 2020-02-10 DIAGNOSIS — N30.01 ACUTE CYSTITIS WITH HEMATURIA: ICD-10-CM

## 2020-02-10 DIAGNOSIS — I20.9 AP (ANGINA PECTORIS): ICD-10-CM

## 2020-02-10 DIAGNOSIS — C18.9 MALIGNANT NEOPLASM OF COLON, UNSPECIFIED PART OF COLON: ICD-10-CM

## 2020-02-10 DIAGNOSIS — I77.9 CAROTID ARTERY DISEASE WITHOUT CEREBRAL INFARCTION: ICD-10-CM

## 2020-02-10 DIAGNOSIS — E11.40 TYPE 2 DIABETES MELLITUS WITH DIABETIC NEUROPATHY, WITHOUT LONG-TERM CURRENT USE OF INSULIN: ICD-10-CM

## 2020-02-10 DIAGNOSIS — E66.01 CLASS 2 SEVERE OBESITY DUE TO EXCESS CALORIES WITH SERIOUS COMORBIDITY AND BODY MASS INDEX (BMI) OF 39.0 TO 39.9 IN ADULT: ICD-10-CM

## 2020-02-10 DIAGNOSIS — J44.9 CHRONIC OBSTRUCTIVE PULMONARY DISEASE, UNSPECIFIED COPD TYPE: ICD-10-CM

## 2020-02-10 LAB
ESTIMATED AVG GLUCOSE: 117 MG/DL (ref 68–131)
HBA1C MFR BLD HPLC: 5.7 % (ref 4–5.6)

## 2020-02-10 PROCEDURE — 83036 HEMOGLOBIN GLYCOSYLATED A1C: CPT

## 2020-02-10 PROCEDURE — 1159F MED LIST DOCD IN RCRD: CPT | Mod: S$GLB,,, | Performed by: FAMILY MEDICINE

## 2020-02-10 PROCEDURE — 99999 PR PBB SHADOW E&M-EST. PATIENT-LVL III: ICD-10-PCS | Mod: PBBFAC,,, | Performed by: FAMILY MEDICINE

## 2020-02-10 PROCEDURE — 99214 PR OFFICE/OUTPT VISIT, EST, LEVL IV, 30-39 MIN: ICD-10-PCS | Mod: S$GLB,,, | Performed by: FAMILY MEDICINE

## 2020-02-10 PROCEDURE — 3077F PR MOST RECENT SYSTOLIC BLOOD PRESSURE >= 140 MM HG: ICD-10-PCS | Mod: CPTII,S$GLB,, | Performed by: FAMILY MEDICINE

## 2020-02-10 PROCEDURE — 1159F PR MEDICATION LIST DOCUMENTED IN MEDICAL RECORD: ICD-10-PCS | Mod: S$GLB,,, | Performed by: FAMILY MEDICINE

## 2020-02-10 PROCEDURE — 99999 PR PBB SHADOW E&M-EST. PATIENT-LVL III: CPT | Mod: PBBFAC,,, | Performed by: FAMILY MEDICINE

## 2020-02-10 PROCEDURE — 3079F DIAST BP 80-89 MM HG: CPT | Mod: CPTII,S$GLB,, | Performed by: FAMILY MEDICINE

## 2020-02-10 PROCEDURE — 99499 UNLISTED E&M SERVICE: CPT | Mod: S$GLB,,, | Performed by: FAMILY MEDICINE

## 2020-02-10 PROCEDURE — 1101F PR PT FALLS ASSESS DOC 0-1 FALLS W/OUT INJ PAST YR: ICD-10-PCS | Mod: CPTII,S$GLB,, | Performed by: FAMILY MEDICINE

## 2020-02-10 PROCEDURE — 3079F PR MOST RECENT DIASTOLIC BLOOD PRESSURE 80-89 MM HG: ICD-10-PCS | Mod: CPTII,S$GLB,, | Performed by: FAMILY MEDICINE

## 2020-02-10 PROCEDURE — 99214 OFFICE O/P EST MOD 30 MIN: CPT | Mod: S$GLB,,, | Performed by: FAMILY MEDICINE

## 2020-02-10 PROCEDURE — 99499 RISK ADDL DX/OHS AUDIT: ICD-10-PCS | Mod: S$GLB,,, | Performed by: FAMILY MEDICINE

## 2020-02-10 PROCEDURE — 1125F AMNT PAIN NOTED PAIN PRSNT: CPT | Mod: S$GLB,,, | Performed by: FAMILY MEDICINE

## 2020-02-10 PROCEDURE — 1101F PT FALLS ASSESS-DOCD LE1/YR: CPT | Mod: CPTII,S$GLB,, | Performed by: FAMILY MEDICINE

## 2020-02-10 PROCEDURE — 36415 COLL VENOUS BLD VENIPUNCTURE: CPT

## 2020-02-10 PROCEDURE — 1125F PR PAIN SEVERITY QUANTIFIED, PAIN PRESENT: ICD-10-PCS | Mod: S$GLB,,, | Performed by: FAMILY MEDICINE

## 2020-02-10 PROCEDURE — 3077F SYST BP >= 140 MM HG: CPT | Mod: CPTII,S$GLB,, | Performed by: FAMILY MEDICINE

## 2020-02-10 RX ORDER — FLUTICASONE PROPIONATE 50 MCG
1 SPRAY, SUSPENSION (ML) NASAL DAILY
Qty: 16 G | Refills: 3 | Status: SHIPPED | OUTPATIENT
Start: 2020-02-10

## 2020-02-10 NOTE — PROGRESS NOTES
"Subjective:       Patient ID: Maldonado Deleon is a 84 y.o. female.    Chief Complaint: Headache    Pt is a 84 year old who feels "just bad" has chronic sinus congestion. Pt has had no fever. BP is not well controlled    Review of Systems   Constitutional: Negative.    Respiratory: Negative.    Musculoskeletal: Negative.        Objective:      Physical Exam   Constitutional: She is oriented to person, place, and time. She appears well-developed and well-nourished.   Cardiovascular: Normal rate and regular rhythm. Exam reveals no friction rub.   No murmur heard.  Pulmonary/Chest: Effort normal and breath sounds normal. No stridor.   Abdominal: Soft. Bowel sounds are normal.   Neurological: She is alert and oriented to person, place, and time.       Assessment:       1. Controlled type 2 diabetes mellitus without complication, without long-term current use of insulin    2. Malignant neoplasm of colon, unspecified part of colon    3. Type 2 diabetes mellitus with diabetic neuropathy, without long-term current use of insulin    4. Class 2 severe obesity due to excess calories with serious comorbidity and body mass index (BMI) of 39.0 to 39.9 in adult    5. AP (angina pectoris)    6. Carotid artery disease without cerebral infarction    7. Chronic obstructive pulmonary disease, unspecified COPD type    8. Acute cystitis with hematuria        Plan:       Controlled type 2 diabetes mellitus without complication, without long-term current use of insulin    Malignant neoplasm of colon, unspecified part of colon    Type 2 diabetes mellitus with diabetic neuropathy, without long-term current use of insulin  Comments:  Will continue with the lyrica  Orders:  -     Hemoglobin A1c; Future; Expected date: 02/10/2020    Class 2 severe obesity due to excess calories with serious comorbidity and body mass index (BMI) of 39.0 to 39.9 in adult    AP (angina pectoris)    Carotid artery disease without cerebral infarction    Chronic " obstructive pulmonary disease, unspecified COPD type    Acute cystitis with hematuria  -     Urine culture; Future; Expected date: 02/10/2020  -     Urinalysis; Future; Expected date: 02/10/2020

## 2020-02-14 ENCOUNTER — TELEPHONE (OUTPATIENT)
Dept: INTERNAL MEDICINE | Facility: CLINIC | Age: 85
End: 2020-02-14

## 2020-02-14 NOTE — TELEPHONE ENCOUNTER
----- Message from Olga Lidia Daniel sent at 2/14/2020  1:52 PM CST -----  Contact: PATIENT  Type:  Test Results    Who Called: patient  Name of Test (Lab/Mammo/Etc): lab results  Date of Test: Monday  Ordering Provider: Jero  Where the test was performed: [facility]  Would the patient rather a call back or a response via [facility]? Call  Best Call Back Number: [phone]  Additional Information:  Please call patient @ [facility]

## 2020-03-11 ENCOUNTER — PATIENT OUTREACH (OUTPATIENT)
Dept: ADMINISTRATIVE | Facility: HOSPITAL | Age: 85
End: 2020-03-11

## 2020-03-11 DIAGNOSIS — M89.9 BONE DISORDER: Primary | ICD-10-CM

## 2020-03-11 DIAGNOSIS — M81.6 LOCALIZED OSTEOPOROSIS (LEQUESNE): ICD-10-CM

## 2020-03-18 NOTE — TELEPHONE ENCOUNTER
----- Message from Jenny Johnston sent at 3/17/2020  3:46 PM CDT -----  Contact: self/[phone]  Type:  Patient Returning Call    Who Called:Maldonado Deleon  Who Left Message for Patient:nurse  Does the patient know what this is regarding?:medication  Would the patient rather a call back or a response via [facility]? Call back   Best Call Back Number:[phone]  Additional Information: Patient is waiting on diabetes medication to be call in to   [id number] - [place], [place] - [address] [facility] [address]  [address]  [place] [address]  Phone: [phone] Fax: [phone]  Patient states she can't test her sugar she is out supplies. Please call back at [phone]. Thanks/ar

## 2020-03-18 NOTE — TELEPHONE ENCOUNTER
----- Message from Ivette Campbell sent at 3/18/2020 12:01 PM CDT -----  Contact: Patient  Maldonado stated that her prescription was sent to the wrong pharmacy please move it to the [facility] on [address] [facility], Please call her at [phone].     [facility]  Ph: [phone]    Thanks  Td

## 2020-03-20 DIAGNOSIS — I10 ESSENTIAL HYPERTENSION: ICD-10-CM

## 2020-03-20 RX ORDER — BENAZEPRIL HYDROCHLORIDE 20 MG/1
TABLET ORAL
Qty: 180 TABLET | Refills: 4 | Status: SHIPPED | OUTPATIENT
Start: 2020-03-20 | End: 2020-03-20 | Stop reason: SDUPTHER

## 2020-03-20 RX ORDER — BENAZEPRIL HYDROCHLORIDE 20 MG/1
20 TABLET ORAL 2 TIMES DAILY
Qty: 180 TABLET | Refills: 4 | Status: SHIPPED | OUTPATIENT
Start: 2020-03-20

## 2020-03-23 ENCOUNTER — TELEPHONE (OUTPATIENT)
Dept: ADMINISTRATIVE | Facility: HOSPITAL | Age: 85
End: 2020-03-23

## 2020-03-23 NOTE — TELEPHONE ENCOUNTER
Contacted patient to reschedule appointment on 03/30/2020 with Dr Nogueira. Left voicemail for patient to call back to reschedule appointment.PDaugherty

## 2020-04-29 ENCOUNTER — TELEPHONE (OUTPATIENT)
Dept: CARDIOLOGY | Facility: CLINIC | Age: 85
End: 2020-04-29

## 2020-05-26 ENCOUNTER — TELEPHONE (OUTPATIENT)
Dept: INTERNAL MEDICINE | Facility: CLINIC | Age: 85
End: 2020-05-26

## 2020-05-26 NOTE — TELEPHONE ENCOUNTER
[facility] needs written order and notes for patient an electric scooter for frequent falls with injury.

## 2020-06-08 ENCOUNTER — TELEPHONE (OUTPATIENT)
Dept: INTERNAL MEDICINE | Facility: CLINIC | Age: 85
End: 2020-06-08

## 2020-06-08 NOTE — TELEPHONE ENCOUNTER
----- Message from Melanie Lazo sent at 6/8/2020 11:10 AM CDT -----  Contact: [facility], [place], [phone]  Calling to get authorization for a electric scooter.  Needs clinical notes and face to face evaluation.   FAX# [phone]

## 2020-06-08 NOTE — TELEPHONE ENCOUNTER
----- Message from Kenrick Paula sent at 6/5/2020 10:49 AM CDT -----  ..Type:  Patient Returning Call    Who Called Leyla ( [employer] )   Who Left Message for Patient:   Does the patient know what this is regarding?:office   Would the patient rather a call back or a response via [facility]?  Call back   Best Call Back Number: [phone]  Additional Information: Leyla ( [employer] ) is requesting  Face to face eval , clinical notes supporting

## 2020-06-08 NOTE — TELEPHONE ENCOUNTER
GEORGE Mayer with [employer] [facility] to return call/leroy

## 2020-06-25 ENCOUNTER — TELEPHONE (OUTPATIENT)
Dept: INTERNAL MEDICINE | Facility: CLINIC | Age: 85
End: 2020-06-25

## 2020-06-25 RX ORDER — GABAPENTIN 300 MG/1
CAPSULE ORAL
Qty: 150 CAPSULE | Refills: 0 | Status: SHIPPED | OUTPATIENT
Start: 2020-06-25

## 2020-06-25 NOTE — TELEPHONE ENCOUNTER
----- Message from Deya Spence sent at 6/25/2020 10:15 AM CDT -----  Regarding: appointment  Contact: pt  Stated she will like to be seen on the 07/07 if she can for a check up she wants to seen the doctor, she can be reached at [phone] Thanks

## 2020-06-29 ENCOUNTER — LAB VISIT (OUTPATIENT)
Dept: LAB | Facility: HOSPITAL | Age: 85
End: 2020-06-29
Attending: FAMILY MEDICINE
Payer: MEDICARE

## 2020-06-29 ENCOUNTER — TELEPHONE (OUTPATIENT)
Dept: INTERNAL MEDICINE | Facility: CLINIC | Age: 85
End: 2020-06-29

## 2020-06-29 ENCOUNTER — OFFICE VISIT (OUTPATIENT)
Dept: INTERNAL MEDICINE | Facility: CLINIC | Age: 85
End: 2020-06-29
Payer: MEDICARE

## 2020-06-29 VITALS
TEMPERATURE: 98 F | WEIGHT: 223.13 LBS | HEIGHT: 63 IN | SYSTOLIC BLOOD PRESSURE: 138 MMHG | DIASTOLIC BLOOD PRESSURE: 89 MMHG | BODY MASS INDEX: 39.54 KG/M2 | HEART RATE: 85 BPM | OXYGEN SATURATION: 94 %

## 2020-06-29 DIAGNOSIS — R26.81 UNSTEADINESS ON FEET: Primary | ICD-10-CM

## 2020-06-29 DIAGNOSIS — E11.40 TYPE 2 DIABETES MELLITUS WITH DIABETIC NEUROPATHY, WITHOUT LONG-TERM CURRENT USE OF INSULIN: ICD-10-CM

## 2020-06-29 DIAGNOSIS — Z20.822 CLOSE EXPOSURE TO COVID-19 VIRUS: ICD-10-CM

## 2020-06-29 DIAGNOSIS — I10 ESSENTIAL HYPERTENSION: ICD-10-CM

## 2020-06-29 DIAGNOSIS — I27.20 PULMONARY HYPERTENSION, UNSPECIFIED: ICD-10-CM

## 2020-06-29 DIAGNOSIS — R53.1 GENERAL WEAKNESS: ICD-10-CM

## 2020-06-29 LAB — SARS-COV-2 IGG SERPLBLD QL IA.RAPID: NEGATIVE

## 2020-06-29 PROCEDURE — 1159F MED LIST DOCD IN RCRD: CPT | Mod: S$GLB,,, | Performed by: FAMILY MEDICINE

## 2020-06-29 PROCEDURE — 86769 SARS-COV-2 COVID-19 ANTIBODY: CPT

## 2020-06-29 PROCEDURE — 99499 RISK ADDL DX/OHS AUDIT: ICD-10-PCS | Mod: S$GLB,,, | Performed by: FAMILY MEDICINE

## 2020-06-29 PROCEDURE — 99214 PR OFFICE/OUTPT VISIT, EST, LEVL IV, 30-39 MIN: ICD-10-PCS | Mod: S$GLB,,, | Performed by: FAMILY MEDICINE

## 2020-06-29 PROCEDURE — 99499 UNLISTED E&M SERVICE: CPT | Mod: S$GLB,,, | Performed by: FAMILY MEDICINE

## 2020-06-29 PROCEDURE — 1125F PR PAIN SEVERITY QUANTIFIED, PAIN PRESENT: ICD-10-PCS | Mod: S$GLB,,, | Performed by: FAMILY MEDICINE

## 2020-06-29 PROCEDURE — 1101F PR PT FALLS ASSESS DOC 0-1 FALLS W/OUT INJ PAST YR: ICD-10-PCS | Mod: CPTII,S$GLB,, | Performed by: FAMILY MEDICINE

## 2020-06-29 PROCEDURE — 99999 PR PBB SHADOW E&M-EST. PATIENT-LVL V: ICD-10-PCS | Mod: PBBFAC,,, | Performed by: FAMILY MEDICINE

## 2020-06-29 PROCEDURE — 3075F PR MOST RECENT SYSTOLIC BLOOD PRESS GE 130-139MM HG: ICD-10-PCS | Mod: CPTII,S$GLB,, | Performed by: FAMILY MEDICINE

## 2020-06-29 PROCEDURE — 99999 PR PBB SHADOW E&M-EST. PATIENT-LVL V: CPT | Mod: PBBFAC,,, | Performed by: FAMILY MEDICINE

## 2020-06-29 PROCEDURE — 3079F DIAST BP 80-89 MM HG: CPT | Mod: CPTII,S$GLB,, | Performed by: FAMILY MEDICINE

## 2020-06-29 PROCEDURE — 36415 COLL VENOUS BLD VENIPUNCTURE: CPT

## 2020-06-29 PROCEDURE — 1101F PT FALLS ASSESS-DOCD LE1/YR: CPT | Mod: CPTII,S$GLB,, | Performed by: FAMILY MEDICINE

## 2020-06-29 PROCEDURE — 1125F AMNT PAIN NOTED PAIN PRSNT: CPT | Mod: S$GLB,,, | Performed by: FAMILY MEDICINE

## 2020-06-29 PROCEDURE — 3079F PR MOST RECENT DIASTOLIC BLOOD PRESSURE 80-89 MM HG: ICD-10-PCS | Mod: CPTII,S$GLB,, | Performed by: FAMILY MEDICINE

## 2020-06-29 PROCEDURE — 99214 OFFICE O/P EST MOD 30 MIN: CPT | Mod: S$GLB,,, | Performed by: FAMILY MEDICINE

## 2020-06-29 PROCEDURE — 3075F SYST BP GE 130 - 139MM HG: CPT | Mod: CPTII,S$GLB,, | Performed by: FAMILY MEDICINE

## 2020-06-29 PROCEDURE — 1159F PR MEDICATION LIST DOCUMENTED IN MEDICAL RECORD: ICD-10-PCS | Mod: S$GLB,,, | Performed by: FAMILY MEDICINE

## 2020-06-29 RX ORDER — HYDROCODONE BITARTRATE AND ACETAMINOPHEN 5; 325 MG/1; MG/1
1 TABLET ORAL EVERY 8 HOURS PRN
Qty: 90 TABLET | Refills: 0 | Status: SHIPPED | OUTPATIENT
Start: 2020-06-29 | End: 2020-06-29 | Stop reason: SDUPTHER

## 2020-06-29 RX ORDER — AMOXICILLIN AND CLAVULANATE POTASSIUM 500; 125 MG/1; MG/1
1 TABLET, FILM COATED ORAL 2 TIMES DAILY
Qty: 20 TABLET | Refills: 0 | Status: SHIPPED | OUTPATIENT
Start: 2020-06-29

## 2020-06-29 RX ORDER — HYDROCODONE BITARTRATE AND ACETAMINOPHEN 5; 325 MG/1; MG/1
1 TABLET ORAL EVERY 8 HOURS PRN
Qty: 90 TABLET | Refills: 0 | Status: SHIPPED | OUTPATIENT
Start: 2020-06-29

## 2020-06-29 NOTE — PROGRESS NOTES
Subjective:       Patient ID: Maldonado Deleon is a 85 y.o. female.    Chief Complaint: No chief complaint on file.    Face to Face:      Pt is a 85 year old who is high risk for falls.Pt has severe arthritis in the both back and neck. Pt constantly fall risk. Pt would be a good candidate for scooter. Pt is needs to use scooter in performing safely her ADL's without risk for falls. Pt can safely transfer herself onto a scooter.    Review of Systems   Constitutional: Negative.    HENT: Positive for nasal congestion and postnasal drip.    Respiratory: Positive for cough.    Cardiovascular: Negative.    Gastrointestinal: Negative.    Genitourinary: Negative.    Musculoskeletal: Negative.    Neurological: Positive for weakness and numbness.         Objective:      Physical Exam  Constitutional:       Appearance: Normal appearance.   Neck:      Musculoskeletal: Normal range of motion and neck supple.   Cardiovascular:      Rate and Rhythm: Normal rate and regular rhythm.   Pulmonary:      Effort: Pulmonary effort is normal.      Breath sounds: Normal breath sounds.   Neurological:      Mental Status: She is alert.      Motor: Weakness present.      Gait: Gait abnormal.      Comments: 2/5 lower extremity muscle  3/5 upper extremity muscle.          Assessment:       1. Unsteadiness on feet    2. General weakness    3. Type 2 diabetes mellitus with diabetic neuropathy, without long-term current use of insulin    4. Essential hypertension    5. Pulmonary hypertension, unspecified    6. Close Exposure to Covid-19 Virus        Plan:       Unsteadiness on feet  Comments:  Pt would benefit from scooter to help with ADLs    General weakness  Comments:  Will send for possible scooter    Type 2 diabetes mellitus with diabetic neuropathy, without long-term current use of insulin  Comments:  Pt Hga1c is stable    Essential hypertension  Comments:  Pt BP is well controlled    Pulmonary hypertension,  unspecified  Comments:  stable    Close Exposure to Covid-19 Virus  Comments:  Will do covid ab  Orders:  -     COVID-19 (SARS CoV-2) IgG Antibody; Future; Expected date: 06/29/2020    Other orders  -     HYDROcodone-acetaminophen (NORCO) 5-325 mg per tablet; Take 1 tablet by mouth every 8 (eight) hours as needed for Pain.  Dispense: 90 tablet; Refill: 0  -     amoxicillin-clavulanate 500-125mg (AUGMENTIN) 500-125 mg Tab; Take 1 tablet (500 mg total) by mouth 2 (two) times daily.  Dispense: 20 tablet; Refill: 0

## 2020-07-05 ENCOUNTER — PATIENT OUTREACH (OUTPATIENT)
Dept: ADMINISTRATIVE | Facility: OTHER | Age: 85
End: 2020-07-05

## 2020-07-06 ENCOUNTER — TELEPHONE (OUTPATIENT)
Dept: INTERNAL MEDICINE | Facility: CLINIC | Age: 85
End: 2020-07-06

## 2020-07-06 NOTE — TELEPHONE ENCOUNTER
----- Message from Ritchie Ruiz sent at 7/6/2020  3:10 PM CDT -----  Regarding: Patient  The patient was told to call back an update the doctor on how she is responding to her new medication. Please call back at [phone] (home)

## 2020-07-06 NOTE — TELEPHONE ENCOUNTER
I returned call to pt she states that Dr. Nogueira wanted her to contact him and inform him how she has been on the Norco. She states that taking half a pill does not work so she take 1 in the morning and 1 at night as needed. I informed pt I would get message to Dr. Nogueira. //BJ

## 2020-07-16 ENCOUNTER — HOSPITAL ENCOUNTER (EMERGENCY)
Facility: HOSPITAL | Age: 85
Discharge: HOME | End: 2020-07-16
Attending: FAMILY MEDICINE
Payer: MEDICARE

## 2020-07-16 VITALS
RESPIRATION RATE: 18 BRPM | OXYGEN SATURATION: 95 % | HEART RATE: 95 BPM | TEMPERATURE: 99 F | SYSTOLIC BLOOD PRESSURE: 178 MMHG | DIASTOLIC BLOOD PRESSURE: 82 MMHG

## 2020-07-16 DIAGNOSIS — R06.02 SHORTNESS OF BREATH: ICD-10-CM

## 2020-07-16 DIAGNOSIS — U07.1 COVID-19 VIRUS DETECTED: Primary | ICD-10-CM

## 2020-07-16 LAB
ALBUMIN SERPL BCP-MCNC: 3.7 G/DL (ref 3.5–5.2)
ALP SERPL-CCNC: 49 U/L (ref 55–135)
ALT SERPL W/O P-5'-P-CCNC: 11 U/L (ref 10–44)
ANION GAP SERPL CALC-SCNC: 11 MMOL/L (ref 8–16)
AST SERPL-CCNC: 27 U/L (ref 10–40)
BASOPHILS # BLD AUTO: 0.01 K/UL (ref 0–0.2)
BASOPHILS NFR BLD: 0.3 % (ref 0–1.9)
BILIRUB SERPL-MCNC: 0.5 MG/DL (ref 0.1–1)
BILIRUB UR QL STRIP: NEGATIVE
BNP SERPL-MCNC: 122 PG/ML (ref 0–99)
BUN SERPL-MCNC: 14 MG/DL (ref 8–23)
CALCIUM SERPL-MCNC: 9 MG/DL (ref 8.7–10.5)
CHLORIDE SERPL-SCNC: 99 MMOL/L (ref 95–110)
CLARITY UR: CLEAR
CO2 SERPL-SCNC: 28 MMOL/L (ref 23–29)
COLOR UR: YELLOW
CREAT SERPL-MCNC: 0.7 MG/DL (ref 0.5–1.4)
DIFFERENTIAL METHOD: ABNORMAL
EOSINOPHIL # BLD AUTO: 0 K/UL (ref 0–0.5)
EOSINOPHIL NFR BLD: 0 % (ref 0–8)
ERYTHROCYTE [DISTWIDTH] IN BLOOD BY AUTOMATED COUNT: 13.8 % (ref 11.5–14.5)
EST. GFR  (AFRICAN AMERICAN): >60 ML/MIN/1.73 M^2
EST. GFR  (NON AFRICAN AMERICAN): >60 ML/MIN/1.73 M^2
GLUCOSE SERPL-MCNC: 94 MG/DL (ref 70–110)
GLUCOSE UR QL STRIP: NEGATIVE
HCT VFR BLD AUTO: 40 % (ref 37–48.5)
HGB BLD-MCNC: 12.7 G/DL (ref 12–16)
HGB UR QL STRIP: ABNORMAL
IMM GRANULOCYTES # BLD AUTO: 0.02 K/UL (ref 0–0.04)
IMM GRANULOCYTES NFR BLD AUTO: 0.6 % (ref 0–0.5)
INR PPP: 0.9 (ref 0.8–1.2)
KETONES UR QL STRIP: NEGATIVE
LACTATE SERPL-SCNC: 0.8 MMOL/L (ref 0.5–2.2)
LEUKOCYTE ESTERASE UR QL STRIP: NEGATIVE
LYMPHOCYTES # BLD AUTO: 0.6 K/UL (ref 1–4.8)
LYMPHOCYTES NFR BLD: 16.2 % (ref 18–48)
MCH RBC QN AUTO: 29.5 PG (ref 27–31)
MCHC RBC AUTO-ENTMCNC: 31.8 G/DL (ref 32–36)
MCV RBC AUTO: 93 FL (ref 82–98)
MONOCYTES # BLD AUTO: 0.4 K/UL (ref 0.3–1)
MONOCYTES NFR BLD: 10.3 % (ref 4–15)
NEUTROPHILS # BLD AUTO: 2.5 K/UL (ref 1.8–7.7)
NEUTROPHILS NFR BLD: 72.6 % (ref 38–73)
NITRITE UR QL STRIP: NEGATIVE
NRBC BLD-RTO: 0 /100 WBC
PH UR STRIP: 8 [PH] (ref 5–8)
PLATELET # BLD AUTO: 87 K/UL (ref 150–350)
PMV BLD AUTO: 11.8 FL (ref 9.2–12.9)
POTASSIUM SERPL-SCNC: 3.9 MMOL/L (ref 3.5–5.1)
PROCALCITONIN SERPL IA-MCNC: 0.03 NG/ML
PROT SERPL-MCNC: 7.4 G/DL (ref 6–8.4)
PROT UR QL STRIP: NEGATIVE
PROTHROMBIN TIME: 9.9 SEC (ref 9–12.5)
RBC # BLD AUTO: 4.3 M/UL (ref 4–5.4)
SARS-COV-2 RDRP RESP QL NAA+PROBE: POSITIVE
SODIUM SERPL-SCNC: 138 MMOL/L (ref 136–145)
SP GR UR STRIP: 1.01 (ref 1–1.03)
TROPONIN I SERPL DL<=0.01 NG/ML-MCNC: <0.006 NG/ML (ref 0–0.03)
URN SPEC COLLECT METH UR: ABNORMAL
UROBILINOGEN UR STRIP-ACNC: NEGATIVE EU/DL
WBC # BLD AUTO: 3.39 K/UL (ref 3.9–12.7)

## 2020-07-16 PROCEDURE — 80053 COMPREHEN METABOLIC PANEL: CPT

## 2020-07-16 PROCEDURE — 63600175 PHARM REV CODE 636 W HCPCS: Performed by: FAMILY MEDICINE

## 2020-07-16 PROCEDURE — 96374 THER/PROPH/DIAG INJ IV PUSH: CPT

## 2020-07-16 PROCEDURE — U0002 COVID-19 LAB TEST NON-CDC: HCPCS

## 2020-07-16 PROCEDURE — 84145 PROCALCITONIN (PCT): CPT

## 2020-07-16 PROCEDURE — 83605 ASSAY OF LACTIC ACID: CPT

## 2020-07-16 PROCEDURE — 96361 HYDRATE IV INFUSION ADD-ON: CPT

## 2020-07-16 PROCEDURE — 93010 EKG 12-LEAD: ICD-10-PCS | Mod: ,,, | Performed by: INTERNAL MEDICINE

## 2020-07-16 PROCEDURE — 85025 COMPLETE CBC W/AUTO DIFF WBC: CPT

## 2020-07-16 PROCEDURE — 93010 ELECTROCARDIOGRAM REPORT: CPT | Mod: ,,, | Performed by: INTERNAL MEDICINE

## 2020-07-16 PROCEDURE — 87040 BLOOD CULTURE FOR BACTERIA: CPT

## 2020-07-16 PROCEDURE — 99285 EMERGENCY DEPT VISIT HI MDM: CPT | Mod: 25

## 2020-07-16 PROCEDURE — 81003 URINALYSIS AUTO W/O SCOPE: CPT

## 2020-07-16 PROCEDURE — 25000003 PHARM REV CODE 250: Performed by: FAMILY MEDICINE

## 2020-07-16 PROCEDURE — 93005 ELECTROCARDIOGRAM TRACING: CPT

## 2020-07-16 PROCEDURE — 83880 ASSAY OF NATRIURETIC PEPTIDE: CPT

## 2020-07-16 PROCEDURE — 85610 PROTHROMBIN TIME: CPT

## 2020-07-16 PROCEDURE — 84484 ASSAY OF TROPONIN QUANT: CPT

## 2020-07-16 RX ORDER — HYDRALAZINE HYDROCHLORIDE 20 MG/ML
10 INJECTION INTRAMUSCULAR; INTRAVENOUS
Status: COMPLETED | OUTPATIENT
Start: 2020-07-16 | End: 2020-07-16

## 2020-07-16 RX ADMIN — HYDRALAZINE HYDROCHLORIDE 10 MG: 20 INJECTION INTRAMUSCULAR; INTRAVENOUS at 09:07

## 2020-07-16 RX ADMIN — SODIUM CHLORIDE 1000 ML: 0.9 INJECTION, SOLUTION INTRAVENOUS at 09:07

## 2020-07-17 ENCOUNTER — TELEPHONE (OUTPATIENT)
Dept: INTERNAL MEDICINE | Facility: CLINIC | Age: 85
End: 2020-07-17

## 2020-07-17 DIAGNOSIS — U07.1 COVID-19 VIRUS DETECTED: ICD-10-CM

## 2020-07-17 NOTE — ED PROVIDER NOTES
SCRIBE #1 NOTE: I, Sonia Philip, am scribing for, and in the presence of, Hamida Pathak MD. I have scribed the entire note.       History     Chief Complaint   Patient presents with    COVID-19 Concerns     sob, fever, cough, +exposure     Review of patient's allergies indicates:  No Known Allergies      History of Present Illness     HPI    7/16/2020, 9:00 PM  History obtained from the patient      History of Present Illness: Maldonado Deleon is a 85 y.o. female patient with a PMHx of CAD, COPD, DM, depression, HLD, HTN who presents to the Emergency Department for evaluation of SOB which onset gradually yesterday. Symptoms are constant and moderate in severity. No mitigating or exacerbating factors reported. Associated sxs include fatigue and subjective fever today. Pt reports pt has been around COVID positive people. Patient denies any chills, CP, diaphoresis, palpitations, extremity weakness/numbness, leg swelling, dizziness, cough, N/V, and all other sxs at this time. No further complaints or concerns at this time.        Arrival mode: Personal vehicle      PCP: Bryson Nogueira MD        Past Medical History:  Past Medical History:   Diagnosis Date    Anticoagulant long-term use     Plavix    Atypical chest pain 8/26/2013    Back pain     CAD (coronary artery disease)     CAD in native artery 10/5/2015    Carotid artery disease without cerebral infarction 8/26/2013    Colon cancer     resection and chemo.    COPD (chronic obstructive pulmonary disease)     Depression     Diabetes mellitus type II     Diaphragmatic hernia without obstruction and without gangrene 8/13/2015    Emphysema of lung     Encounter for blood transfusion     Gallstone pancreatitis     History of PTCA 10/5/2015    Hyperlipidemia     Hypertension     Lymphocytic colitis     Meningioma     Neuropathy 8/2/2017    OA (osteoarthritis)     Obesity 8/26/2013    Oxygen dependent     q hs and prn       Past Surgical  History:  Past Surgical History:   Procedure Laterality Date    ABDOMINAL SURGERY      BACK SURGERY      x 2    CATARACT EXTRACTION      CHOLECYSTECTOMY      COLECTOMY      CORONARY ANGIOPLASTY      EYE SURGERY      JOINT REPLACEMENT Right     hip replacement x 4    LEFT HEART CATHETERIZATION Left 2019    Procedure: CATHETERIZATION, HEART, LEFT;  Surgeon: Kurt Taylor MD;  Location: [facility] CATH LAB;  Service: Cardiology;  Laterality: Left;    PARATHYROIDECTOMY      SHOULDER SURGERY Right     TOTAL HIP ARTHROPLASTY Left     TOTAL KNEE ARTHROPLASTY Bilateral          Family History:  Family History   Problem Relation Age of Onset    Hypertension Mother     Diabetes Mother     Heart failure Mother     Diabetes Maternal Aunt     Hypertension Maternal Aunt     Heart failure Maternal Aunt     Diabetes Maternal Aunt     Hypertension Maternal Aunt     Heart failure Maternal Aunt     Diabetes Maternal Aunt     Hypertension Maternal Aunt     Heart failure Maternal Aunt     Diabetes Maternal Aunt     Hypertension Maternal Aunt     Heart failure Maternal Aunt     Heart disease Father        Social History:  Social History     Tobacco Use    Smoking status: Former Smoker     Packs/day: 2.50     Years: 35.00     Pack years: 87.50     Types: Cigarettes     Start date: 1952     Quit date: 1988     Years since quittin.5    Smokeless tobacco: Never Used   Substance and Sexual Activity    Alcohol use: No     Alcohol/week: 0.0 standard drinks    Drug use: No    Sexual activity: Never        Review of Systems     Review of Systems   Constitutional: Positive for fatigue and fever (subjective). Negative for chills and diaphoresis.   HENT: Negative for sore throat.    Respiratory: Positive for shortness of breath. Negative for cough.    Cardiovascular: Negative for chest pain, palpitations and leg swelling.   Gastrointestinal: Negative for nausea and vomiting.   Genitourinary: Negative  for dysuria.   Musculoskeletal: Negative for back pain.   Skin: Negative for rash.   Neurological: Negative for dizziness, weakness and numbness.   Hematological: Does not bruise/bleed easily.   All other systems reviewed and are negative.       Physical Exam     Initial Vitals [07/16/20 1955]   BP Pulse Resp Temp SpO2   (!) 176/118 80 (!) 30 98.8 °F (37.1 °C) 97 %      MAP       --          Physical Exam  Nursing Notes and Vital Signs Reviewed.  Constitutional: Patient is in no acute distress. Well-developed and well-nourished.  Head: Atraumatic. Normocephalic.  Eyes: PERRL. EOM intact. Conjunctivae are not pale. No scleral icterus.  ENT: Mucous membranes are moist. Oropharynx is clear and symmetric.    Neck: Supple. Full ROM. No lymphadenopathy.  Cardiovascular: Regular rate. Regular rhythm. No murmurs, rubs, or gallops. Distal pulses are 2+ and symmetric.  Pulmonary/Chest: No respiratory distress. Coarse breath sounds noted. Diminished breaths sounds at bilateral lower lung bases. No wheezing or rales.  Abdominal: Soft and non-distended.  There is no tenderness.  No rebound, guarding, or rigidity. Good bowel sounds.  Genitourinary: No CVA tenderness  Musculoskeletal: Moves all extremities. No obvious deformities. 1+ BLE edema. No calf tenderness.  Skin: Warm and dry.  Neurological:  Alert, awake, and appropriate.  Normal speech.  No acute focal neurological deficits are appreciated.  Psychiatric: Normal affect. Good eye contact. Appropriate in content.     ED Course   Procedures  ED Vital Signs:  Vitals:    07/16/20 1955 07/16/20 2030 07/16/20 2130 07/16/20 2200   BP: (!) 176/118 (!) 223/105 (!) 222/130 (!) 198/147   Pulse: 80 73 81 90   Resp: (!) 30 20 20 18   Temp: 98.8 °F (37.1 °C)      TempSrc: Oral      SpO2: 97% 95% 96% 97%    07/16/20 2221 07/16/20 2230   BP:  (!) 198/81   Pulse: 101 100   Resp:     Temp:     TempSrc:     SpO2:  95%       Abnormal Lab Results:  Labs Reviewed   CBC W/ AUTO DIFFERENTIAL -  Abnormal; Notable for the following components:       Result Value    WBC 3.39 (*)     Mean Corpuscular Hemoglobin Conc 31.8 (*)     Platelets 87 (*)     Immature Granulocytes 0.6 (*)     Lymph # 0.6 (*)     Lymph% 16.2 (*)     All other components within normal limits   COMPREHENSIVE METABOLIC PANEL - Abnormal; Notable for the following components:    Alkaline Phosphatase 49 (*)     All other components within normal limits   URINALYSIS, REFLEX TO URINE CULTURE - Abnormal; Notable for the following components:    Occult Blood UA Trace (*)     All other components within normal limits    Narrative:     Specimen Source->Urine   B-TYPE NATRIURETIC PEPTIDE - Abnormal; Notable for the following components:     (*)     All other components within normal limits   SARS-COV-2 RNA AMPLIFICATION, QUAL - Abnormal; Notable for the following components:    SARS-CoV-2 RNA, Amplification, Qual Positive (*)     All other components within normal limits   CULTURE, BLOOD   CULTURE, BLOOD   LACTIC ACID, PLASMA   TROPONIN I   PROTIME-INR   PROCALCITONIN   SARS-COV-2 RNA AMPLIFICATION, QUAL   LACTIC ACID, PLASMA        All Lab Results:  Results for orders placed or performed during the hospital encounter of 07/16/20   CBC auto differential   Result Value Ref Range    WBC 3.39 (L) 3.90 - 12.70 K/uL    RBC 4.30 4.00 - 5.40 M/uL    Hemoglobin 12.7 12.0 - 16.0 g/dL    Hematocrit 40.0 37.0 - 48.5 %    Mean Corpuscular Volume 93 82 - 98 fL    Mean Corpuscular Hemoglobin 29.5 27.0 - 31.0 pg    Mean Corpuscular Hemoglobin Conc 31.8 (L) 32.0 - 36.0 g/dL    RDW 13.8 11.5 - 14.5 %    Platelets 87 (L) 150 - 350 K/uL    MPV 11.8 9.2 - 12.9 fL    Immature Granulocytes 0.6 (H) 0.0 - 0.5 %    Gran # (ANC) 2.5 1.8 - 7.7 K/uL    Immature Grans (Abs) 0.02 0.00 - 0.04 K/uL    Lymph # 0.6 (L) 1.0 - 4.8 K/uL    Mono # 0.4 0.3 - 1.0 K/uL    Eos # 0.0 0.0 - 0.5 K/uL    Baso # 0.01 0.00 - 0.20 K/uL    nRBC 0 0 /100 WBC    Gran% 72.6 38.0 - 73.0 %     Lymph% 16.2 (L) 18.0 - 48.0 %    Mono% 10.3 4.0 - 15.0 %    Eosinophil% 0.0 0.0 - 8.0 %    Basophil% 0.3 0.0 - 1.9 %    Differential Method Automated    Comprehensive metabolic panel   Result Value Ref Range    Sodium 138 136 - 145 mmol/L    Potassium 3.9 3.5 - 5.1 mmol/L    Chloride 99 95 - 110 mmol/L    CO2 28 23 - 29 mmol/L    Glucose 94 70 - 110 mg/dL    BUN, Bld 14 8 - 23 mg/dL    Creatinine 0.7 0.5 - 1.4 mg/dL    Calcium 9.0 8.7 - 10.5 mg/dL    Total Protein 7.4 6.0 - 8.4 g/dL    Albumin 3.7 3.5 - 5.2 g/dL    Total Bilirubin 0.5 0.1 - 1.0 mg/dL    Alkaline Phosphatase 49 (L) 55 - 135 U/L    AST 27 10 - 40 U/L    ALT 11 10 - 44 U/L    Anion Gap 11 8 - 16 mmol/L    eGFR if African American >60 >60 mL/min/1.73 m^2    eGFR if non African American >60 >60 mL/min/1.73 m^2   Lactic acid, plasma #1   Result Value Ref Range    Lactate (Lactic Acid) 0.8 0.5 - 2.2 mmol/L   Urinalysis, Reflex to Urine Culture Urine, Clean Catch    Specimen: Urine   Result Value Ref Range    Specimen UA Urine, Clean Catch     Color, UA Yellow Yellow, Straw, Katrin    Appearance, UA Clear Clear    pH, UA 8.0 5.0 - 8.0    Specific Gravity, UA 1.015 1.005 - 1.030    Protein, UA Negative Negative    Glucose, UA Negative Negative    Ketones, UA Negative Negative    Bilirubin (UA) Negative Negative    Occult Blood UA Trace (A) Negative    Nitrite, UA Negative Negative    Urobilinogen, UA Negative <2.0 EU/dL    Leukocytes, UA Negative Negative   Troponin I   Result Value Ref Range    Troponin I <0.006 0.000 - 0.026 ng/mL   Brain natriuretic peptide   Result Value Ref Range     (H) 0 - 99 pg/mL   Protime-INR   Result Value Ref Range    Prothrombin Time 9.9 9.0 - 12.5 sec    INR 0.9 0.8 - 1.2   Procalcitonin   Result Value Ref Range    Procalcitonin 0.03 <0.25 ng/mL   COVID-19 Rapid Screening   Result Value Ref Range    SARS-CoV-2 RNA, Amplification, Qual Positive (A) Negative         Imaging Results:  Imaging Results          X-Ray Chest AP  Portable (Final result)  Result time 07/16/20 21:09:31    Final result by Luan Boss MD (07/16/20 21:09:31)                 Impression:      Bilateral peripheral ground-glass infiltrates consistent with atypical pneumonia/COVID-19.      Electronically signed by: Luan Boss MD  Date:    07/16/2020  Time:    21:09             Narrative:    EXAMINATION:  XR CHEST AP PORTABLE    CLINICAL HISTORY:  Sepsis;    TECHNIQUE:  AP view of the chest was performed.    COMPARISON:  02/20/2018    FINDINGS:  Mild cardiomegaly.    Bilateral peripheral ground-glass opacities bilaterally superimposed on chronic interstitial coarsening.  No acute osseous findings demonstrated.                                 The EKG was ordered, reviewed, and independently interpreted by the ED provider.  Interpretation time: 2208  Rate: 96 BPM  Rhythm: sinus rhythm with occasional premature ventricular complexes and premature atrial complexes  Interpretation: Anterior infarct. No STEMI.           The Emergency Provider reviewed the vital signs and test results, which are outlined above.     ED Discussion       11:00 PM: Reassessed pt at this time.  Discussed with pt all pertinent ED information and results. Discussed pt dx and plan of tx. Gave pt all f/u and return to the ED instructions. All questions and concerns were addressed at this time. Pt expresses understanding of information and instructions, and is comfortable with plan to discharge. Pt is stable for discharge.    I discussed with patient and/or family/caretaker that evaluation in the ED does not suggest any emergent or life threatening medical conditions requiring immediate intervention beyond what was provided in the ED, and I believe patient is safe for discharge.  Regardless, an unremarkable evaluation in the ED does not preclude the development or presence of a serious of life threatening condition. As such, patient was instructed to return immediately for any worsening or change in  current symptoms.         Medical Decision Making:   Clinical Tests:   Lab Tests: Ordered and Reviewed  Radiological Study: Ordered and Reviewed  Medical Tests: Ordered and Reviewed           ED Medication(s):  Medications   sodium chloride 0.9% bolus 30 mL/kg (0 mL/kg Intravenous Stopped 7/16/20 2238)   hydrALAZINE injection 10 mg (10 mg Intravenous Given 7/16/20 2148)       New Prescriptions    No medications on file       Follow-up Information     Schedule an appointment as soon as possible for a visit  with Bryson Nogueira MD.    Specialty: Family Medicine  Why: As needed  Contact information:  [id number]  [place] [address]  [phone]                       Scribe Attestation:   Scribe #1: I performed the above scribed service and the documentation accurately describes the services I performed. I attest to the accuracy of the note.     Attending:   Physician Attestation Statement for Scribe #1: I, Hamida Pathak MD, personally performed the services described in this documentation, as scribed by Sonia Philip, in my presence, and it is both accurate and complete.           Clinical Impression       ICD-10-CM ICD-9-CM   1. COVID-19 virus detected  U07.1    2. Shortness of breath  R06.02 786.05       Disposition:   Disposition: Discharged  Condition: Stable         Hamida Pathak MD  07/17/20 2018

## 2020-07-17 NOTE — TELEPHONE ENCOUNTER
----- Message from Amanda Aaron sent at 7/17/2020 12:54 PM CDT -----  Regarding: positive for the covid  Contact: pt  Caller is requesting a call back regarding a positive covid result. Please call back at .[phone]. Thanks.

## 2020-07-17 NOTE — TELEPHONE ENCOUNTER
Called pt back. Pt just wanted to inform  that she was positive to COVID and that she is quarantine. //AG

## 2020-07-22 ENCOUNTER — PATIENT MESSAGE (OUTPATIENT)
Dept: PULMONOLOGY | Facility: CLINIC | Age: 85
End: 2020-07-22

## 2020-07-22 LAB
BACTERIA BLD CULT: NORMAL
BACTERIA BLD CULT: NORMAL

## 2024-02-05 NOTE — TELEPHONE ENCOUNTER
Reason for Disposition   Message left on identified answering machine.    Protocols used: ST NO CONTACT OR DUPLICATE CONTACT CALL-A-AH      
0

## (undated) DEVICE — SEE MEDLINE ITEM 157027

## (undated) DEVICE — GAUZE SPONGE 4X4 12PLY

## (undated) DEVICE — ELECTRODE BLD EXT 6.50 ST DISP

## (undated) DEVICE — SHEET XLGE DRAPE

## (undated) DEVICE — KIT PT CARE HANA PROFX SSXT

## (undated) DEVICE — SOL IRR NACL .9% 3000ML

## (undated) DEVICE — SEE MEDLINE ITEM 157131

## (undated) DEVICE — SPONGE LAP 18X18 PREWASHED

## (undated) DEVICE — SCRUB 10% POVIDONE IODINE 4OZ

## (undated) DEVICE — SUT PROLENE 2-0 FSLX BL

## (undated) DEVICE — ALCOHOL 70% ISOP RUBBING 4OZ

## (undated) DEVICE — SEE MEDLINE ITEM 157125

## (undated) DEVICE — DRAPE STERI INSTRUMENT 1018

## (undated) DEVICE — ELECTRODE REM PLYHSV RETURN 9

## (undated) DEVICE — SEE MEDLINE ITEM 157166

## (undated) DEVICE — DRAPE INCISE IOBAN 2 23X33IN

## (undated) DEVICE — CONTAINER 32OZ PATHOLOGY

## (undated) DEVICE — SUPPORT ULNA NERVE PROTECTOR

## (undated) DEVICE — BLADE SYSTEM 6 25MMX90MMX1.27M

## (undated) DEVICE — SUT VICRYL BR 1 GEN 27 CT-1

## (undated) DEVICE — SOL NACL 0.9% INJ 250ML BG

## (undated) DEVICE — GOWN SURG 2XL DISP TIE BACK

## (undated) DEVICE — TIP SUCTION YANKAUER

## (undated) DEVICE — Device

## (undated) DEVICE — TAPE SILK 3IN

## (undated) DEVICE — SOL 9P NACL IRR PIC IL

## (undated) DEVICE — WIRE 2.8 X 300 MM THREADED
Type: IMPLANTABLE DEVICE | Site: HIP | Status: NON-FUNCTIONAL
Removed: 2017-03-29

## (undated) DEVICE — NDL HYPODERMIC BLUNT 18G 1.5IN

## (undated) DEVICE — SYS CLSR DERMABOND PRINEO 22CM

## (undated) DEVICE — NDL SPINAL 18GX3.5 SPINOCAN

## (undated) DEVICE — SEALER AQUAMANTYS 3.48MM

## (undated) DEVICE — GLOVE LINER CUT RESISTANT LG

## (undated) DEVICE — POSITIONER HEAD DONUT 9IN FOAM

## (undated) DEVICE — SYR ONLY LUER LOCK 20CC

## (undated) DEVICE — DRAPE MOBILE C-ARM

## (undated) DEVICE — APPLICATOR CHLORAPREP ORN 26ML

## (undated) DEVICE — DUODERM EXTRA THIN 4X4

## (undated) DEVICE — COVER OVERHEAD SURG LT BLUE

## (undated) DEVICE — TAPE SURGICAL MICROFOAM 3IN

## (undated) DEVICE — MANIFOLD 4 PORT

## (undated) DEVICE — GLOVE SURGICAL LATEX SZ 8

## (undated) DEVICE — DRAPE PLASTIC U 60X72

## (undated) DEVICE — SEE MEDLINE ITEM 152622

## (undated) DEVICE — SKIN MARKER DEVON 160

## (undated) DEVICE — KIT IRR SUCTION HND PIECE